# Patient Record
Sex: FEMALE | Race: BLACK OR AFRICAN AMERICAN | Employment: FULL TIME | URBAN - METROPOLITAN AREA
[De-identification: names, ages, dates, MRNs, and addresses within clinical notes are randomized per-mention and may not be internally consistent; named-entity substitution may affect disease eponyms.]

---

## 2017-01-18 ENCOUNTER — HOSPITAL ENCOUNTER (OUTPATIENT)
Dept: LAB | Age: 27
Discharge: HOME | End: 2017-01-18
Attending: OBSTETRICS & GYNECOLOGY
Payer: COMMERCIAL

## 2017-01-18 LAB — HCG SERPL-ACNC: 0 MIU/ML (ref 0–6)

## 2017-01-18 PROCEDURE — 84702 CHORIONIC GONADOTROPIN TEST: CPT | Performed by: OBSTETRICS & GYNECOLOGY

## 2017-03-14 ENCOUNTER — HOSPITAL ENCOUNTER (EMERGENCY)
Age: 27
Discharge: HOME | End: 2017-03-14
Attending: EMERGENCY MEDICINE
Payer: COMMERCIAL

## 2017-03-14 VITALS
DIASTOLIC BLOOD PRESSURE: 70 MMHG | WEIGHT: 216 LBS | RESPIRATION RATE: 16 BRPM | BODY MASS INDEX: 36.88 KG/M2 | HEART RATE: 88 BPM | HEIGHT: 64 IN | SYSTOLIC BLOOD PRESSURE: 125 MMHG | OXYGEN SATURATION: 98 % | TEMPERATURE: 98.2 F

## 2017-03-14 DIAGNOSIS — J02.9 SORE THROAT: Primary | ICD-10-CM

## 2017-03-14 LAB — DEPRECATED S PYO AG THROAT QL EIA: NEGATIVE

## 2017-03-14 PROCEDURE — 87880 STREP A ASSAY W/OPTIC: CPT | Performed by: EMERGENCY MEDICINE

## 2017-03-14 PROCEDURE — 99282 EMERGENCY DEPT VISIT SF MDM: CPT | Performed by: PHYSICIAN ASSISTANT

## 2017-03-14 PROCEDURE — 87081 CULTURE SCREEN ONLY: CPT | Performed by: EMERGENCY MEDICINE

## 2017-03-14 NOTE — LETTER
[facility] EMERGENCY DEPT 
[address] [place] [id number] 
[phone] Work/School Note Date: 3/14/2017 To Whom It May concern: 
 
Terrell Casarez was seen and treated today in the emergency room by the following provider(s): 
Attending Provider: Patti Adkins MD 
Physician Assistant: HOLLIS Castro. Terrell Casarez may return to work on 3-15-17. Sincerely, HOLLIS Castro

## 2017-03-14 NOTE — DISCHARGE INSTRUCTIONS
Sore Throat: Care Instructions  Your Care Instructions    Infection by bacteria or a virus causes most sore throats. Cigarette smoke, dry air, air pollution, allergies, and yelling can also cause a sore throat. Sore throats can be painful and annoying. Fortunately, most sore throats go away on their own. If you have a bacterial infection, your doctor may prescribe antibiotics. Follow-up care is a key part of your treatment and safety. Be sure to make and go to all appointments, and call your doctor if you are having problems. It's also a good idea to know your test results and keep a list of the medicines you take. How can you care for yourself at home? · If your doctor prescribed antibiotics, take them as directed. Do not stop taking them just because you feel better. You need to take the full course of antibiotics. · Gargle with warm salt water once an hour to help reduce swelling and relieve discomfort. Use 1 teaspoon of salt mixed in 1 cup of warm water. · Take an over-the-counter pain medicine, such as acetaminophen (Tylenol), ibuprofen (Advil, Motrin), or naproxen (Aleve). Read and follow all instructions on the label. · Be careful when taking over-the-counter cold or flu medicines and Tylenol at the same time. Many of these medicines have acetaminophen, which is Tylenol. Read the labels to make sure that you are not taking more than the recommended dose. Too much acetaminophen (Tylenol) can be harmful. · Drink plenty of fluids. Fluids may help soothe an irritated throat. Hot fluids, such as tea or soup, may help decrease throat pain. · Use over-the-counter throat lozenges to soothe pain. Regular cough drops or hard candy may also help. These should not be given to young children because of the risk of choking. · Do not smoke or allow others to smoke around you. If you need help quitting, talk to your doctor about stop-smoking programs and medicines.  These can increase your chances of quitting for good. · Use a vaporizer or humidifier to add moisture to your bedroom. Follow the directions for cleaning the machine. When should you call for help? Call your doctor now or seek immediate medical care if:  · You have new or worse trouble swallowing. · Your sore throat gets much worse on one side. Watch closely for changes in your health, and be sure to contact your doctor if you do not get better as expected. Where can you learn more? Go to http://wilfredo-gretchen.info/. Enter [phone] [id number] [phone] in the search box to learn more about \"Sore Throat: Care Instructions. \"  Current as of: July 29, 2016  Content Version: 11.1  © 2368-9886 [employer]. Care instructions adapted under license by [employer] (which disclaims liability or warranty for this information). If you have questions about a medical condition or this instruction, always ask your healthcare professional. [address] any warranty or liability for your use of this information.

## 2017-03-14 NOTE — ED PROVIDER NOTES
Patient is a 32 y.o. female presenting with sore throat and cough. The history is provided by the patient. Sore Throat    This is a new problem. The current episode started yesterday. The problem has not changed since onset. There has been no fever. Associated symptoms include cough. She has had no exposure to strep or mono. She has tried nothing for the symptoms. The treatment provided no relief. Cough   Associated symptoms include sore throat. Past Medical History:   Diagnosis Date    Abnormal Papanicolaou smear of cervix     12/2016 (postpartum) ASCUS, neg HPV     Headache     Hypertension     with last pregnancy    Preeclampsia        History reviewed. No pertinent surgical history. Family History:   Problem Relation Age of Onset    Hypertension Mother     Migraines Mother     Lung Disease Maternal Grandmother     Alcohol abuse Neg Hx     Arthritis-rheumatoid Neg Hx     Bleeding Prob Neg Hx     Cancer Neg Hx     Headache Neg Hx     Breast Cancer Neg Hx     Colon Cancer Neg Hx     Ovarian Cancer Neg Hx        Social History     Social History    Marital status: SINGLE     Spouse name: N/A    Number of children: N/A    Years of education: N/A     Occupational History    Not on file. Social History Main Topics    Smoking status: Never Smoker    Smokeless tobacco: Not on file    Alcohol use No    Drug use: No    Sexual activity: Not Currently     Partners: Male     Birth control/ protection: IUD     Other Topics Concern    Not on file     Social History Narrative         ALLERGIES: Review of patient's allergies indicates no known allergies. Review of Systems   HENT: Positive for sore throat. Respiratory: Positive for cough. All other systems reviewed and are negative.       Vitals:    03/14/17 0849   BP: 125/70   Pulse: 88   Resp: 16   Temp: 98.2 °F (36.8 °C)   SpO2: 98%   Weight: 98 kg (216 lb)   Height: 5' 4\" (1.626 m)            Physical Exam   Constitutional: She is oriented to person, place, and time. She appears well-developed and well-nourished. No distress. HENT:   Head: Normocephalic and atraumatic. Right Ear: External ear normal.   Left Ear: External ear normal.   Mouth/Throat: Oropharynx is clear and moist.   Eyes: Conjunctivae and EOM are normal. Pupils are equal, round, and reactive to light. Neck: Normal range of motion. Neck supple. Cardiovascular: Normal rate and regular rhythm. Pulmonary/Chest: Effort normal and breath sounds normal. No respiratory distress. She has no wheezes. Abdominal: Soft. Bowel sounds are normal. There is no tenderness. There is no rebound and no guarding. Musculoskeletal: She exhibits no edema. Neurological: She is alert and oriented to person, place, and time. Skin: Skin is warm. Nursing note and vitals reviewed.        MDM  Number of Diagnoses or Management Options  Diagnosis management comments: rss -,  Advised to use over the counter cough and cold meds for symptomatic  relief, work note given        Amount and/or Complexity of Data Reviewed  Clinical lab tests: ordered and reviewed  Review and summarize past medical records: yes    Risk of Complications, Morbidity, and/or Mortality  Presenting problems: low  Diagnostic procedures: low  Management options: low    Patient Progress  Patient progress: improved    ED Course       Procedures

## 2017-03-16 LAB
BACTERIA SPEC CULT: NORMAL
SERVICE CMNT-IMP: NORMAL

## 2018-01-09 ENCOUNTER — HOSPITAL ENCOUNTER (EMERGENCY)
Age: 28
Discharge: HOME | End: 2018-01-09
Payer: COMMERCIAL

## 2018-01-09 VITALS
OXYGEN SATURATION: 99 % | TEMPERATURE: 98 F | DIASTOLIC BLOOD PRESSURE: 88 MMHG | BODY MASS INDEX: 38.8 KG/M2 | RESPIRATION RATE: 16 BRPM | SYSTOLIC BLOOD PRESSURE: 129 MMHG | WEIGHT: 219 LBS | HEART RATE: 80 BPM | HEIGHT: 63 IN

## 2018-01-09 DIAGNOSIS — R11.2 NON-INTRACTABLE VOMITING WITH NAUSEA, UNSPECIFIED VOMITING TYPE: Primary | ICD-10-CM

## 2018-01-09 LAB
ALBUMIN SERPL-MCNC: 3.7 G/DL (ref 3.5–5)
ALBUMIN/GLOB SERPL: 0.9 {RATIO} (ref 1.2–3.5)
ALP SERPL-CCNC: 74 U/L (ref 50–136)
ALT SERPL-CCNC: 36 U/L (ref 12–65)
ANION GAP SERPL CALC-SCNC: 10 MMOL/L (ref 7–16)
AST SERPL-CCNC: 23 U/L (ref 15–37)
BACTERIA URNS QL MICRO: 0 /HPF
BASOPHILS # BLD: 0 K/UL (ref 0–0.2)
BASOPHILS NFR BLD: 0 % (ref 0–2)
BILIRUB SERPL-MCNC: 0.4 MG/DL (ref 0.2–1.1)
BUN SERPL-MCNC: 8 MG/DL (ref 6–23)
CALCIUM SERPL-MCNC: 9 MG/DL (ref 8.3–10.4)
CASTS URNS QL MICRO: NORMAL /LPF
CHLORIDE SERPL-SCNC: 103 MMOL/L (ref 98–107)
CO2 SERPL-SCNC: 26 MMOL/L (ref 21–32)
CREAT SERPL-MCNC: 0.61 MG/DL (ref 0.6–1)
DIFFERENTIAL METHOD BLD: ABNORMAL
EOSINOPHIL # BLD: 0.2 K/UL (ref 0–0.8)
EOSINOPHIL NFR BLD: 2 % (ref 0.5–7.8)
EPI CELLS #/AREA URNS HPF: NORMAL /HPF
ERYTHROCYTE [DISTWIDTH] IN BLOOD BY AUTOMATED COUNT: 13.1 % (ref 11.9–14.6)
GLOBULIN SER CALC-MCNC: 4 G/DL (ref 2.3–3.5)
GLUCOSE SERPL-MCNC: 91 MG/DL (ref 65–100)
HCT VFR BLD AUTO: 40 % (ref 35.8–46.3)
HGB BLD-MCNC: 13.5 G/DL (ref 11.7–15.4)
IMM GRANULOCYTES # BLD: 0 K/UL (ref 0–0.5)
IMM GRANULOCYTES NFR BLD AUTO: 0 % (ref 0–5)
LIPASE SERPL-CCNC: 95 U/L (ref 73–393)
LYMPHOCYTES # BLD: 1.6 K/UL (ref 0.5–4.6)
LYMPHOCYTES NFR BLD: 25 % (ref 13–44)
MCH RBC QN AUTO: 30.6 PG (ref 26.1–32.9)
MCHC RBC AUTO-ENTMCNC: 33.8 G/DL (ref 31.4–35)
MCV RBC AUTO: 90.7 FL (ref 79.6–97.8)
MONOCYTES # BLD: 0.5 K/UL (ref 0.1–1.3)
MONOCYTES NFR BLD: 9 % (ref 4–12)
NEUTS SEG # BLD: 4 K/UL (ref 1.7–8.2)
NEUTS SEG NFR BLD: 64 % (ref 43–78)
PLATELET # BLD AUTO: 330 K/UL (ref 150–450)
PMV BLD AUTO: 10.5 FL (ref 10.8–14.1)
POTASSIUM SERPL-SCNC: 4 MMOL/L (ref 3.5–5.1)
PROT SERPL-MCNC: 7.7 G/DL (ref 6.3–8.2)
RBC # BLD AUTO: 4.41 M/UL (ref 4.05–5.25)
RBC #/AREA URNS HPF: NORMAL /HPF
SODIUM SERPL-SCNC: 139 MMOL/L (ref 136–145)
WBC # BLD AUTO: 6.3 K/UL (ref 4.3–11.1)
WBC URNS QL MICRO: NORMAL /HPF

## 2018-01-09 PROCEDURE — 85025 COMPLETE CBC W/AUTO DIFF WBC: CPT

## 2018-01-09 PROCEDURE — 74011250636 HC RX REV CODE- 250/636

## 2018-01-09 PROCEDURE — 96361 HYDRATE IV INFUSION ADD-ON: CPT

## 2018-01-09 PROCEDURE — 83690 ASSAY OF LIPASE: CPT

## 2018-01-09 PROCEDURE — 80053 COMPREHEN METABOLIC PANEL: CPT

## 2018-01-09 PROCEDURE — 81003 URINALYSIS AUTO W/O SCOPE: CPT

## 2018-01-09 PROCEDURE — 81015 MICROSCOPIC EXAM OF URINE: CPT | Performed by: EMERGENCY MEDICINE

## 2018-01-09 PROCEDURE — 96374 THER/PROPH/DIAG INJ IV PUSH: CPT

## 2018-01-09 PROCEDURE — 99284 EMERGENCY DEPT VISIT MOD MDM: CPT

## 2018-01-09 PROCEDURE — 74011250637 HC RX REV CODE- 250/637

## 2018-01-09 RX ORDER — HYOSCYAMINE SULFATE 0.12 MG/1
0.12 TABLET SUBLINGUAL
Status: COMPLETED | OUTPATIENT
Start: 2018-01-09 | End: 2018-01-09

## 2018-01-09 RX ORDER — ONDANSETRON 2 MG/ML
4 INJECTION INTRAMUSCULAR; INTRAVENOUS
Status: COMPLETED | OUTPATIENT
Start: 2018-01-09 | End: 2018-01-09

## 2018-01-09 RX ORDER — ONDANSETRON HYDROCHLORIDE 8 MG/1
8 TABLET, FILM COATED ORAL
Qty: 10 TAB | Refills: 0 | Status: SHIPPED | OUTPATIENT
Start: 2018-01-09 | End: 2019-01-22

## 2018-01-09 RX ADMIN — ONDANSETRON 4 MG: 2 INJECTION INTRAMUSCULAR; INTRAVENOUS at 07:26

## 2018-01-09 RX ADMIN — SODIUM CHLORIDE 1000 ML: 900 INJECTION, SOLUTION INTRAVENOUS at 07:25

## 2018-01-09 RX ADMIN — HYOSCYAMINE SULFATE 0.12 MG: 0.12 TABLET ORAL; SUBLINGUAL at 07:26

## 2018-01-09 NOTE — ED NOTES
I have reviewed discharge instructions with the patient. The patient verbalized understanding. Patient left ED via Discharge Method: ambulatory to Home with self. Opportunity for questions and clarification provided. Patient given 1 scripts. Patient given work excuse. To continue your aftercare when you leave the hospital, you may receive an automated call from our care team to check in on how you are doing. This is a free service and part of our promise to provide the best care and service to meet your aftercare needs.  If you have questions, or wish to unsubscribe from this service please call [phone]. Thank you for Choosing our [address] [place] [address] Emergency Department.

## 2018-01-09 NOTE — LETTER
[facility] EMERGENCY DEPT 
[address] [place] [id number] 
[phone] Work/School Note Date: 1/9/2018 To Whom It May concern: 
 
Michael Simmons was seen and treated today in the emergency room by the following provider(s): 
Attending Provider: Marilee Cranker, MD. Michael Simmons may return to work on 1/10/18.  
 
Sincerely, 
 
 
 
 
Judith Galaviz RN

## 2018-01-09 NOTE — ED PROVIDER NOTES
Patient is a 32 y.o. female presenting with vomiting. The history is provided by the patient. Vomiting    This is a new problem. The current episode started 6 to 12 hours ago. The problem occurs 2 to 4 times per day. The problem has not changed since onset. The emesis has an appearance of stomach contents. There has been no fever. Pertinent negatives include no chills, no fever, no sweats, no abdominal pain, no diarrhea, no arthralgias and no myalgias. The patient is not pregnant. Risk factors include ill contacts. Her pertinent negatives include no irritable bowel syndrome, no bowel resection, no gastric bypass and no DM. Past Medical History:   Diagnosis Date    Abnormal Papanicolaou smear of cervix     12/2016 (postpartum) ASCUS, neg HPV     Headache     Hypertension     with last pregnancy    Preeclampsia        History reviewed. No pertinent surgical history. Family History:   Problem Relation Age of Onset    Hypertension Mother     Migraines Mother     Lung Disease Maternal Grandmother     Alcohol abuse Neg Hx     Arthritis-rheumatoid Neg Hx     Bleeding Prob Neg Hx     Cancer Neg Hx     Headache Neg Hx     Breast Cancer Neg Hx     Colon Cancer Neg Hx     Ovarian Cancer Neg Hx        Social History     Social History    Marital status: SINGLE     Spouse name: N/A    Number of children: N/A    Years of education: N/A     Occupational History    Not on file. Social History Main Topics    Smoking status: Never Smoker    Smokeless tobacco: Not on file    Alcohol use No    Drug use: No    Sexual activity: Not Currently     Partners: Male     Birth control/ protection: IUD     Other Topics Concern    Not on file     Social History Narrative         ALLERGIES: Review of patient's allergies indicates no known allergies. Review of Systems   Constitutional: Negative. Negative for activity change, chills and fever. HENT: Negative. Eyes: Negative. Respiratory: Negative. Cardiovascular: Negative. Gastrointestinal: Positive for vomiting. Negative for abdominal pain and diarrhea. Genitourinary: Negative. Musculoskeletal: Negative. Negative for arthralgias and myalgias. Skin: Negative. Neurological: Negative. Psychiatric/Behavioral: Negative. All other systems reviewed and are negative. Vitals:    01/09/18 0615   BP: 129/88   Pulse: 80   Resp: 16   Temp: 98 °F (36.7 °C)   SpO2: 99%   Weight: 99.3 kg (219 lb)   Height: 5' 3\" (1.6 m)            Physical Exam   Constitutional: She is oriented to person, place, and time. She appears well-developed and well-nourished. No distress. HENT:   Head: Normocephalic and atraumatic. Right Ear: External ear normal.   Left Ear: External ear normal.   Nose: Nose normal.   Mouth/Throat: Oropharynx is clear and moist. No oropharyngeal exudate. Eyes: Conjunctivae and EOM are normal. Pupils are equal, round, and reactive to light. Right eye exhibits no discharge. Left eye exhibits no discharge. No scleral icterus. Neck: Normal range of motion. Neck supple. No JVD present. No tracheal deviation present. Cardiovascular: Normal rate, regular rhythm and intact distal pulses. Pulmonary/Chest: Effort normal and breath sounds normal. No stridor. No respiratory distress. She has no wheezes. She exhibits no tenderness. Abdominal: Soft. Bowel sounds are normal. She exhibits no distension and no mass. There is no tenderness. Musculoskeletal: Normal range of motion. She exhibits no edema or tenderness. Neurological: She is alert and oriented to person, place, and time. No cranial nerve deficit. Skin: Skin is warm and dry. No rash noted. She is not diaphoretic. No erythema. No pallor. Psychiatric: She has a normal mood and affect. Her behavior is normal. Thought content normal.   Nursing note and vitals reviewed.        MDM  Number of Diagnoses or Management Options  Diagnosis management comments: No abdominal pain and no tenderness. Symptoms started last night. She is not developed diarrhea. She has been around several children who've been sick.        Amount and/or Complexity of Data Reviewed  Clinical lab tests: ordered and reviewed  Tests in the radiology section of CPT®: ordered and reviewed  Tests in the medicine section of CPT®: ordered and reviewed      ED Course       Procedures

## 2018-01-09 NOTE — DISCHARGE INSTRUCTIONS
Nausea and Vomiting: Care Instructions  Your Care Instructions    When you are nauseated, you may feel weak and sweaty and notice a lot of saliva in your mouth. Nausea often leads to vomiting. Most of the time you do not need to worry about nausea and vomiting, but they can be signs of other illnesses. Two common causes of nausea and vomiting are stomach flu and food poisoning. Nausea and vomiting from viral stomach flu will usually start to improve within 24 hours. Nausea and vomiting from food poisoning may last from 12 to 48 hours. The doctor has checked you carefully, but problems can develop later. If you notice any problems or new symptoms, get medical treatment right away. Follow-up care is a key part of your treatment and safety. Be sure to make and go to all appointments, and call your doctor if you are having problems. It's also a good idea to know your test results and keep a list of the medicines you take. How can you care for yourself at home? · To prevent dehydration, drink plenty of fluids, enough so that your urine is light yellow or clear like water. Choose water and other caffeine-free clear liquids until you feel better. If you have kidney, heart, or liver disease and have to limit fluids, talk with your doctor before you increase the amount of fluids you drink. · Rest in bed until you feel better. · When you are able to eat, try clear soups, mild foods, and liquids until all symptoms are gone for 12 to 48 hours. Other good choices include dry toast, crackers, cooked cereal, and gelatin dessert, such as Jell-O. When should you call for help? Call 911 anytime you think you may need emergency care. For example, call if:  ? · You passed out (lost consciousness). ?Call your doctor now or seek immediate medical care if:  ? · You have symptoms of dehydration, such as:  ¨ Dry eyes and a dry mouth. ¨ Passing only a little dark urine.   ¨ Feeling thirstier than usual.   ? · You have new or worsening belly pain. ? · You have a new or higher fever. ? · You vomit blood or what looks like coffee grounds. ? Watch closely for changes in your health, and be sure to contact your doctor if:  ? · You have ongoing nausea and vomiting. ? · Your vomiting is getting worse. ? · Your vomiting lasts longer than 2 days. ? · You are not getting better as expected. Where can you learn more? Go to http://wilfredo-gretchen.info/. Enter 25 [id number] in the search box to learn more about \"Nausea and Vomiting: Care Instructions. \"  Current as of: March 20, 2017  Content Version: 11.4  © 6206-4913 [employer]. Care instructions adapted under license by [employer] (which disclaims liability or warranty for this information). If you have questions about a medical condition or this instruction, always ask your healthcare professional. [address] any warranty or liability for your use of this information.

## 2019-01-22 ENCOUNTER — HOSPITAL ENCOUNTER (EMERGENCY)
Age: 29
Discharge: HOME | End: 2019-01-23
Attending: EMERGENCY MEDICINE
Payer: SELF-PAY

## 2019-01-22 DIAGNOSIS — R42 DIZZINESS: ICD-10-CM

## 2019-01-22 DIAGNOSIS — R55 NEAR SYNCOPE: Primary | ICD-10-CM

## 2019-01-22 LAB
ALBUMIN SERPL-MCNC: 4 G/DL (ref 3.5–5)
ALBUMIN/GLOB SERPL: 0.9 {RATIO}
ALP SERPL-CCNC: 78 U/L (ref 50–136)
ALT SERPL-CCNC: 35 U/L (ref 12–65)
ANION GAP SERPL CALC-SCNC: 10 MMOL/L
AST SERPL-CCNC: 23 U/L (ref 15–37)
ATRIAL RATE: 76 BPM
BASOPHILS # BLD: 0 K/UL (ref 0–0.2)
BASOPHILS NFR BLD: 0 % (ref 0–2)
BILIRUB SERPL-MCNC: 0.8 MG/DL (ref 0.2–1.1)
BUN SERPL-MCNC: 5 MG/DL (ref 6–23)
CALCIUM SERPL-MCNC: 9.2 MG/DL (ref 8.3–10.4)
CALCULATED P AXIS, ECG09: 70 DEGREES
CALCULATED R AXIS, ECG10: 52 DEGREES
CALCULATED T AXIS, ECG11: 40 DEGREES
CHLORIDE SERPL-SCNC: 102 MMOL/L (ref 98–107)
CO2 SERPL-SCNC: 25 MMOL/L (ref 21–32)
CREAT SERPL-MCNC: 0.88 MG/DL (ref 0.6–1)
DIAGNOSIS, 93000: NORMAL
DIFFERENTIAL METHOD BLD: NORMAL
EOSINOPHIL # BLD: 0.1 K/UL (ref 0–0.8)
EOSINOPHIL NFR BLD: 1 % (ref 0.5–7.8)
ERYTHROCYTE [DISTWIDTH] IN BLOOD BY AUTOMATED COUNT: 13.2 % (ref 11.9–14.6)
GLOBULIN SER CALC-MCNC: 4.4 G/DL
GLUCOSE SERPL-MCNC: 89 MG/DL (ref 65–100)
HCT VFR BLD AUTO: 40.8 % (ref 35.8–46.3)
HGB BLD-MCNC: 13.6 G/DL (ref 11.7–15.4)
IMM GRANULOCYTES # BLD AUTO: 0 K/UL (ref 0–0.5)
IMM GRANULOCYTES NFR BLD AUTO: 0 % (ref 0–5)
LYMPHOCYTES # BLD: 2.9 K/UL (ref 0.5–4.6)
LYMPHOCYTES NFR BLD: 31 % (ref 13–44)
MCH RBC QN AUTO: 31 PG (ref 26.1–32.9)
MCHC RBC AUTO-ENTMCNC: 33.3 G/DL (ref 31.4–35)
MCV RBC AUTO: 92.9 FL (ref 79.6–97.8)
MONOCYTES # BLD: 0.5 K/UL (ref 0.1–1.3)
MONOCYTES NFR BLD: 6 % (ref 4–12)
NEUTS SEG # BLD: 5.8 K/UL (ref 1.7–8.2)
NEUTS SEG NFR BLD: 62 % (ref 43–78)
NRBC # BLD: 0 K/UL (ref 0–0.2)
P-R INTERVAL, ECG05: 166 MS
PLATELET # BLD AUTO: 376 K/UL (ref 150–450)
PMV BLD AUTO: 9.9 FL (ref 9.4–12.3)
POTASSIUM SERPL-SCNC: 3.2 MMOL/L (ref 3.5–5.1)
PROT SERPL-MCNC: 8.4 G/DL
Q-T INTERVAL, ECG07: 390 MS
QRS DURATION, ECG06: 82 MS
QTC CALCULATION (BEZET), ECG08: 438 MS
RBC # BLD AUTO: 4.39 M/UL (ref 4.05–5.2)
SODIUM SERPL-SCNC: 137 MMOL/L (ref 136–145)
VENTRICULAR RATE, ECG03: 76 BPM
WBC # BLD AUTO: 9.4 K/UL (ref 4.3–11.1)

## 2019-01-22 PROCEDURE — 93005 ELECTROCARDIOGRAM TRACING: CPT | Performed by: STUDENT IN AN ORGANIZED HEALTH CARE EDUCATION/TRAINING PROGRAM

## 2019-01-22 PROCEDURE — 80053 COMPREHEN METABOLIC PANEL: CPT

## 2019-01-22 PROCEDURE — 85025 COMPLETE CBC W/AUTO DIFF WBC: CPT

## 2019-01-22 PROCEDURE — 99284 EMERGENCY DEPT VISIT MOD MDM: CPT | Performed by: STUDENT IN AN ORGANIZED HEALTH CARE EDUCATION/TRAINING PROGRAM

## 2019-01-23 VITALS
WEIGHT: 232 LBS | RESPIRATION RATE: 16 BRPM | SYSTOLIC BLOOD PRESSURE: 136 MMHG | BODY MASS INDEX: 39.61 KG/M2 | HEIGHT: 64 IN | OXYGEN SATURATION: 99 % | DIASTOLIC BLOOD PRESSURE: 77 MMHG | TEMPERATURE: 98.2 F | HEART RATE: 81 BPM

## 2019-01-23 NOTE — DISCHARGE INSTRUCTIONS
Patient Education        Dizziness: Care Instructions  Your Care Instructions  Dizziness is the feeling of unsteadiness or fuzziness in your head. It is different than having vertigo, which is a feeling that the room is spinning or that you are moving or falling. It is also different from lightheadedness, which is the feeling that you are about to faint. It can be hard to know what causes dizziness. Some people feel dizzy when they have migraine headaches. Sometimes bouts of flu can make you feel dizzy. Some medical conditions, such as heart problems or high blood pressure, can make you feel dizzy. Many medicines can cause dizziness, including medicines for high blood pressure, pain, or anxiety. If a medicine causes your symptoms, your doctor may recommend that you stop or change the medicine. If it is a problem with your heart, you may need medicine to help your heart work better. If there is no clear reason for your symptoms, your doctor may suggest watching and waiting for a while to see if the dizziness goes away on its own. Follow-up care is a key part of your treatment and safety. Be sure to make and go to all appointments, and call your doctor if you are having problems. It's also a good idea to know your test results and keep a list of the medicines you take. How can you care for yourself at home? · If your doctor recommends or prescribes medicine, take it exactly as directed. Call your doctor if you think you are having a problem with your medicine. · Do not drive while you feel dizzy. · Try to prevent falls. Steps you can take include:  ? Using nonskid mats, adding grab bars near the tub, and using night-lights. ? Clearing your home so that walkways are free of anything you might trip on.  ? Letting family and friends know that you have been feeling dizzy. This will help them know how to help you. When should you call for help? Call 911 anytime you think you may need emergency care.  For example, call if:    · You passed out (lost consciousness).     · You have dizziness along with symptoms of a heart attack. These may include:  ? Chest pain or pressure, or a strange feeling in the chest.  ? Sweating. ? Shortness of breath. ? Nausea or vomiting. ? Pain, pressure, or a strange feeling in the back, neck, jaw, or upper belly or in one or both shoulders or arms. ? Lightheadedness or sudden weakness. ? A fast or irregular heartbeat.     · You have symptoms of a stroke. These may include:  ? Sudden numbness, tingling, weakness, or loss of movement in your face, arm, or leg, especially on only one side of your body. ? Sudden vision changes. ? Sudden trouble speaking. ? Sudden confusion or trouble understanding simple statements. ? Sudden problems with walking or balance. ? A sudden, severe headache that is different from past headaches.    Call your doctor now or seek immediate medical care if:    · You feel dizzy and have a fever, headache, or ringing in your ears.     · You have new or increased nausea and vomiting.     · Your dizziness does not go away or comes back.    Watch closely for changes in your health, and be sure to contact your doctor if:    · You do not get better as expected. Where can you learn more? Go to http://wilfredo-gretchen.info/. Enter H985 in the search box to learn more about \"Dizziness: Care Instructions. \"  Current as of: September 23, 2018  Content Version: 11.9  © 6166-8765 [employer]. Care instructions adapted under license by [employer] (which disclaims liability or warranty for this information). If you have questions about a medical condition or this instruction, always ask your healthcare professional. [place] [address] any warranty or liability for your use of this information.          Patient Education        Lightheadedness or Faintness: Care Instructions  Your Care Instructions  Lightheadedness is a feeling that you are about to faint or \"pass out. \" You do not feel as if you or your surroundings are moving. It is different from vertigo, which is the feeling that you or things around you are spinning or tilting. Lightheadedness usually goes away or gets better when you lie down. If lightheadedness gets worse, it can lead to a fainting spell. It is common to feel lightheaded from time to time. Lightheadedness usually is not caused by a serious problem. It often is caused by a short-lasting drop in blood pressure and blood flow to your head that occurs when you get up too quickly from a seated or lying position. Follow-up care is a key part of your treatment and safety. Be sure to make and go to all appointments, and call your doctor if you are having problems. It's also a good idea to know your test results and keep a list of the medicines you take. How can you care for yourself at home? · Lie down for 1 or 2 minutes when you feel lightheaded. After lying down, sit up slowly and remain sitting for 1 to 2 minutes before slowly standing up. · Avoid movements, positions, or activities that have made you lightheaded in the past.  · Get plenty of rest, especially if you have a cold or flu, which can cause lightheadedness. · Make sure you drink plenty of fluids, especially if you have a fever or have been sweating. · Do not drive or put yourself and others in danger while you feel lightheaded. When should you call for help? Call 911 anytime you think you may need emergency care. For example, call if:    · You have symptoms of a stroke. These may include:  ? Sudden numbness, tingling, weakness, or loss of movement in your face, arm, or leg, especially on only one side of your body. ? Sudden vision changes. ? Sudden trouble speaking. ? Sudden confusion or trouble understanding simple statements. ? Sudden problems with walking or balance.   ? A sudden, severe headache that is different from past headaches.     · You have symptoms of a heart attack. These may include:  ? Chest pain or pressure, or a strange feeling in the chest.  ? Sweating. ? Shortness of breath. ? Nausea or vomiting. ? Pain, pressure, or a strange feeling in the back, neck, jaw, or upper belly or in one or both shoulders or arms. ? Lightheadedness or sudden weakness. ? A fast or irregular heartbeat. After you call 911, the  may tell you to chew 1 adult-strength or 2 to 4 low-dose aspirin. Wait for an ambulance. Do not try to drive yourself.    Watch closely for changes in your health, and be sure to contact your doctor if:    · Your lightheadedness gets worse or does not get better with home care. Where can you learn more? Go to http://wilfredo-gretchen.info/. Enter L265 in the search box to learn more about \"Lightheadedness or Faintness: Care Instructions. \"  Current as of: September 23, 2018  Content Version: 11.9  © 4360-3157 [employer]. Care instructions adapted under license by [employer] (which disclaims liability or warranty for this information). If you have questions about a medical condition or this instruction, always ask your healthcare professional. [address] any warranty or liability for your use of this information.

## 2019-01-23 NOTE — ED NOTES
I have reviewed discharge instructions with the patient. The patient verbalized understanding. Patient left ED via Discharge Method: ambulatory to Home and Home with Home Health with self. Opportunity for questions and clarification provided. Patient given 0 scripts. To continue your aftercare when you leave the hospital, you may receive an automated call from our care team to check in on how you are doing. This is a free service and part of our promise to provide the best care and service to meet your aftercare needs.  If you have questions, or wish to unsubscribe from this service please call [phone]. Thank you for Choosing our [facility] Emergency Department.

## 2019-01-23 NOTE — ED PROVIDER NOTES
Earlier this day. She states that she felt somewhat \"faint and lightheaded. \" now feels fine other soreness to her right neck and anterior chest that is worse to palpation and movement. Recalls no injury. 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 Does not recall any injury or lifting that might have provoked to an injury. The history is provided by the patient. Dizziness This is a new problem. The problem has been resolved. There was no focality noted. Pertinent negatives include no focal weakness, no loss of sensation, no loss of balance, no slurred speech, no speech difficulty, no agitation, no visual change, no auditory change and no unresponsiveness. There has been no fever. Pertinent negatives include no altered mental status, no confusion, no bowel incontinence and no bladder incontinence. Past Medical History:  
Diagnosis Date  Abnormal Papanicolaou smear of cervix 12/2016 (postpartum) ASCUS, neg HPV  Headache  Hypertension   
 with last pregnancy  Preeclampsia History reviewed. No pertinent surgical history. Family History:  
Problem Relation Age of Onset  Hypertension Mother  Migraines Mother  Lung Disease Maternal Grandmother  Alcohol abuse Neg Hx  Arthritis-rheumatoid Neg Hx  Bleeding Prob Neg Hx  Cancer Neg Hx   
 Headache Neg Hx  Breast Cancer Neg Hx  Colon Cancer Neg Hx   
 Ovarian Cancer Neg Hx Social History Socioeconomic History  Marital status: SINGLE Spouse name: Not on file  Number of children: Not on file  Years of education: Not on file  Highest education level: Not on file Social Needs  Financial resource strain: Not on file  Food insecurity - worry: Not on file  Food insecurity - inability: Not on file  Transportation needs - medical: Not on file  Transportation needs - non-medical: Not on file Occupational History  Not on file Tobacco Use  Smoking status: Never Smoker Substance and Sexual Activity  Alcohol use: No  
 Drug use: No  
 Sexual activity: Not Currently Partners: Male Birth control/protection: IUD Other Topics Concern  Not on file Social History Narrative  Not on file ALLERGIES: Patient has no known allergies. Review of Systems Constitutional: Negative for chills and fever. HENT: Negative for ear discharge, ear pain, facial swelling, hearing loss, tinnitus and trouble swallowing. Respiratory: Negative. Cardiovascular: Negative. Gastrointestinal: Negative. Negative for bowel incontinence. Genitourinary: Negative. Negative for bladder incontinence. Musculoskeletal: Negative. Sore to palpation to right trapezius and SCM region Skin: Negative. Negative for color change and pallor. Neurological: Positive for dizziness. Negative for focal weakness, speech difficulty and loss of balance. Psychiatric/Behavioral: Negative for agitation and confusion. All other systems reviewed and are negative. Vitals:  
 01/22/19 2012 01/22/19 2024 01/22/19 2257 01/22/19 2258 BP: (!) 148/91 149/78 125/70 Pulse: 74 Resp: 18 Temp: 98.3 °F (36.8 °C) SpO2: 100% 99%  99% Weight: 105.2 kg (232 lb) Height: 5' 4\" (1.626 m) Physical Exam  
Constitutional: She appears well-developed and well-nourished. No distress. HENT:  
Head: Atraumatic. Right Ear: Tympanic membrane and external ear normal.  
Left Ear: Tympanic membrane and external ear normal.  
Nose: Nose normal.  
Mouth/Throat: Oropharynx is clear and moist.  
Eyes: No scleral icterus. Neck: Neck supple. Cardiovascular: Normal rate. Pulmonary/Chest: Effort normal. No respiratory distress. Musculoskeletal: Normal range of motion. Neurological: She is alert. No cranial nerve deficit or sensory deficit. She exhibits normal muscle tone. Skin: Skin is warm and dry. She is not diaphoretic. Psychiatric: Thought content normal.  
Nursing note and vitals reviewed. MDM Number of Diagnoses or Management Options Dizziness:  
Near syncope:  
Diagnosis management comments: All normal exam and labs with no explaination. Stable for ongoing outpatient work up Amount and/or Complexity of Data Reviewed Clinical lab tests: ordered and reviewed Risk of Complications, Morbidity, and/or Mortality Presenting problems: moderate Diagnostic procedures: low Management options: moderate Patient Progress Patient progress: resolved Procedures Recent Results (from the past 12 hour(s)) EKG, 12 LEAD, INITIAL Collection Time: 01/22/19  8:21 PM  
Result Value Ref Range Ventricular Rate 76 BPM  
 Atrial Rate 76 BPM  
 P-R Interval 166 ms  
 QRS Duration 82 ms Q-T Interval 390 ms QTC Calculation (Bezet) 438 ms Calculated P Axis 70 degrees Calculated R Axis 52 degrees Calculated T Axis 40 degrees Diagnosis Normal sinus rhythm Normal ECG No previous ECGs available Confirmed by Virgilio Dumont MD (), MORALES SALAZAR ([id number]) on 1/22/2019 9:42:08 PM 
  
CBC WITH AUTOMATED DIFF Collection Time: 01/22/19  8:31 PM  
Result Value Ref Range WBC 9.4 4.3 - 11.1 K/uL  
 RBC 4.39 4.05 - 5.2 M/uL  
 HGB 13.6 11.7 - 15.4 g/dL HCT 40.8 35.8 - 46.3 % MCV 92.9 79.6 - 97.8 FL  
 MCH 31.0 26.1 - 32.9 PG  
 MCHC 33.3 31.4 - 35.0 g/dL  
 RDW 13.2 11.9 - 14.6 % PLATELET 883 416 - 127 K/uL MPV 9.9 9.4 - 12.3 FL ABSOLUTE NRBC 0.00 0.0 - 0.2 K/uL  
 DF AUTOMATED NEUTROPHILS 62 43 - 78 % LYMPHOCYTES 31 13 - 44 % MONOCYTES 6 4.0 - 12.0 % EOSINOPHILS 1 0.5 - 7.8 % BASOPHILS 0 0.0 - 2.0 % IMMATURE GRANULOCYTES 0 0.0 - 5.0 %  
 ABS. NEUTROPHILS 5.8 1.7 - 8.2 K/UL  
 ABS. LYMPHOCYTES 2.9 0.5 - 4.6 K/UL  
 ABS. MONOCYTES 0.5 0.1 - 1.3 K/UL  
 ABS. EOSINOPHILS 0.1 0.0 - 0.8 K/UL  
 ABS. BASOPHILS 0.0 0.0 - 0.2 K/UL ABS. IMM. GRANS. 0.0 0.0 - 0.5 K/UL METABOLIC PANEL, COMPREHENSIVE Collection Time: 01/22/19  8:31 PM  
Result Value Ref Range Sodium 137 136 - 145 mmol/L Potassium 3.2 (L) 3.5 - 5.1 mmol/L Chloride 102 98 - 107 mmol/L  
 CO2 25 21 - 32 mmol/L Anion gap 10 mmol/L Glucose 89 65 - 100 mg/dL BUN 5 (L) 6 - 23 MG/DL Creatinine 0.88 0.6 - 1.0 MG/DL  
 GFR est AA >98 >60 ml/min/1.73m2 GFR est non-AA >60 ml/min/1.73m2 Calcium 9.2 8.3 - 10.4 MG/DL Bilirubin, total 0.8 0.2 - 1.1 MG/DL  
 ALT (SGPT) 35 12 - 65 U/L  
 AST (SGOT) 23 15 - 37 U/L Alk. phosphatase 78 50 - 136 U/L Protein, total 8.4 g/dL Albumin 4.0 3.5 - 5.0 g/dL Globulin 4.4 g/dL A-G Ratio 0.9

## 2019-01-23 NOTE — ED TRIAGE NOTES
Pt c/o dizziness since yesterday, checked BP at work and it was elevated. No hx of hypertension except with pregnancy.

## 2019-03-20 ENCOUNTER — HOSPITAL ENCOUNTER (EMERGENCY)
Age: 29
Discharge: HOME | End: 2019-03-20
Attending: EMERGENCY MEDICINE
Payer: SELF-PAY

## 2019-03-20 VITALS
BODY MASS INDEX: 37.9 KG/M2 | DIASTOLIC BLOOD PRESSURE: 63 MMHG | WEIGHT: 222 LBS | TEMPERATURE: 98.8 F | SYSTOLIC BLOOD PRESSURE: 127 MMHG | HEART RATE: 81 BPM | RESPIRATION RATE: 16 BRPM | HEIGHT: 64 IN | OXYGEN SATURATION: 98 %

## 2019-03-20 DIAGNOSIS — H10.32 ACUTE CONJUNCTIVITIS OF LEFT EYE, UNSPECIFIED ACUTE CONJUNCTIVITIS TYPE: Primary | ICD-10-CM

## 2019-03-20 DIAGNOSIS — J06.9 UPPER RESPIRATORY TRACT INFECTION, UNSPECIFIED TYPE: ICD-10-CM

## 2019-03-20 PROCEDURE — 99282 EMERGENCY DEPT VISIT SF MDM: CPT | Performed by: PHYSICIAN ASSISTANT

## 2019-03-20 RX ORDER — PREDNISONE 20 MG/1
20 TABLET ORAL DAILY
Qty: 7 TAB | Refills: 0 | Status: SHIPPED | OUTPATIENT
Start: 2019-03-20 | End: 2019-03-27

## 2019-03-20 RX ORDER — CEPHALEXIN 500 MG/1
500 CAPSULE ORAL 4 TIMES DAILY
Qty: 28 CAP | Refills: 0 | Status: SHIPPED | OUTPATIENT
Start: 2019-03-20 | End: 2019-03-27

## 2019-03-20 RX ORDER — POLYMYXIN B SULFATE AND TRIMETHOPRIM 1; 10000 MG/ML; [USP'U]/ML
1 SOLUTION OPHTHALMIC EVERY 4 HOURS
Qty: 10 ML | Refills: 0 | Status: SHIPPED | OUTPATIENT
Start: 2019-03-20 | End: 2019-03-27

## 2019-03-20 NOTE — ED PROVIDER NOTES
The history is provided by the patient. Red Eye This is a new problem. The current episode started 2 days ago. The problem occurs constantly. The problem has not changed since onset. The left eye is affected. The injury mechanism was none. The pain is at a severity of 7/10. The pain is mild. There is no history of trauma to the eye. There is no known exposure to pink eye. She does not wear contacts. Associated symptoms include eye redness. Pertinent negatives include no blurred vision, no double vision, no itching and no blindness. She has tried nothing for the symptoms. The treatment provided no relief. Cough This is a new problem. The current episode started more than 1 week ago. The problem occurs constantly. The problem has been gradually worsening. The cough is productive of sputum. There has been no fever. Associated symptoms include eye redness. She has tried decongestants for the symptoms. The treatment provided mild relief. She is not a smoker. Her past medical history is significant for bronchitis. Past Medical History:  
Diagnosis Date  Abnormal Papanicolaou smear of cervix 12/2016 (postpartum) ASCUS, neg HPV  Headache  Hypertension   
 with last pregnancy  Preeclampsia History reviewed. No pertinent surgical history. Family History:  
Problem Relation Age of Onset  Hypertension Mother  Migraines Mother  Lung Disease Maternal Grandmother  Alcohol abuse Neg Hx  Arthritis-rheumatoid Neg Hx  Bleeding Prob Neg Hx  Cancer Neg Hx   
 Headache Neg Hx  Breast Cancer Neg Hx  Colon Cancer Neg Hx   
 Ovarian Cancer Neg Hx Social History Socioeconomic History  Marital status: SINGLE Spouse name: Not on file  Number of children: Not on file  Years of education: Not on file  Highest education level: Not on file Occupational History  Not on file Social Needs  Financial resource strain: Not on file  Food insecurity:  
  Worry: Not on file Inability: Not on file  Transportation needs:  
  Medical: Not on file Non-medical: Not on file Tobacco Use  Smoking status: Never Smoker  Smokeless tobacco: Never Used Substance and Sexual Activity  Alcohol use: Yes Comment: occasional  
 Drug use: No  
 Sexual activity: Not Currently Partners: Male Birth control/protection: IUD Lifestyle  Physical activity:  
  Days per week: Not on file Minutes per session: Not on file  Stress: Not on file Relationships  Social connections:  
  Talks on phone: Not on file Gets together: Not on file Attends Adventism service: Not on file Active member of club or organization: Not on file Attends meetings of clubs or organizations: Not on file Relationship status: Not on file  Intimate partner violence:  
  Fear of current or ex partner: Not on file Emotionally abused: Not on file Physically abused: Not on file Forced sexual activity: Not on file Other Topics Concern  Not on file Social History Narrative  Not on file ALLERGIES: Patient has no known allergies. Review of Systems Eyes: Positive for redness. Negative for blindness, blurred vision and double vision. Respiratory: Positive for cough. Skin: Negative for itching. All other systems reviewed and are negative. Vitals:  
 03/20/19 1102 BP: 127/63 Pulse: 81 Resp: 16 Temp: 98.8 °F (37.1 °C) SpO2: 98% Weight: 100.7 kg (222 lb) Height: 5' 4\" (1.626 m) Physical Exam  
Constitutional: She is oriented to person, place, and time. She appears well-developed and well-nourished. No distress. HENT:  
Head: Normocephalic and atraumatic. Right Ear: External ear normal.  
Left Ear: External ear normal.  
Mouth/Throat: Oropharynx is clear and moist.  
Nasal congestion Eyes: Pupils are equal, round, and reactive to light.  EOM are normal.  
 Left eye with redness, mild swelling upper lid, no drainage Neck: Normal range of motion. Neck supple. Cardiovascular: Normal rate and regular rhythm. Pulmonary/Chest: Effort normal and breath sounds normal. No stridor. She has no rales. Abdominal: Soft. Bowel sounds are normal. She exhibits no mass. There is no tenderness. There is no guarding. Musculoskeletal: Normal range of motion. Neurological: She is alert and oriented to person, place, and time. Skin: Skin is warm. She is not diaphoretic. Psychiatric: She has a normal mood and affect. Nursing note and vitals reviewed. MDM Number of Diagnoses or Management Options Diagnosis management comments: Conjunctivitis, uri Will treat polytrim, keflex and prednisone Amount and/or Complexity of Data Reviewed Review and summarize past medical records: yes Risk of Complications, Morbidity, and/or Mortality Presenting problems: low Diagnostic procedures: low Management options: low Patient Progress Patient progress: improved Procedures

## 2019-03-20 NOTE — ED NOTES
I have reviewed discharge instructions with the patient. The patient verbalized understanding. Patient left ED via Discharge Method: ambulatory to Home with  self. Opportunity for questions and clarification provided. Patient given 3 scripts. To continue your aftercare when you leave the hospital, you may receive an automated call from our care team to check in on how you are doing. This is a free service and part of our promise to provide the best care and service to meet your aftercare needs.  If you have questions, or wish to unsubscribe from this service please call [phone]. Thank you for Choosing our [facility] Emergency Department.

## 2019-03-20 NOTE — LETTER
[address] [facility] EMERGENCY DEPT 
[place] [facility] [address] [facility] [address] 
[phone] Work/School Note Date: 3/20/2019 To Whom It May concern: 
 
Regan Funk was seen and treated today in the emergency room by the following provider(s): 
Attending Provider: Samra Duque MD 
Physician Assistant: HOLLIS Prince. Regan Funk may return to work on 3-21-19. Sincerely, HOLLIS Bey

## 2019-03-20 NOTE — ED TRIAGE NOTES
Patient with multiple complaints during triage. Patient complaining of red itching eyes, runny nose and productive cough x 3 weeks.

## 2019-10-11 ENCOUNTER — HOSPITAL ENCOUNTER (EMERGENCY)
Age: 29
Discharge: STILL ADMITTED OR ADMINISTRATIVE | End: 2019-10-11
Attending: INTERNAL MEDICINE
Payer: SELF-PAY

## 2019-10-11 PROCEDURE — 75810000275 HC EMERGENCY DEPT VISIT NO LEVEL OF CARE: Performed by: EMERGENCY MEDICINE

## 2020-02-17 PROBLEM — Z97.5 IUD (INTRAUTERINE DEVICE) IN PLACE: Status: ACTIVE | Noted: 2017-02-07

## 2020-03-18 PROBLEM — E66.01 SEVERE OBESITY (HCC): Status: ACTIVE | Noted: 2020-03-18

## 2021-06-07 ENCOUNTER — HOSPITAL ENCOUNTER (EMERGENCY)
Age: 31
Discharge: HOME | End: 2021-06-07
Attending: EMERGENCY MEDICINE
Payer: COMMERCIAL

## 2021-06-07 VITALS
HEIGHT: 64 IN | OXYGEN SATURATION: 97 % | TEMPERATURE: 98.7 F | RESPIRATION RATE: 18 BRPM | HEART RATE: 95 BPM | WEIGHT: 210 LBS | BODY MASS INDEX: 35.85 KG/M2

## 2021-06-07 DIAGNOSIS — R10.9 CHRONIC ABDOMINAL PAIN: ICD-10-CM

## 2021-06-07 DIAGNOSIS — K52.9 CHRONIC DIARRHEA: ICD-10-CM

## 2021-06-07 DIAGNOSIS — K27.9 PUD (PEPTIC ULCER DISEASE): ICD-10-CM

## 2021-06-07 DIAGNOSIS — K92.1 HEMATOCHEZIA: Primary | ICD-10-CM

## 2021-06-07 DIAGNOSIS — G89.29 CHRONIC ABDOMINAL PAIN: ICD-10-CM

## 2021-06-07 DIAGNOSIS — Z86.19 HISTORY OF CLOSTRIDIOIDES DIFFICILE COLITIS: ICD-10-CM

## 2021-06-07 LAB
ALBUMIN SERPL-MCNC: 3.2 G/DL (ref 3.5–5)
ALBUMIN/GLOB SERPL: 0.8 {RATIO} (ref 1.2–3.5)
ALP SERPL-CCNC: 76 U/L (ref 50–130)
ALT SERPL-CCNC: 57 U/L (ref 12–65)
ANION GAP SERPL CALC-SCNC: 9 MMOL/L (ref 7–16)
AST SERPL-CCNC: 96 U/L (ref 15–37)
BACTERIA URNS QL MICRO: 0 /HPF
BASOPHILS # BLD: 0 K/UL (ref 0–0.2)
BASOPHILS NFR BLD: 1 % (ref 0–2)
BILIRUB SERPL-MCNC: 0.7 MG/DL (ref 0.2–1.1)
BUN SERPL-MCNC: 9 MG/DL (ref 6–23)
CALCIUM SERPL-MCNC: 8.4 MG/DL (ref 8.3–10.4)
CASTS URNS QL MICRO: 0 /LPF
CHLORIDE SERPL-SCNC: 108 MMOL/L (ref 98–107)
CO2 SERPL-SCNC: 21 MMOL/L (ref 21–32)
CREAT SERPL-MCNC: 0.57 MG/DL (ref 0.6–1)
DIFFERENTIAL METHOD BLD: ABNORMAL
EOSINOPHIL # BLD: 0.4 K/UL (ref 0–0.8)
EOSINOPHIL NFR BLD: 6 % (ref 0.5–7.8)
EPI CELLS #/AREA URNS HPF: NORMAL /HPF
ERYTHROCYTE [DISTWIDTH] IN BLOOD BY AUTOMATED COUNT: 14.7 % (ref 11.9–14.6)
GLOBULIN SER CALC-MCNC: 4 G/DL (ref 2.3–3.5)
GLUCOSE SERPL-MCNC: 75 MG/DL (ref 65–100)
HCG UR QL: NEGATIVE
HCT VFR BLD AUTO: 37.1 % (ref 35.8–46.3)
HEMOCCULT STL QL: NEGATIVE
HGB BLD-MCNC: 13 G/DL (ref 11.7–15.4)
IMM GRANULOCYTES # BLD AUTO: 0 K/UL (ref 0–0.5)
IMM GRANULOCYTES NFR BLD AUTO: 0 % (ref 0–5)
LIPASE SERPL-CCNC: 178 U/L (ref 73–393)
LYMPHOCYTES # BLD: 3 K/UL (ref 0.5–4.6)
LYMPHOCYTES NFR BLD: 53 % (ref 13–44)
MCH RBC QN AUTO: 35.4 PG (ref 26.1–32.9)
MCHC RBC AUTO-ENTMCNC: 35 G/DL (ref 31.4–35)
MCV RBC AUTO: 101.1 FL (ref 79.6–97.8)
MONOCYTES # BLD: 0.3 K/UL (ref 0.1–1.3)
MONOCYTES NFR BLD: 5 % (ref 4–12)
NEUTS SEG # BLD: 2 K/UL (ref 1.7–8.2)
NEUTS SEG NFR BLD: 35 % (ref 43–78)
NRBC # BLD: 0 K/UL (ref 0–0.2)
PLATELET # BLD AUTO: 377 K/UL (ref 150–450)
PMV BLD AUTO: 9.6 FL (ref 9.4–12.3)
POTASSIUM SERPL-SCNC: 5.2 MMOL/L (ref 3.5–5.1)
PROT SERPL-MCNC: 7.2 G/DL (ref 6.3–8.2)
RBC # BLD AUTO: 3.67 M/UL (ref 4.05–5.2)
RBC #/AREA URNS HPF: NORMAL /HPF
SODIUM SERPL-SCNC: 138 MMOL/L (ref 136–145)
WBC # BLD AUTO: 5.7 K/UL (ref 4.3–11.1)
WBC URNS QL MICRO: NORMAL /HPF

## 2021-06-07 PROCEDURE — 80053 COMPREHEN METABOLIC PANEL: CPT

## 2021-06-07 PROCEDURE — 83690 ASSAY OF LIPASE: CPT

## 2021-06-07 PROCEDURE — 74011250636 HC RX REV CODE- 250/636: Performed by: PHYSICIAN ASSISTANT

## 2021-06-07 PROCEDURE — 87045 FECES CULTURE AEROBIC BACT: CPT

## 2021-06-07 PROCEDURE — 81015 MICROSCOPIC EXAM OF URINE: CPT

## 2021-06-07 PROCEDURE — 96374 THER/PROPH/DIAG INJ IV PUSH: CPT

## 2021-06-07 PROCEDURE — 85025 COMPLETE CBC W/AUTO DIFF WBC: CPT

## 2021-06-07 PROCEDURE — 99284 EMERGENCY DEPT VISIT MOD MDM: CPT

## 2021-06-07 PROCEDURE — 81003 URINALYSIS AUTO W/O SCOPE: CPT

## 2021-06-07 PROCEDURE — 82272 OCCULT BLD FECES 1-3 TESTS: CPT

## 2021-06-07 PROCEDURE — 81025 URINE PREGNANCY TEST: CPT

## 2021-06-07 RX ORDER — VANCOMYCIN HYDROCHLORIDE 125 MG/1
CAPSULE ORAL
COMMUNITY
Start: 2021-05-17 | End: 2021-07-07

## 2021-06-07 RX ORDER — SODIUM CHLORIDE 0.9 % (FLUSH) 0.9 %
5-10 SYRINGE (ML) INJECTION EVERY 8 HOURS
Status: DISCONTINUED | OUTPATIENT
Start: 2021-06-07 | End: 2021-06-07 | Stop reason: HOSPADM

## 2021-06-07 RX ORDER — SUCRALFATE 1 G/1
1 TABLET ORAL 4 TIMES DAILY
Qty: 40 TABLET | Refills: 0 | Status: SHIPPED | OUTPATIENT
Start: 2021-06-07 | End: 2021-06-17

## 2021-06-07 RX ORDER — METOCLOPRAMIDE 10 MG/1
10 TABLET ORAL
COMMUNITY
Start: 2021-05-28 | End: 2021-06-12

## 2021-06-07 RX ORDER — OMEPRAZOLE 20 MG/1
20 CAPSULE, DELAYED RELEASE ORAL 2 TIMES DAILY
COMMUNITY
Start: 2021-03-02 | End: 2022-02-08

## 2021-06-07 RX ORDER — CHOLESTYRAMINE 4 G/9G
1 POWDER, FOR SUSPENSION ORAL DAILY
COMMUNITY
Start: 2021-06-02 | End: 2021-07-23

## 2021-06-07 RX ORDER — ONDANSETRON 4 MG/1
8 TABLET, ORALLY DISINTEGRATING ORAL
Qty: 12 TABLET | Refills: 0 | Status: SHIPPED | OUTPATIENT
Start: 2021-06-07 | End: 2021-06-11

## 2021-06-07 RX ORDER — HYOSCYAMINE SULFATE 0.12 MG/1
0.12 TABLET SUBLINGUAL
Qty: 28 TABLET | Refills: 0 | Status: SHIPPED | OUTPATIENT
Start: 2021-06-07 | End: 2021-06-14

## 2021-06-07 RX ORDER — SODIUM CHLORIDE 0.9 % (FLUSH) 0.9 %
5-10 SYRINGE (ML) INJECTION AS NEEDED
Status: DISCONTINUED | OUTPATIENT
Start: 2021-06-07 | End: 2021-06-07 | Stop reason: HOSPADM

## 2021-06-07 RX ORDER — ONDANSETRON 2 MG/ML
4 INJECTION INTRAMUSCULAR; INTRAVENOUS
Status: COMPLETED | OUTPATIENT
Start: 2021-06-07 | End: 2021-06-07

## 2021-06-07 RX ORDER — DICYCLOMINE HYDROCHLORIDE 10 MG/1
10 CAPSULE ORAL
COMMUNITY
Start: 2021-04-30 | End: 2021-07-23

## 2021-06-07 RX ORDER — ONDANSETRON 4 MG/1
TABLET, ORALLY DISINTEGRATING ORAL
COMMUNITY
Start: 2021-03-06 | End: 2021-06-07

## 2021-06-07 RX ADMIN — SODIUM CHLORIDE 1000 ML: 900 INJECTION, SOLUTION INTRAVENOUS at 18:47

## 2021-06-07 RX ADMIN — ONDANSETRON 4 MG: 2 INJECTION INTRAMUSCULAR; INTRAVENOUS at 18:47

## 2021-06-07 NOTE — ED PROVIDER NOTES
This patient is a 59-year-old female with history of IBS, PUD and C. difficile colitis who comes into the emergency department with concern for persistent abdominal discomfort in the left lower quadrant. She says she has had abdominal pain basically since she was diagnosed with C. difficile back in April. She is currently on a tapering dose of vancomycin and is also taking dicyclomine, omeprazole and cholestyramine. She has been seen by the gastroenterologist and had an endoscopy and colonoscopy on May 10. She says she basically has hematochezia almost every time she has a bowel movement and she is still having diarrhea, nausea and vomiting on a daily basis. She has tried Reglan but it just makes her feel tired. She has also tried Zofran but it has not helped significantly. She also had a CT scan performed on 5/28/2021 which showed no acute abnormality. She says she would like to establish care with a different GI specialist because she has not been improving with the treatment that has been given to her. She says her last visit with the GI doctor was a week ago and they gave her the cholestyramine to start. She also had a negative H Pylori test back in May. Past Medical History:   Diagnosis Date    Abnormal Papanicolaou smear of cervix     12/2016 (postpartum) ASCUS, neg HPV     Encounter for insertion of mirena IUD 02/07/2017    Gastrointestinal disorder     c-diff     Headache     Hypertension     with last pregnancy    Preeclampsia     Trichomonas vaginitis 02/2020    pap w/ + trich       History reviewed. No pertinent surgical history.       Family History:   Problem Relation Age of Onset    Hypertension Mother     Migraines Mother     Thyroid Disease Mother     Lung Disease Maternal Grandmother     Diabetes Maternal Grandmother     Hypertension Maternal Grandmother     Diabetes Other     Alcohol abuse Neg Hx     Arthritis-rheumatoid Neg Hx     Bleeding Prob Neg Hx     Headache Neg Hx     Breast Cancer Neg Hx     Colon Cancer Neg Hx     Ovarian Cancer Neg Hx     Prostate Cancer Neg Hx        Social History     Socioeconomic History    Marital status: SINGLE     Spouse name: Not on file    Number of children: Not on file    Years of education: Not on file    Highest education level: Not on file   Occupational History    Not on file   Tobacco Use    Smoking status: Never Smoker    Smokeless tobacco: Never Used   Substance and Sexual Activity    Alcohol use: Yes     Comment: occasional    Drug use: No    Sexual activity: Not Currently     Partners: Male     Birth control/protection: I.U.D. Other Topics Concern    Not on file   Social History Narrative    Not on file     Social Determinants of Health     Financial Resource Strain:     Difficulty of Paying Living Expenses:    Food Insecurity:     Worried About Running Out of Food in the Last Year:     [address] in the Last Year:    Transportation Needs:     Lack of Transportation (Medical):  Lack of Transportation (Non-Medical):    Physical Activity:     Days of Exercise per Week:     Minutes of Exercise per Session:    Stress:     Feeling of Stress :    Social Connections:     Frequency of Communication with Friends and Family:     Frequency of Social Gatherings with Friends and Family:     Attends Mormonism Services:     Active Member of Clubs or Organizations:     Attends Club or Organization Meetings:     Marital Status:    Intimate Partner Violence:     Fear of Current or Ex-Partner:     Emotionally Abused:     Physically Abused:     Sexually Abused: ALLERGIES: Patient has no known allergies. Review of Systems   Constitutional: Negative for chills and fever. Respiratory: Negative for cough and shortness of breath. Cardiovascular: Negative for chest pain. Gastrointestinal: Positive for abdominal pain, blood in stool, diarrhea, nausea and vomiting.  Negative for abdominal distention. Genitourinary: Negative for decreased urine volume and dysuria. Skin: Negative for color change. All other systems reviewed and are negative. Vitals:    06/07/21 1622   Pulse: 95   Resp: 18   Temp: 98.7 °F (37.1 °C)   SpO2: 97%   Weight: 95.3 kg (210 lb)   Height: 5' 4\" (1.626 m)            Physical Exam  Vitals and nursing note reviewed. Exam conducted with a chaperone present. Constitutional:       General: She is not in acute distress. Appearance: Normal appearance. She is not ill-appearing, toxic-appearing or diaphoretic. HENT:      Head: Normocephalic and atraumatic. Eyes:      Conjunctiva/sclera: Conjunctivae normal.   Cardiovascular:      Rate and Rhythm: Normal rate and regular rhythm. Pulses: Normal pulses. Pulmonary:      Effort: Pulmonary effort is normal. No respiratory distress. Breath sounds: Normal air entry. No stridor, decreased air movement or transmitted upper airway sounds. No decreased breath sounds. Abdominal:      Tenderness: There is abdominal tenderness in the left lower quadrant. There is no right CVA tenderness, left CVA tenderness or guarding. Genitourinary:     Rectum: Guaiac result positive. No mass, tenderness, anal fissure, external hemorrhoid or internal hemorrhoid. Normal anal tone. Comments: External skin tag, + hemoccult. Skin:     General: Skin is warm and dry. Neurological:      General: No focal deficit present. Mental Status: She is alert and oriented to person, place, and time. Mental status is at baseline.           MDM  Number of Diagnoses or Management Options  Chronic abdominal pain: new and requires workup  Chronic diarrhea: new and requires workup  Hematochezia: new and requires workup  History of Clostridioides difficile colitis: new and requires workup  PUD (peptic ulcer disease): new and requires workup  Diagnosis management comments: 27-year-old female with history of IBS, PUD, hematochezia and C. difficile colitis and chronic abdominal pain, chronic diarrhea and chronic nausea and vomiting comes in today for evaluation of her chronic abdominal pain. I would reviewed the patient's chart in detail. She recently had a colonoscopy as well as an endoscopy. She is currently on a tapered therapy of vancomycin for her continued treatment of C. difficile colitis. Labs were obtained showing no acute abnormality. I do not think she needs an abdominal scan at this time given her fairly benign abdominal exam apart from mild left lower quadrant discomfort which she says is chronic. Patient will be started on Carafate and Levsin and she was instructed to follow-up closely with the GI specialist.  She is instructed regarding return precautions including severe worsening pain, dizziness, fainting or near fainting or intractable vomiting. She says she has tolerated Zofran well but sometimes still has breakthrough nausea. Will prescribe the higher dose of Zofran tablets to use and she also has Reglan at home for breakthrough nausea and vomiting.            Amount and/or Complexity of Data Reviewed  Clinical lab tests: reviewed and ordered  Tests in the medicine section of CPT®: ordered and reviewed  Discussion of test results with the performing providers: yes  Review and summarize past medical records: yes    Risk of Complications, Morbidity, and/or Mortality  Presenting problems: moderate  Diagnostic procedures: low  Management options: low    Patient Progress  Patient progress: stable         Procedures

## 2021-06-08 NOTE — ED NOTES
I have reviewed discharge instructions with the patient. The patient verbalized understanding. Patient left ED via Discharge Method: ambulatory to Home with her other. Opportunity for questions and clarification provided. Patient given 3 scripts. To continue your aftercare when you leave the hospital, you may receive an automated call from our care team to check in on how you are doing. This is a free service and part of our promise to provide the best care and service to meet your aftercare needs.  If you have questions, or wish to unsubscribe from this service please call [phone]. Thank you for Choosing our [facility] Emergency Department.

## 2021-06-08 NOTE — DISCHARGE INSTRUCTIONS
Please continue the Vancocin as prescribed by the GI specialist.  Continue to take the omeprazole and cholestyramine as prescribed. You may take the Zofran tablets, up to 2 tablets every 8 hours as needed for nausea and vomiting. You may also take Levsin every 4 hours as needed for cramping and diarrhea. Take Carafate as prescribed and follow-up closely with the GI doctor. If you develop severe worsening abdominal pain, dizziness, intractable vomiting or inability to tolerate fluids, please return to the ER.

## 2021-06-10 ENCOUNTER — TRANSCRIBE ORDER (OUTPATIENT)
Dept: SCHEDULING | Age: 31
End: 2021-06-10

## 2021-06-10 DIAGNOSIS — K76.9 LIVER LESION: Primary | ICD-10-CM

## 2021-06-10 LAB
BACTERIA SPEC CULT: NORMAL
SERVICE CMNT-IMP: NORMAL

## 2021-06-12 NOTE — PROGRESS NOTES
Discussed stool culture result with patient. She has followed up with new GI doctor and is being followed by them.

## 2021-06-26 ENCOUNTER — HOSPITAL ENCOUNTER (OUTPATIENT)
Dept: MRI IMAGING | Age: 31
Discharge: HOME | End: 2021-06-26
Attending: INTERNAL MEDICINE | Admitting: RADIOLOGY
Payer: COMMERCIAL

## 2021-06-26 DIAGNOSIS — K76.9 LIVER LESION: ICD-10-CM

## 2021-06-26 PROCEDURE — 74181 MRI ABDOMEN W/O CONTRAST: CPT

## 2021-06-29 ENCOUNTER — TRANSCRIBE ORDER (OUTPATIENT)
Dept: SCHEDULING | Age: 31
End: 2021-06-29

## 2021-06-29 DIAGNOSIS — K80.20 GALL STONES: Primary | ICD-10-CM

## 2021-06-29 DIAGNOSIS — R11.2 NAUSEA AND VOMITING: ICD-10-CM

## 2021-06-29 DIAGNOSIS — R10.84 GENERALIZED ABDOMINAL PAIN: ICD-10-CM

## 2021-07-12 ENCOUNTER — HOSPITAL ENCOUNTER (OUTPATIENT)
Dept: NUCLEAR MEDICINE | Age: 31
Discharge: HOME | End: 2021-07-12
Attending: INTERNAL MEDICINE
Payer: COMMERCIAL

## 2021-07-12 DIAGNOSIS — K80.20 GALL STONES: ICD-10-CM

## 2021-07-12 DIAGNOSIS — R10.84 GENERALIZED ABDOMINAL PAIN: ICD-10-CM

## 2021-07-12 DIAGNOSIS — R11.2 NAUSEA AND VOMITING: ICD-10-CM

## 2021-07-12 PROCEDURE — 74011250636 HC RX REV CODE- 250/636: Performed by: INTERNAL MEDICINE

## 2021-07-12 PROCEDURE — 78227 HEPATOBIL SYST IMAGE W/DRUG: CPT

## 2021-07-12 RX ADMIN — SINCALIDE 1.9 MCG: 5 INJECTION, POWDER, LYOPHILIZED, FOR SOLUTION INTRAVENOUS at 12:20

## 2021-07-20 NOTE — H&P (VIEW-ONLY)
Lucy Lyles MD, [facility], [address] [place] [address]  Nam Vargas  Phone [phone]   Fax [phone]      Date of visit: 7/20/2021     Primary/Requesting provider: BRAXTON Comer   Referring: Dr Alfredo Epperson   Patient presents with   Lara Kingslizzeth New Patient     Biliary 1 Marlyn Len Bains is a 27 y.o. female. HPI  Patient is a 27 y.o. female who presents for consideration of cholecystectomy, after GI evaluation. She had previously been seeing [facility] but recently switched. Her symptoms started in 2020 with nausea and vomiting, which actually has been going on for several years. Diarrhea started last year. She was eventually diagnosed with C diff colitis in April, after amoxicillin for a sinus infection, and improved with a 2 week course of vancomycin. Symptoms returned, even more sever, when the vancomycin stopped. She has been put back on a tapering dose but still feels poorly. Additional symptoms include \"contraction\"-like pains across the mid-abdomen, occurring randomly, lasting just a few minutes, and wt loss of 24lbs. She has a chronic LLQ tender area. Her diarrhea is profuse, and watery; she reports most days having 5+ BMs between 1900-6842. Numerous GI meds and elimination/focused diets have not had an impact. She switched GI providers after recent presentation to [facility] ER looking for further evaluation. She has cut back on alcohol, as recommended by Dr Jonnathan Fitch.    Medications:   Current Outpatient Medications   Medication Sig    cholestyramine (QUESTRAN) 4 gram packet Take 1 Packet by mouth daily.  dicyclomine (BENTYL) 10 mg capsule Take 10 mg by mouth.  omeprazole (PRILOSEC) 20 mg capsule     ergocalciferol (ERGOCALCIFEROL) 1,250 mcg (50,000 unit) capsule Take 1 Cap by mouth every seven (7) days.  levonorgestreL (MIRENA) 20 mcg/24 hours (5 yrs) 52 mg IUD 1 Device by IntraUTERine route once.      No current facility-administered medications for this visit. Allergies: No Known Allergies     Past History:  Past Medical History:   Diagnosis Date    Abnormal Papanicolaou smear of cervix     12/2016 (postpartum) ASCUS, neg HPV     Encounter for insertion of mirena IUD 02/07/2017    Gastrointestinal disorder     c-diff     Headache     Hypertension     with last pregnancy    Preeclampsia     Trichomonas vaginitis 02/2020    pap w/ + trich      History reviewed. No pertinent surgical history. Family and Social History:  Family History   Problem Relation Age of Onset    Hypertension Mother     Migraines Mother     Thyroid Disease Mother     Lung Disease Maternal Grandmother     Diabetes Maternal Grandmother     Hypertension Maternal Grandmother     Diabetes Other     Alcohol abuse Neg Hx     Arthritis-rheumatoid Neg Hx     Bleeding Prob Neg Hx     Headache Neg Hx     Breast Cancer Neg Hx     Colon Cancer Neg Hx     Ovarian Cancer Neg Hx     Prostate Cancer Neg Hx      Social History     Socioeconomic History    Marital status: SINGLE     Spouse name: Not on file    Number of children: Not on file    Years of education: Not on file    Highest education level: Not on file   Occupational History    Not on file   Tobacco Use    Smoking status: Never Smoker    Smokeless tobacco: Never Used   Substance and Sexual Activity    Alcohol use: Yes     Comment: occasional    Drug use: No    Sexual activity: Not Currently     Partners: Male     Birth control/protection: I.U.D. Other Topics Concern    Not on file   Social History Narrative    Not on file     Social Determinants of Health     Financial Resource Strain:     Difficulty of Paying Living Expenses:    Food Insecurity:     Worried About Running Out of Food in the Last Year:     [address] in the Last Year:    Transportation Needs:     Lack of Transportation (Medical):      Lack of Transportation (Non-Medical): Physical Activity:     Days of Exercise per Week:     Minutes of Exercise per Session:    Stress:     Feeling of Stress :    Social Connections:     Frequency of Communication with Friends and Family:     Frequency of Social Gatherings with Friends and Family:     Attends Shinto Services:     Active Member of Clubs or Organizations:     Attends Club or Organization Meetings:     Marital Status:    Intimate Partner Violence:     Fear of Current or Ex-Partner:     Emotionally Abused:     Physically Abused:     Sexually Abused:        Review of Systems   Constitutional: Positive for weight loss. Negative for chills and fever. HENT: Negative for congestion, sinus pain and sore throat. Eyes: Positive for blurred vision and double vision. Negative for photophobia and pain. Glasses, stigmatism, Nearsightedness   Respiratory: Negative for cough, shortness of breath and wheezing. Cardiovascular: Negative for chest pain, palpitations and leg swelling. Gastrointestinal: Positive for abdominal pain, blood in stool, constipation, diarrhea, nausea and vomiting. Negative for heartburn and melena. Genitourinary: Negative for dysuria, frequency and urgency. Musculoskeletal: Positive for back pain. Negative for joint pain and neck pain. Skin: Negative for itching and rash. Biliary Dyskinesia ; daily nausea ; extreme diarrhea ; very painful   Neurological: Negative for dizziness, seizures, weakness and headaches. Endo/Heme/Allergies: Positive for environmental allergies. Negative for polydipsia. Does not bruise/bleed easily. Psychiatric/Behavioral: Negative for depression. The patient has insomnia. The patient is not nervous/anxious. Physical Exam  Vitals and nursing note reviewed. Constitutional:       Appearance: Normal appearance. She is well-developed. She is obese. She is not toxic-appearing or diaphoretic. HENT:      Head: Normocephalic and atraumatic.    Eyes: General: No scleral icterus. Conjunctiva/sclera: Conjunctivae normal.      Pupils: Pupils are equal, round, and reactive to light. Neck:      Thyroid: No thyromegaly. Vascular: No JVD. Trachea: No tracheal deviation. Cardiovascular:      Rate and Rhythm: Normal rate and regular rhythm. Heart sounds: No murmur heard. No friction rub. No gallop. Pulmonary:      Effort: Pulmonary effort is normal. No respiratory distress. Breath sounds: Normal breath sounds. No wheezing or rales. Abdominal:      General: Bowel sounds are normal.      Palpations: Abdomen is soft. There is no mass. Tenderness: There is abdominal tenderness (RUQ>epig>LUQ. minimal TTP lower abd). There is guarding. There is no rebound. Hernia: No hernia is present. Musculoskeletal:      Cervical back: Normal range of motion. Comments: No gross deformities   Skin:     General: Skin is warm. Neurological:      General: No focal deficit present. Mental Status: She is alert and oriented to person, place, and time. Cranial Nerves: No cranial nerve deficit. Psychiatric:         Mood and Affect: Mood normal.         Behavior: Behavior normal. Behavior is cooperative. Thought Content: Thought content normal.         Judgment: Judgment normal.      Comments: Mood surprisingly upbeat       DATA:  GI records reviewed. -5/10/21- (Rachel)EGD w/mild esophagitis, gastritis, duodenitis w/(-) H pylori, no celiac, no EoE. [place] unremarkable, negative random Bx  -5/13/21- (Rachel)CT with gallstone, hypodense small area left liver    MRI Results (most recent):  Results from Hospital Encounter encounter on 06/26/21    MRI ABD WO CONT    Narrative  MRI of the Abdomen    INDICATION: Left lower quadrant pain, liver lesion noted on outside CT scan    TECHNIQUE:  Routine axial and coronal MR sequences were obtained through the abdomen. MRCP  sequences were included.     COMPARISON: CT dated 05/28/2021    FINDINGS:  - LIVER: Liver measures 20.5 cm in length. There is diffuse fatty infiltration. There is also a discrete area of signal loss in the lateral segment of the left  lobe, best seen on opposed phase images. Appearance is consistent with focal  fatty change. This correlates with the lesion seen on outside CT. No  significant liver lesion is seen. Portal vein is patent. - GALLBLADDER/BILE DUCTS: Common duct measures 3  mm. There are multiple  gallstones. Gallbladder is not distended. - PANCREAS: Normal.  - SPLEEN: Normal.  - ADRENALS: Normal.  - KIDNEYS/URETERS: No hydronephrosis or significant mass.  - LYMPH NODES: No significant retroperitoneal or mesenteric adenopathy.  - BONES: No fracture or significant bone lesion.  - OTHER: No significant ascites. Impression  1. Mild hepatomegaly and steatosis. Focal fatty change in the lateral segment  of the left lobe. No significant liver lesion. 2.  Cholelithiasis. If there are any questions about this report, I can be reached on [employer]  or at [phone]     Nuclear Medicine Results (most recent):  Results from [place] encounter on 07/12/21    NM HEPATOBILIARY W INTERVENTION    Narrative  Nuclear Medicine Hepatobiliary Patency Study with GB Ejection Fraction    CLINICAL INDICATION: Gallstones. Abdominal pain. Diarrhea. COMPARISON: MR abdomen 6/26/2021 and CT abdomen and pelvis 5/28/2021    Radiopharmaceutical: 6.2 mCi Tc-99m Choletec IV. 1.9 mcg CCK. Findings:  Serial imaging of the abdomen shows prompt and homogeneous uptake of  tracer by the liver. Activity is rapidly excreted into the biliary system with  the gallbladder beginning to fill at approximately 35 minutes. By the end of  imaging the CBD and small bowel activity are seen. After the first portion of the test, patient was then given 1.9 mcg of CCK  analog Sincalide by slow IV injection over 30 minutes at a dose based on 0.02  UG/kg.  Gallbladder ejection fraction is 4%. For this technique, normal values  for adults are considered at least 35%. There was no pain response to the CCK. Impression  Cystic and common bile duct patent, with low Gallbladder EF of 4%. This can be seen with gallbladder dysfunction including chronic cholecystitis or  gallbladder dyskinesis syndrome. Lab Results   Component Value Date/Time    WBC 5.7 06/07/2021 06:14 PM    Hemoglobin (POC) 12.1 02/18/2020 02:30 PM    HGB 13.0 06/07/2021 06:14 PM    HCT 37.1 06/07/2021 06:14 PM    PLATELET 715 21/32/8051 06:14 PM    .1 (H) 06/07/2021 06:14 PM      Lab Results   Component Value Date/Time    ALT (SGPT) 57 06/07/2021 07:13 PM    AST (SGOT) 96 (H) 06/07/2021 07:13 PM    Alk. phosphatase 76 06/07/2021 07:13 PM    Bilirubin, total 0.7 06/07/2021 07:13 PM      ASSESSMENT and PLAN  Encounter Diagnoses   Name Primary?  Biliary dyskinesia Yes    Non-intractable vomiting with nausea     Diarrhea, unspecified type     Unintentional weight loss     Fatty liver disease, nonalcoholic      Symptoms are by no means typical for biliary dyskinesia or gallstone disease, but could represent an extreme  version of dyskinesia. In the absence of significant alternative findings, and with failure to respond to numerous other measures, cholecystectomy as a therapeutic test is reasonable. She is counseled that symptom improvement cannot be predicted in this situation and she fully understands; at this point, any degree improvement would be beneficial.    Discussed fatty liver disease, potential causes, and potential for progression to cirrhosis. Biopsy recommended to confirm FLD and r/o fibrosis. Will proceed with cholecystectomy and wedge liver biopsy. Technical details of the procedure are reviewed, including the non-standard single incision (SILS) technique if indicated.   Risks reviewed include anesthetic risks, bleeding, infection, visceral injury including bile leak, incomplete symptom resolution and persistent post-operative diarrhea as well as conversion to open technique. All questions are answered. rash

## 2021-07-23 ENCOUNTER — HOSPITAL ENCOUNTER (OUTPATIENT)
Dept: SURGERY | Age: 31
Discharge: HOME | End: 2021-07-23

## 2021-07-23 VITALS — HEIGHT: 64 IN | WEIGHT: 208 LBS | BODY MASS INDEX: 35.51 KG/M2

## 2021-07-23 RX ORDER — VANCOMYCIN HYDROCHLORIDE 125 MG/1
125 CAPSULE ORAL
COMMUNITY
End: 2022-02-08

## 2021-07-23 NOTE — PERIOP NOTES
Patient verified name and . Order for consent was found in EHR and matches case posting; patient verifies procedure. Permit for Laparoscopic Cholecystectomy, possible open, with wedge liver biopsy  ONE TIME Routine  Multi-Phase, Routine, ONE TIME, On 21 at 1345, For 1 occurrence, Permit for Laparoscopic Cholecystectomy, possible open, with wedge liver biopsy      Type 2 surgery, PAT phone assessment complete. Orders for PAT not received. Labs per surgeon: None  Labs per anesthesia protocol: Hgb    Patient instructed to come to [id number] [place] [address], [address] Monday-Friday 3219-1377 prior to DOS to have blood drawn and  surgical soap. No appointment necessary. Patient verbalized understanding      Patient has completed the [facility] vaccine series and vaccination record card found in EHR    Patient answered medical/surgical history questions at their best of ability. All prior to admission medications documented in [facility]. Patient instructed to take the following medications the day of surgery according to anesthesia guidelines with a small sip of water: Omprazole On the day before surgery please take Acetaminophen 1000mg in the morning and then again before bed. You may substitute for Tylenol 650 mg. Hold all vitamins 7 days prior to surgery and NSAIDS 5 days prior to surgery.  Prescription meds to hold:None        Patient instructed on the following:    > Arrive at A Entrance, time of arrival to be called the day before by 1700  > NPO after midnight including gum, mints, and ice chips  > Responsible adult must drive patient to the hospital, stay during surgery, and patient will need supervision 24 hours after anesthesia  > Use hibclens soap in shower the night before surgery and on the morning of surgery  > All piercings must be removed prior to arrival.    > Leave all valuables (money and jewelry) at home but bring insurance card and ID on DOS.   > You may be required to pay a deductible or co-pay on the day of your procedure. You can pre-pay by calling [phone] if your surgery is at the [facility]  or [phone] if your surgery is at the [facility]. > Do not wear make-up, nail polish, lotions, cologne, perfumes, powders, or oil on skin. Artificial nails are not permitted.

## 2021-07-27 ENCOUNTER — HOSPITAL ENCOUNTER (OUTPATIENT)
Dept: LAB | Age: 31
Discharge: HOME | End: 2021-07-27
Attending: SURGERY
Payer: COMMERCIAL

## 2021-07-27 LAB — HGB BLD-MCNC: 12.3 G/DL (ref 11.7–15.4)

## 2021-07-27 PROCEDURE — 85018 HEMOGLOBIN: CPT

## 2021-07-27 PROCEDURE — 36415 COLL VENOUS BLD VENIPUNCTURE: CPT

## 2021-07-27 NOTE — PERIOP NOTES
Lab within anesthesia guidelines  Hgb 12.3  Recent Results (from the past 12 hour(s))   HEMOGLOBIN    Collection Time: 07/27/21  8:43 AM   Result Value Ref Range    HGB 12.3 11.7 - 15.4 g/dL

## 2021-07-29 ENCOUNTER — ANESTHESIA EVENT (OUTPATIENT)
Dept: SURGERY | Age: 31
End: 2021-07-29
Payer: COMMERCIAL

## 2021-07-30 ENCOUNTER — ANESTHESIA (OUTPATIENT)
Dept: SURGERY | Age: 31
End: 2021-07-30
Payer: COMMERCIAL

## 2021-07-30 ENCOUNTER — HOSPITAL ENCOUNTER (OUTPATIENT)
Age: 31
Setting detail: OUTPATIENT SURGERY
Discharge: HOME | End: 2021-07-30
Attending: SURGERY | Admitting: SURGERY
Payer: COMMERCIAL

## 2021-07-30 VITALS
OXYGEN SATURATION: 93 % | WEIGHT: 206 LBS | SYSTOLIC BLOOD PRESSURE: 131 MMHG | HEIGHT: 64 IN | BODY MASS INDEX: 35.17 KG/M2 | TEMPERATURE: 97.7 F | HEART RATE: 86 BPM | RESPIRATION RATE: 16 BRPM | DIASTOLIC BLOOD PRESSURE: 86 MMHG

## 2021-07-30 DIAGNOSIS — K76.0 FATTY LIVER: ICD-10-CM

## 2021-07-30 DIAGNOSIS — K80.20 GALLSTONES: Primary | ICD-10-CM

## 2021-07-30 LAB — HCG UR QL: NEGATIVE

## 2021-07-30 PROCEDURE — 74011250637 HC RX REV CODE- 250/637: Performed by: ANESTHESIOLOGY

## 2021-07-30 PROCEDURE — 76210000006 HC OR PH I REC 0.5 TO 1 HR: Performed by: SURGERY

## 2021-07-30 PROCEDURE — 74011000250 HC RX REV CODE- 250: Performed by: NURSE ANESTHETIST, CERTIFIED REGISTERED

## 2021-07-30 PROCEDURE — 76060000033 HC ANESTHESIA 1 TO 1.5 HR: Performed by: SURGERY

## 2021-07-30 PROCEDURE — 88304 TISSUE EXAM BY PATHOLOGIST: CPT

## 2021-07-30 PROCEDURE — 77030034154 HC SHR COAG HARM ACE J&J -F: Performed by: SURGERY

## 2021-07-30 PROCEDURE — 77030000038 HC TIP SCIS LAPSCP SURI -B: Performed by: SURGERY

## 2021-07-30 PROCEDURE — 77030008518 HC TBNG INSUF ENDO STRY -B: Performed by: SURGERY

## 2021-07-30 PROCEDURE — 77030039425 HC BLD LARYNG TRULITE DISP TELE -A: Performed by: ANESTHESIOLOGY

## 2021-07-30 PROCEDURE — 74011250636 HC RX REV CODE- 250/636: Performed by: NURSE ANESTHETIST, CERTIFIED REGISTERED

## 2021-07-30 PROCEDURE — 77030008756 HC TU IRR SUC STRY -B: Performed by: SURGERY

## 2021-07-30 PROCEDURE — 76010000160 HC OR TIME 0.5 TO 1 HR INTENSV-TIER 1: Performed by: SURGERY

## 2021-07-30 PROCEDURE — 47379 UNLISTED LAPS PX LIVER: CPT | Performed by: SURGERY

## 2021-07-30 PROCEDURE — 77030031139 HC SUT VCRL2 J&J -A: Performed by: SURGERY

## 2021-07-30 PROCEDURE — 74011000250 HC RX REV CODE- 250: Performed by: SURGERY

## 2021-07-30 PROCEDURE — 76210000020 HC REC RM PH II FIRST 0.5 HR: Performed by: SURGERY

## 2021-07-30 PROCEDURE — 81025 URINE PREGNANCY TEST: CPT

## 2021-07-30 PROCEDURE — 77030012770 HC TRCR OPT FX AMR -B: Performed by: SURGERY

## 2021-07-30 PROCEDURE — 77030037088 HC TUBE ENDOTRACH ORAL NSL COVD-A: Performed by: ANESTHESIOLOGY

## 2021-07-30 PROCEDURE — 77030007955 HC PCH ENDOSC SPEC J&J -B: Performed by: SURGERY

## 2021-07-30 PROCEDURE — 88307 TISSUE EXAM BY PATHOLOGIST: CPT

## 2021-07-30 PROCEDURE — 2709999900 HC NON-CHARGEABLE SUPPLY: Performed by: SURGERY

## 2021-07-30 PROCEDURE — 47562 LAPAROSCOPIC CHOLECYSTECTOMY: CPT | Performed by: SURGERY

## 2021-07-30 PROCEDURE — 77030040922 HC BLNKT HYPOTHRM STRY -A: Performed by: ANESTHESIOLOGY

## 2021-07-30 PROCEDURE — 77030035048 HC TRCR ENDOSC OPTCL COVD -B: Performed by: SURGERY

## 2021-07-30 PROCEDURE — 77030040361 HC SLV COMPR DVT MDII -B: Performed by: SURGERY

## 2021-07-30 PROCEDURE — 74011250636 HC RX REV CODE- 250/636: Performed by: ANESTHESIOLOGY

## 2021-07-30 PROCEDURE — 77030010513 HC APPL CLP LIG J&J -C: Performed by: SURGERY

## 2021-07-30 PROCEDURE — 88313 SPECIAL STAINS GROUP 2: CPT

## 2021-07-30 PROCEDURE — 88312 SPECIAL STAINS GROUP 1: CPT

## 2021-07-30 RX ORDER — ONDANSETRON HYDROCHLORIDE 8 MG/1
8 TABLET, FILM COATED ORAL
Qty: 10 TABLET | Refills: 0 | Status: SHIPPED | OUTPATIENT
Start: 2021-07-30

## 2021-07-30 RX ORDER — HYDROCODONE BITARTRATE AND ACETAMINOPHEN 5; 325 MG/1; MG/1
2 TABLET ORAL AS NEEDED
Status: DISCONTINUED | OUTPATIENT
Start: 2021-07-30 | End: 2021-07-30 | Stop reason: HOSPADM

## 2021-07-30 RX ORDER — ONDANSETRON 2 MG/ML
INJECTION INTRAMUSCULAR; INTRAVENOUS AS NEEDED
Status: DISCONTINUED | OUTPATIENT
Start: 2021-07-30 | End: 2021-07-30 | Stop reason: HOSPADM

## 2021-07-30 RX ORDER — PROPOFOL 10 MG/ML
INJECTION, EMULSION INTRAVENOUS AS NEEDED
Status: DISCONTINUED | OUTPATIENT
Start: 2021-07-30 | End: 2021-07-30 | Stop reason: HOSPADM

## 2021-07-30 RX ORDER — KETOROLAC TROMETHAMINE 30 MG/ML
INJECTION, SOLUTION INTRAMUSCULAR; INTRAVENOUS AS NEEDED
Status: DISCONTINUED | OUTPATIENT
Start: 2021-07-30 | End: 2021-07-30 | Stop reason: HOSPADM

## 2021-07-30 RX ORDER — LIDOCAINE HYDROCHLORIDE 20 MG/ML
INJECTION, SOLUTION EPIDURAL; INFILTRATION; INTRACAUDAL; PERINEURAL AS NEEDED
Status: DISCONTINUED | OUTPATIENT
Start: 2021-07-30 | End: 2021-07-30 | Stop reason: HOSPADM

## 2021-07-30 RX ORDER — OXYCODONE HYDROCHLORIDE 5 MG/1
5 TABLET ORAL
Status: DISCONTINUED | OUTPATIENT
Start: 2021-07-30 | End: 2021-07-30 | Stop reason: HOSPADM

## 2021-07-30 RX ORDER — DEXAMETHASONE SODIUM PHOSPHATE 4 MG/ML
INJECTION, SOLUTION INTRA-ARTICULAR; INTRALESIONAL; INTRAMUSCULAR; INTRAVENOUS; SOFT TISSUE AS NEEDED
Status: DISCONTINUED | OUTPATIENT
Start: 2021-07-30 | End: 2021-07-30 | Stop reason: HOSPADM

## 2021-07-30 RX ORDER — HYDROMORPHONE HYDROCHLORIDE 2 MG/ML
0.5 INJECTION, SOLUTION INTRAMUSCULAR; INTRAVENOUS; SUBCUTANEOUS
Status: DISCONTINUED | OUTPATIENT
Start: 2021-07-30 | End: 2021-07-30 | Stop reason: HOSPADM

## 2021-07-30 RX ORDER — ROCURONIUM BROMIDE 10 MG/ML
INJECTION, SOLUTION INTRAVENOUS AS NEEDED
Status: DISCONTINUED | OUTPATIENT
Start: 2021-07-30 | End: 2021-07-30 | Stop reason: HOSPADM

## 2021-07-30 RX ORDER — OXYCODONE AND ACETAMINOPHEN 10; 325 MG/1; MG/1
1 TABLET ORAL ONCE
Status: COMPLETED | OUTPATIENT
Start: 2021-07-30 | End: 2021-07-30

## 2021-07-30 RX ORDER — FENTANYL CITRATE 50 UG/ML
INJECTION, SOLUTION INTRAMUSCULAR; INTRAVENOUS AS NEEDED
Status: DISCONTINUED | OUTPATIENT
Start: 2021-07-30 | End: 2021-07-30 | Stop reason: HOSPADM

## 2021-07-30 RX ORDER — SODIUM CHLORIDE, SODIUM LACTATE, POTASSIUM CHLORIDE, CALCIUM CHLORIDE 600; 310; 30; 20 MG/100ML; MG/100ML; MG/100ML; MG/100ML
INJECTION, SOLUTION INTRAVENOUS
Status: DISCONTINUED | OUTPATIENT
Start: 2021-07-30 | End: 2021-07-30 | Stop reason: HOSPADM

## 2021-07-30 RX ORDER — SODIUM CHLORIDE, SODIUM LACTATE, POTASSIUM CHLORIDE, CALCIUM CHLORIDE 600; 310; 30; 20 MG/100ML; MG/100ML; MG/100ML; MG/100ML
75 INJECTION, SOLUTION INTRAVENOUS CONTINUOUS
Status: DISCONTINUED | OUTPATIENT
Start: 2021-07-30 | End: 2021-07-30 | Stop reason: HOSPADM

## 2021-07-30 RX ORDER — HYDROCODONE BITARTRATE AND ACETAMINOPHEN 5; 325 MG/1; MG/1
TABLET ORAL
Qty: 20 TABLET | Refills: 0 | Status: SHIPPED | OUTPATIENT
Start: 2021-07-30 | End: 2021-08-06

## 2021-07-30 RX ORDER — LIDOCAINE HYDROCHLORIDE 10 MG/ML
0.1 INJECTION INFILTRATION; PERINEURAL AS NEEDED
Status: DISCONTINUED | OUTPATIENT
Start: 2021-07-30 | End: 2021-07-30 | Stop reason: HOSPADM

## 2021-07-30 RX ORDER — BUPIVACAINE HYDROCHLORIDE AND EPINEPHRINE 5; 5 MG/ML; UG/ML
INJECTION, SOLUTION EPIDURAL; INTRACAUDAL; PERINEURAL AS NEEDED
Status: DISCONTINUED | OUTPATIENT
Start: 2021-07-30 | End: 2021-07-30 | Stop reason: HOSPADM

## 2021-07-30 RX ORDER — FENTANYL CITRATE 50 UG/ML
100 INJECTION, SOLUTION INTRAMUSCULAR; INTRAVENOUS ONCE
Status: DISCONTINUED | OUTPATIENT
Start: 2021-07-30 | End: 2021-07-30 | Stop reason: HOSPADM

## 2021-07-30 RX ORDER — MIDAZOLAM HYDROCHLORIDE 1 MG/ML
2 INJECTION, SOLUTION INTRAMUSCULAR; INTRAVENOUS
Status: COMPLETED | OUTPATIENT
Start: 2021-07-30 | End: 2021-07-30

## 2021-07-30 RX ADMIN — HYDROMORPHONE HYDROCHLORIDE 0.5 MG: 2 INJECTION, SOLUTION INTRAMUSCULAR; INTRAVENOUS; SUBCUTANEOUS at 12:24

## 2021-07-30 RX ADMIN — DEXAMETHASONE SODIUM PHOSPHATE 10 MG: 4 INJECTION, SOLUTION INTRAMUSCULAR; INTRAVENOUS at 11:14

## 2021-07-30 RX ADMIN — SODIUM CHLORIDE, SODIUM LACTATE, POTASSIUM CHLORIDE, AND CALCIUM CHLORIDE 75 ML/HR: 600; 310; 30; 20 INJECTION, SOLUTION INTRAVENOUS at 10:19

## 2021-07-30 RX ADMIN — HYDROMORPHONE HYDROCHLORIDE 0.5 MG: 2 INJECTION, SOLUTION INTRAMUSCULAR; INTRAVENOUS; SUBCUTANEOUS at 12:08

## 2021-07-30 RX ADMIN — FENTANYL CITRATE 100 MCG: 50 INJECTION INTRAMUSCULAR; INTRAVENOUS at 11:14

## 2021-07-30 RX ADMIN — SODIUM CHLORIDE, SODIUM LACTATE, POTASSIUM CHLORIDE, AND CALCIUM CHLORIDE: 600; 310; 30; 20 INJECTION, SOLUTION INTRAVENOUS at 11:05

## 2021-07-30 RX ADMIN — ROCURONIUM BROMIDE 40 MG: 10 INJECTION, SOLUTION INTRAVENOUS at 11:14

## 2021-07-30 RX ADMIN — SODIUM CHLORIDE, SODIUM LACTATE, POTASSIUM CHLORIDE, AND CALCIUM CHLORIDE: 600; 310; 30; 20 INJECTION, SOLUTION INTRAVENOUS at 11:38

## 2021-07-30 RX ADMIN — SUGAMMADEX 400 MG: 100 INJECTION, SOLUTION INTRAVENOUS at 11:48

## 2021-07-30 RX ADMIN — OXYCODONE HYDROCHLORIDE AND ACETAMINOPHEN 1 TABLET: 10; 325 TABLET ORAL at 12:45

## 2021-07-30 RX ADMIN — MIDAZOLAM 2 MG: 1 INJECTION INTRAMUSCULAR; INTRAVENOUS at 10:58

## 2021-07-30 RX ADMIN — LIDOCAINE HYDROCHLORIDE 100 MG: 20 INJECTION, SOLUTION EPIDURAL; INFILTRATION; INTRACAUDAL; PERINEURAL at 11:14

## 2021-07-30 RX ADMIN — KETOROLAC TROMETHAMINE 30 MG: 30 INJECTION, SOLUTION INTRAMUSCULAR; INTRAVENOUS at 11:48

## 2021-07-30 RX ADMIN — ONDANSETRON 4 MG: 2 INJECTION INTRAMUSCULAR; INTRAVENOUS at 11:14

## 2021-07-30 RX ADMIN — PROPOFOL 200 MG: 10 INJECTION, EMULSION INTRAVENOUS at 11:14

## 2021-07-30 NOTE — OP NOTES
Operative Report    Patient: Ascencion Recinos MRN: [id number]      YOB: 1990  Age: 27 y.o. Sex: female       Date of Surgery: 7/30/2021     Preoperative Diagnosis: Biliary dyskinesia [K82.8]  Fatty liver [K76.0]     Postoperative Diagnosis: Biliary dyskinesia [K82.8]  Fatty liver [K76.0]     Procedure:    Laparoscopic Cholecystectomy -Single Incision/SILS  Laparoscopic wedge biopsy of the liver-SILS    Anesthesia: General   Complications: none  Estimated Blood Loss: per anesthesia    Indications:  As outlined in the History and Physical.  Single incision technique is planned to provide the patient with the benefit of less incisional pain and improved cosmesis due to fewer incisions as well as the potential for lower risk of wound infection. This technique is significantly more intricate than standard laparoscopic techniques and typically increases the complexity of the procedure by 10-20%. Procedure in Detail:   Informed consent was obtained and the patient was brought to the operating room and placed on the table in supine position with adequate padding of all pressure points and compression devices on both lower extremities. After the successful induction of general anesthesia, the patients abdomen was prepped and draped sterilely. Under direct laparoscopic visualization and additional local anesthetic, a 5mm optiview-type Trocar was inserted through a curved infraumbilical  incision and pneumoperitoneum was created. Visual exploration revealed no immediately apparent pathology. The liver was, however, noted to be mildly hypertrophic with a significant diffuse fatty appearance. An additional 5mm Trocar was placed through the incision  and a [place] grasper was inserted alongside that trocar sleeve. The visibly normal gallbladder was grasped by its neck and retracted anterolaterally.  The tissues investing this area were incised and blunt dissection was used to skeletonize the normal caliber cystic duct for approximately 10mm. A generous window was created behind the duct allowing identification of the cystic artery, which was traced to the gallbladder wall and divided with the harmonic scalpel. This permitted  generation of a large window behind the neck of the gallbladder allowing a near 360 degree view of the region to confirm normal anatomy of the origin of the cystic duct. A clip was then placed across the neck of the gallbladder and two on the duct 0.5 cm medially and it was transected. The gallbladder was excised with the harmonic. The liver was noted to demonstrate significant fatty infiltration as noted above. The harmonic scalpel was used to excise a generous 1 cubiccm wedge from the right anterior margin. The site was confirmed to be hemostatic with the harmonic scalpel. After confirming hemostasis of the liver bed, the specimens were placed in a pouch which was extracted through the umbilical site. Pneumoperitoneum was released and the trocars were removed. The fascia was closed with a figure-of-eight 0-Vicryl,  then the incision was  made hemostatic with cautery and closed with subcuticular 4-0 Vicryl and Steri-Strips were applied. During this procedure I performed a laparoscopic wedge liver biopsy. There is currently no CPT code available for laparoscopic wedge liver biopsy therefore unlisted code CPT 89065 is being reported in comparison to CPT 79888. The laparoscopic approach is generally more beneficial, as it has a quicker recovery time and less pain for the patient. The work and time involved in laparoscopic approach is about the same as with the open approach. The patient tolerated the procedure well. There were no immediate apparent complications. She was awakened from anesthesia and extubated in the operating room, taken to recovery in satisfactory condition.            Specimens:   ID Type Source Tests Collected by Time Destination   1 : GALLBLADDER Preservative   India Leos MD 7/30/2021 1132 Pathology   2 : LIVER BIOPSY Preservative   India Leos MD 7/30/2021 1132 Pathology           Counts: Sponge and needle counts were correct.     Signed By:  Noemí Bailey MD     July 30, 2021

## 2021-07-30 NOTE — INTERVAL H&P NOTE
Update History & Physical    The Patient's History and Physical was reviewed with the patient and I examined the patient. There was no change. The surgical site was confirmed by the patient and me. Plan:  The risk, benefits, expected outcome, and alternative to the recommended procedure have been discussed with the patient. Patient understands and wants to proceed with the procedure.     Electronically signed by Patric Masters MD on 7/30/2021 at 10:52 AM

## 2021-07-30 NOTE — PERIOP NOTES
Discussed pain mgmt with Dr. Norbert Ellis. Pt drowsy/calm/quiet but reports pain 9/10 after 1 mg dilaudid. OK to give 10 mg Percocet as she has not had any acetaminophen today. She has had Toradol.

## 2021-07-30 NOTE — ANESTHESIA PREPROCEDURE EVALUATION
Relevant Problems   ENDOCRINE   (+) Severe obesity (HCC)       Anesthetic History   No history of anesthetic complications            Review of Systems / Medical History  Patient summary reviewed and pertinent labs reviewed    Pulmonary  Within defined limits                 Neuro/Psych   Within defined limits           Cardiovascular  Within defined limits                Exercise tolerance: >4 METS     GI/Hepatic/Renal     GERD: well controlled           Endo/Other        Obesity     Other Findings              Physical Exam    Airway  Mallampati: II  TM Distance: 4 - 6 cm  Neck ROM: normal range of motion   Mouth opening: Normal     Cardiovascular  Regular rate and rhythm,  S1 and S2 normal,  no murmur, click, rub, or gallop             Dental         Pulmonary  Breath sounds clear to auscultation               Abdominal         Other Findings            Anesthetic Plan    ASA: 2  Anesthesia type: general          Induction: Intravenous  Anesthetic plan and risks discussed with: Patient and Family

## 2021-07-30 NOTE — DISCHARGE INSTRUCTIONS
Luly Leal M.D.  [phone]    Instructions following Laparoscopic Cholecystectomy:    ACTIVITY:   Try to take a few short walks with help around the house later today. It is very important to take short walks to avoid blood clots and pneumonia.  You may be light-headed or sleepy from anesthesia, so be careful going up and down stairs.  Avoid any activity that may be dangerous or involves heavy objects for 24-48 hours. DIET:   Drink only clear, non-carbonated liquids when you get home (sugar-free if you are diabetic), such as Gatorade, chicken broth, etc.   Tomorrow start soft foods such as grits and eggs, mashed potatoes, yogurt, cottage cheese, etc. Remain on soft foods for at least 24-36 hours then you may eat whatever foods you wish.  Many people experience nausea for a day or so after surgery, and many people have loose stools or diarrhea immediately after gallbladder surgery. It is also not uncommon to not have a bowel movement for 2-3 days. Using Miralax or other over the counter laxative is fine. PAIN:   You will be given a prescription for pain medication and nausea.  Try to take the pain medication with food, even a few crackers.  You may also use Tylenol, Motrin, Advil, or Aleve instead of the prescription pain medication. Do no take Tylenol and the prescription pain medication within 4 hours of each other.  URINARY RETENTION: If you are unable to empty your bladder within 6 hours after returning home, please go to your nearest Emergency Department or Urgent Care for urinary catheterization. WOUND CARE:   You may shower the day after surgery   It is not uncommon for the incisions to ooze or drain blood-tinged fluid. Cover the area with gauze if the drainage continues.  Incisions will sometimes develop redness around them, up to the size of a quarter, as well as a hard lumpy feel. If this redness continues to get larger, please call the office.     PLEASE NOTE: it is not uncommon to have pain and a \"knot\" to the left of the belly button. CALL THE DOCTOR IF:   You have a temperature higher than 101.5° Fahrenheit for more than 6 hours.  You have severe nausea or vomiting not relieved by medication; or diarrhea.  You develop increasing redness or infection at the incision. Continue home medications as previously prescribed.

## 2021-07-30 NOTE — ANESTHESIA POSTPROCEDURE EVALUATION
Procedure(s):  CHOLECYSTECTOMY LAPAROSCOPIC  LIVER BIOPSY. general    Anesthesia Post Evaluation      Multimodal analgesia: multimodal analgesia used between 6 hours prior to anesthesia start to PACU discharge  Patient location during evaluation: PACU  Patient participation: complete - patient participated  Level of consciousness: awake and alert  Pain score: 0  Pain management: adequate  Airway patency: patent  Anesthetic complications: no  Cardiovascular status: acceptable and hemodynamically stable  Respiratory status: acceptable and spontaneous ventilation  Hydration status: acceptable  Post anesthesia nausea and vomiting:  none  Final Post Anesthesia Temperature Assessment:  Normothermia (36.0-37.5 degrees C)      INITIAL Post-op Vital signs:   Vitals Value Taken Time   /86 07/30/21 1245   Temp 36.5 °C (97.7 °F) 07/30/21 1207   Pulse 92 07/30/21 1251   Resp 16 07/30/21 1245   SpO2 95 % 07/30/21 1251   Vitals shown include unvalidated device data.

## 2021-08-11 PROBLEM — Z90.49 S/P LAPAROSCOPIC CHOLECYSTECTOMY: Status: ACTIVE | Noted: 2021-08-11

## 2021-09-13 ENCOUNTER — TRANSCRIBE ORDER (OUTPATIENT)
Dept: SCHEDULING | Age: 31
End: 2021-09-13

## 2021-09-13 DIAGNOSIS — R11.2 NAUSEA AND VOMITING, INTRACTABILITY OF VOMITING NOT SPECIFIED, UNSPECIFIED VOMITING TYPE: Primary | ICD-10-CM

## 2021-10-07 ENCOUNTER — HOSPITAL ENCOUNTER (OUTPATIENT)
Dept: NUCLEAR MEDICINE | Age: 31
Discharge: HOME | End: 2021-10-07
Attending: INTERNAL MEDICINE
Payer: COMMERCIAL

## 2021-10-07 DIAGNOSIS — R11.2 NAUSEA AND VOMITING, INTRACTABILITY OF VOMITING NOT SPECIFIED, UNSPECIFIED VOMITING TYPE: ICD-10-CM

## 2021-10-07 PROCEDURE — 78264 GASTRIC EMPTYING IMG STUDY: CPT

## 2022-01-20 ENCOUNTER — TRANSCRIBE ORDER (OUTPATIENT)
Dept: REGISTRATION | Age: 32
End: 2022-01-20

## 2022-01-20 DIAGNOSIS — R11.2 NAUSEA AND VOMITING, INTRACTABILITY OF VOMITING NOT SPECIFIED, UNSPECIFIED VOMITING TYPE: Primary | ICD-10-CM

## 2022-01-20 DIAGNOSIS — F10.11 HISTORY OF ALCOHOL ABUSE: ICD-10-CM

## 2022-01-20 DIAGNOSIS — K29.80 DUODENITIS: ICD-10-CM

## 2022-01-20 DIAGNOSIS — K29.70 GASTRITIS WITHOUT BLEEDING, UNSPECIFIED CHRONICITY, UNSPECIFIED GASTRITIS TYPE: ICD-10-CM

## 2022-01-20 DIAGNOSIS — K20.90 ESOPHAGITIS: ICD-10-CM

## 2022-01-28 ENCOUNTER — HOSPITAL ENCOUNTER (OUTPATIENT)
Dept: CT IMAGING | Age: 32
Discharge: HOME | End: 2022-01-28
Attending: INTERNAL MEDICINE
Payer: COMMERCIAL

## 2022-01-28 DIAGNOSIS — K20.90 ESOPHAGITIS: ICD-10-CM

## 2022-01-28 DIAGNOSIS — K29.70 GASTRITIS WITHOUT BLEEDING, UNSPECIFIED CHRONICITY, UNSPECIFIED GASTRITIS TYPE: ICD-10-CM

## 2022-01-28 DIAGNOSIS — K29.80 DUODENITIS: ICD-10-CM

## 2022-01-28 DIAGNOSIS — R11.2 NAUSEA AND VOMITING, INTRACTABILITY OF VOMITING NOT SPECIFIED, UNSPECIFIED VOMITING TYPE: ICD-10-CM

## 2022-01-28 DIAGNOSIS — F10.11 HISTORY OF ALCOHOL ABUSE: ICD-10-CM

## 2022-01-28 PROCEDURE — 74177 CT ABD & PELVIS W/CONTRAST: CPT

## 2022-01-28 PROCEDURE — 74011000258 HC RX REV CODE- 258: Performed by: INTERNAL MEDICINE

## 2022-01-28 PROCEDURE — 74011000636 HC RX REV CODE- 636: Performed by: INTERNAL MEDICINE

## 2022-01-28 RX ORDER — SODIUM CHLORIDE 0.9 % (FLUSH) 0.9 %
10 SYRINGE (ML) INJECTION
Status: COMPLETED | OUTPATIENT
Start: 2022-01-28 | End: 2022-01-28

## 2022-01-28 RX ADMIN — SODIUM CHLORIDE 100 ML: 9 INJECTION, SOLUTION INTRAVENOUS at 14:58

## 2022-01-28 RX ADMIN — DIATRIZOATE MEGLUMINE AND DIATRIZOATE SODIUM 15 ML: 660; 100 LIQUID ORAL; RECTAL at 14:58

## 2022-01-28 RX ADMIN — Medication 10 ML: at 14:58

## 2022-01-28 RX ADMIN — IOPAMIDOL 100 ML: 755 INJECTION, SOLUTION INTRAVENOUS at 14:58

## 2022-03-19 PROBLEM — Z90.49 S/P LAPAROSCOPIC CHOLECYSTECTOMY: Status: ACTIVE | Noted: 2021-08-11

## 2022-03-19 PROBLEM — E66.01 SEVERE OBESITY (HCC): Status: ACTIVE | Noted: 2020-03-18

## 2022-03-19 PROBLEM — Z97.5 IUD (INTRAUTERINE DEVICE) IN PLACE: Status: ACTIVE | Noted: 2017-02-07

## 2022-07-07 ENCOUNTER — APPOINTMENT (OUTPATIENT)
Dept: CT IMAGING | Age: 32
End: 2022-07-07
Payer: COMMERCIAL

## 2022-07-07 ENCOUNTER — APPOINTMENT (OUTPATIENT)
Dept: ULTRASOUND IMAGING | Age: 32
End: 2022-07-07
Payer: COMMERCIAL

## 2022-07-07 ENCOUNTER — HOSPITAL ENCOUNTER (EMERGENCY)
Age: 32
Discharge: HOME | End: 2022-07-07
Attending: EMERGENCY MEDICINE
Payer: COMMERCIAL

## 2022-07-07 VITALS
TEMPERATURE: 98.6 F | SYSTOLIC BLOOD PRESSURE: 126 MMHG | HEART RATE: 104 BPM | WEIGHT: 187 LBS | RESPIRATION RATE: 18 BRPM | HEIGHT: 64 IN | OXYGEN SATURATION: 99 % | DIASTOLIC BLOOD PRESSURE: 78 MMHG | BODY MASS INDEX: 31.92 KG/M2

## 2022-07-07 DIAGNOSIS — N70.91 SALPINGITIS: ICD-10-CM

## 2022-07-07 DIAGNOSIS — N39.0 URINARY TRACT INFECTION WITHOUT HEMATURIA, SITE UNSPECIFIED: Primary | ICD-10-CM

## 2022-07-07 LAB
ALBUMIN SERPL-MCNC: 4.2 G/DL (ref 3.5–5)
ALBUMIN/GLOB SERPL: 1.2 {RATIO}
ALP SERPL-CCNC: 120 U/L (ref 45–117)
ALT SERPL-CCNC: 42 U/L (ref 13–61)
ANION GAP SERPL CALC-SCNC: 12 MMOL/L (ref 7–16)
APPEARANCE UR: ABNORMAL
AST SERPL-CCNC: 42 U/L (ref 15–37)
BACTERIA URNS QL MICRO: ABNORMAL /HPF
BASOPHILS # BLD: 0 K/UL (ref 0–0.2)
BASOPHILS NFR BLD: 0 % (ref 0–2)
BILIRUB SERPL-MCNC: 1.2 MG/DL (ref 0.2–1.1)
BILIRUB UR QL: ABNORMAL
BUN SERPL-MCNC: 4 MG/DL (ref 7–18)
CALCIUM SERPL-MCNC: 9.6 MG/DL (ref 8.3–10.4)
CASTS URNS QL MICRO: 0 /LPF
CHLORIDE SERPL-SCNC: 101 MMOL/L (ref 98–107)
CO2 SERPL-SCNC: 25 MMOL/L (ref 21–32)
COLOR UR: ABNORMAL
CREAT SERPL-MCNC: 0.46 MG/DL (ref 0.6–1)
CRYSTALS URNS QL MICRO: 0 /LPF
DIFFERENTIAL METHOD BLD: ABNORMAL
EOSINOPHIL # BLD: 0.1 K/UL (ref 0–0.8)
EOSINOPHIL NFR BLD: 1 % (ref 0.5–7.8)
EPI CELLS #/AREA URNS HPF: ABNORMAL /HPF
ERYTHROCYTE [DISTWIDTH] IN BLOOD BY AUTOMATED COUNT: 14.6 % (ref 11.9–14.6)
GLOBULIN SER CALC-MCNC: 3.5 G/DL (ref 2.3–3.5)
GLUCOSE SERPL-MCNC: 108 MG/DL (ref 65–100)
GLUCOSE UR STRIP.AUTO-MCNC: NEGATIVE MG/DL
HCG UR QL: NEGATIVE
HCT VFR BLD AUTO: 34.4 % (ref 35.8–46.3)
HGB BLD-MCNC: 11.7 G/DL (ref 11.7–15.4)
HGB UR QL STRIP: ABNORMAL
IMM GRANULOCYTES # BLD AUTO: 0.1 K/UL (ref 0–0.5)
IMM GRANULOCYTES NFR BLD AUTO: 1 % (ref 0–5)
KETONES UR QL STRIP.AUTO: 15 MG/DL
LEUKOCYTE ESTERASE UR QL STRIP.AUTO: NEGATIVE
LIPASE SERPL-CCNC: 453 U/L (ref 13–60)
LYMPHOCYTES # BLD: 1.2 K/UL (ref 0.5–4.6)
LYMPHOCYTES NFR BLD: 10 % (ref 13–44)
MCH RBC QN AUTO: 34.8 PG (ref 26.1–32.9)
MCHC RBC AUTO-ENTMCNC: 34 G/DL (ref 31.4–35)
MCV RBC AUTO: 102.4 FL (ref 79.6–97.8)
MONOCYTES # BLD: 0.7 K/UL (ref 0.1–1.3)
MONOCYTES NFR BLD: 6 % (ref 4–12)
MUCOUS THREADS URNS QL MICRO: ABNORMAL /LPF
NEUTS SEG # BLD: 10 K/UL (ref 1.7–8.2)
NEUTS SEG NFR BLD: 83 % (ref 43–78)
NITRITE UR QL STRIP.AUTO: POSITIVE
NRBC # BLD: 0 K/UL (ref 0–0.2)
OTHER OBSERVATIONS: ABNORMAL
PH UR STRIP: 5.5 [PH] (ref 5–9)
PLATELET # BLD AUTO: 359 K/UL (ref 150–450)
PMV BLD AUTO: 10 FL (ref 9.4–12.3)
POTASSIUM SERPL-SCNC: 3.7 MMOL/L (ref 3.5–5.1)
PROT SERPL-MCNC: 7.7 G/DL (ref 6.4–8.2)
PROT UR STRIP-MCNC: 30 MG/DL
RBC # BLD AUTO: 3.36 M/UL (ref 4.05–5.2)
RBC #/AREA URNS HPF: ABNORMAL /HPF
SERVICE CMNT-IMP: NORMAL
SODIUM SERPL-SCNC: 138 MMOL/L (ref 136–145)
SP GR UR REFRACTOMETRY: >=1.03 (ref 1–1.02)
UROBILINOGEN UR QL STRIP.AUTO: 0.2 EU/DL (ref 0.2–1)
WBC # BLD AUTO: 12.1 K/UL (ref 4.3–11.1)
WBC URNS QL MICRO: ABNORMAL /HPF
WET PREP GENITAL: NORMAL

## 2022-07-07 PROCEDURE — 2580000003 HC RX 258: Performed by: EMERGENCY MEDICINE

## 2022-07-07 PROCEDURE — 6360000004 HC RX CONTRAST MEDICATION: Performed by: EMERGENCY MEDICINE

## 2022-07-07 PROCEDURE — 74177 CT ABD & PELVIS W/CONTRAST: CPT

## 2022-07-07 PROCEDURE — 81015 MICROSCOPIC EXAM OF URINE: CPT

## 2022-07-07 PROCEDURE — 96365 THER/PROPH/DIAG IV INF INIT: CPT

## 2022-07-07 PROCEDURE — 85025 COMPLETE CBC W/AUTO DIFF WBC: CPT

## 2022-07-07 PROCEDURE — 80053 COMPREHEN METABOLIC PANEL: CPT

## 2022-07-07 PROCEDURE — 83690 ASSAY OF LIPASE: CPT

## 2022-07-07 PROCEDURE — 81025 URINE PREGNANCY TEST: CPT

## 2022-07-07 PROCEDURE — 76857 US EXAM PELVIC LIMITED: CPT

## 2022-07-07 PROCEDURE — 87210 SMEAR WET MOUNT SALINE/INK: CPT

## 2022-07-07 PROCEDURE — 99285 EMERGENCY DEPT VISIT HI MDM: CPT

## 2022-07-07 PROCEDURE — 6360000002 HC RX W HCPCS: Performed by: EMERGENCY MEDICINE

## 2022-07-07 PROCEDURE — 87491 CHLMYD TRACH DNA AMP PROBE: CPT

## 2022-07-07 PROCEDURE — 96361 HYDRATE IV INFUSION ADD-ON: CPT

## 2022-07-07 PROCEDURE — 81003 URINALYSIS AUTO W/O SCOPE: CPT

## 2022-07-07 PROCEDURE — 96375 TX/PRO/DX INJ NEW DRUG ADDON: CPT

## 2022-07-07 RX ORDER — MORPHINE SULFATE 4 MG/ML
4 INJECTION INTRAVENOUS
Status: COMPLETED | OUTPATIENT
Start: 2022-07-07 | End: 2022-07-07

## 2022-07-07 RX ORDER — ONDANSETRON 2 MG/ML
4 INJECTION INTRAMUSCULAR; INTRAVENOUS ONCE
Status: COMPLETED | OUTPATIENT
Start: 2022-07-07 | End: 2022-07-07

## 2022-07-07 RX ORDER — 0.9 % SODIUM CHLORIDE 0.9 %
100 INTRAVENOUS SOLUTION INTRAVENOUS ONCE
Status: COMPLETED | OUTPATIENT
Start: 2022-07-07 | End: 2022-07-07

## 2022-07-07 RX ORDER — CEPHALEXIN 500 MG/1
500 CAPSULE ORAL 3 TIMES DAILY
Qty: 21 CAPSULE | Refills: 0 | Status: SHIPPED | OUTPATIENT
Start: 2022-07-07 | End: 2022-07-14

## 2022-07-07 RX ORDER — OXYCODONE HYDROCHLORIDE 5 MG/1
5 TABLET ORAL EVERY 6 HOURS PRN
Qty: 10 TABLET | Refills: 0 | Status: SHIPPED | OUTPATIENT
Start: 2022-07-07 | End: 2022-07-12

## 2022-07-07 RX ORDER — DOXYCYCLINE 100 MG/1
100 CAPSULE ORAL 2 TIMES DAILY
Qty: 20 CAPSULE | Refills: 0 | Status: SHIPPED | OUTPATIENT
Start: 2022-07-07 | End: 2022-07-17

## 2022-07-07 RX ORDER — METRONIDAZOLE 500 MG/1
500 TABLET ORAL 2 TIMES DAILY
Qty: 14 TABLET | Refills: 0 | Status: SHIPPED | OUTPATIENT
Start: 2022-07-07 | End: 2022-07-14

## 2022-07-07 RX ADMIN — CEFTRIAXONE 1000 MG: 1 INJECTION, POWDER, FOR SOLUTION INTRAMUSCULAR; INTRAVENOUS at 15:49

## 2022-07-07 RX ADMIN — SODIUM CHLORIDE 100 ML: 9 INJECTION, SOLUTION INTRAVENOUS at 13:23

## 2022-07-07 RX ADMIN — ONDANSETRON 4 MG: 2 INJECTION INTRAMUSCULAR; INTRAVENOUS at 15:16

## 2022-07-07 RX ADMIN — IOPAMIDOL 100 ML: 755 INJECTION, SOLUTION INTRAVENOUS at 13:23

## 2022-07-07 RX ADMIN — DIATRIZOATE MEGLUMINE AND DIATRIZOATE SODIUM 30 ML: 660; 100 LIQUID ORAL; RECTAL at 12:23

## 2022-07-07 RX ADMIN — MORPHINE SULFATE 4 MG: 4 INJECTION INTRAVENOUS at 15:16

## 2022-07-07 ASSESSMENT — PAIN DESCRIPTION - PAIN TYPE: TYPE: ACUTE PAIN

## 2022-07-07 ASSESSMENT — PAIN DESCRIPTION - LOCATION
LOCATION: ABDOMEN
LOCATION: PELVIS
LOCATION: ABDOMEN

## 2022-07-07 ASSESSMENT — PAIN SCALES - GENERAL
PAINLEVEL_OUTOF10: 9
PAINLEVEL_OUTOF10: 9
PAINLEVEL_OUTOF10: 7
PAINLEVEL_OUTOF10: 8
PAINLEVEL_OUTOF10: 9

## 2022-07-07 ASSESSMENT — ENCOUNTER SYMPTOMS
SORE THROAT: 0
SHORTNESS OF BREATH: 0
ABDOMINAL PAIN: 1
NAUSEA: 0
BELCHING: 0

## 2022-07-07 ASSESSMENT — PAIN DESCRIPTION - ORIENTATION
ORIENTATION: LEFT
ORIENTATION: LEFT;LOWER
ORIENTATION: LEFT
ORIENTATION: LEFT;LOWER

## 2022-07-07 ASSESSMENT — PAIN - FUNCTIONAL ASSESSMENT
PAIN_FUNCTIONAL_ASSESSMENT: 0-10
PAIN_FUNCTIONAL_ASSESSMENT: 0-10

## 2022-07-07 ASSESSMENT — PAIN DESCRIPTION - DESCRIPTORS
DESCRIPTORS: SPASM;TIGHTNESS
DESCRIPTORS: SQUEEZING

## 2022-07-07 ASSESSMENT — PAIN DESCRIPTION - FREQUENCY: FREQUENCY: CONTINUOUS

## 2022-07-07 NOTE — ED NOTES
5:28 PM discussed results with patient, ultrasound showing salpingotomy's on the left side as well as a UTI. She states she has had UTIs in the past without any symptoms. Discussed need for antibiotics and close outpatient follow-up with her primary doctor. She will be self-swabbed here and treated accordingly.      Nawaf Dinh MD  07/07/22 8858

## 2022-07-07 NOTE — ED NOTES
I have reviewed discharge instructions with the patient. The patient verbalized understanding. Patient left ED via Discharge Method: ambulatory to Home with family. Opportunity for questions and clarification provided. Patient given 4 scripts. To continue your aftercare when you leave the hospital, you may receive an automated call from our care team to check in on how you are doing. This is a free service and part of our promise to provide the best care and service to meet your aftercare needs.  If you have questions, or wish to unsubscribe from this service please call [phone]. Thank you for Choosing our [facility] Emergency Department.         Massiel Mathias RN  07/07/22 [id number]

## 2022-07-07 NOTE — ED PROVIDER NOTES
Nothing  Worsened by:  Nothing  Ineffective treatments:  None tried  Associated symptoms: no anorexia, no belching, no dysuria, no fatigue, no hematuria, no melena, no nausea, no shortness of breath and no sore throat        All other systems reviewed and are negative. Review of Systems   Constitutional: Negative for fatigue. HENT: Negative for sore throat. Respiratory: Negative for shortness of breath. Gastrointestinal: Positive for abdominal pain. Negative for anorexia, melena and nausea. Genitourinary: Negative for dysuria and hematuria. All other systems reviewed and are negative.       Past Medical History:   Diagnosis Date    Abnormal Papanicolaou smear of cervix     12/2016 (postpartum) ASCUS, neg HPV     C. difficile diarrhea 03/2021    COVID-19 vaccine series completed 05/13/2021    Bijal Han Encounter for insertion of mirena IUD 02/07/2017    GERD (gastroesophageal reflux disease)     Headache     Hypertension     Pancreatitis     PUD (peptic ulcer disease)     Trichomonas vaginitis 02/2020    pap w/ + trich        Past Surgical History:   Procedure Laterality Date    CHOLECYSTECTOMY  07/30/2021    Laparoscopic Cholecystectomy        Family History   Problem Relation Age of Onset    Ovarian Cancer Neg Hx     Colon Cancer Neg Hx     Breast Cancer Neg Hx     Headache Neg Hx     Bleeding Prob Neg Hx     Rheum Arthritis Neg Hx     Alcohol Abuse Neg Hx     Diabetes Maternal Aunt     Diabetes Maternal Uncle     Hypertension Maternal Grandfather     Hypertension Maternal Grandmother     Diabetes Maternal Grandmother     Lung Disease Maternal Grandmother     Thyroid Disease Mother     Migraines Mother     Hypertension Mother     Prostate Cancer Neg Hx            Social Connections:     Frequency of Communication with Friends and Family: Not on file    Frequency of Social Gatherings with Friends and Family: Not on file    Attends Shinto Services: Not on file   Robert Duke Active Member of Clubs or Organizations: Not on file    Attends Club or Organization Meetings: Not on file    Marital Status: Not on file        No Known Allergies     Vitals signs and nursing note reviewed. Patient Vitals for the past 4 hrs:   Temp Pulse Resp BP SpO2   07/07/22 1159 99 °F (37.2 °C) (!) 102 18 (!) 145/77 98 %          Physical Exam  Vitals and nursing note reviewed. Constitutional:       Appearance: Normal appearance. HENT:      Head: Normocephalic and atraumatic. Nose: Nose normal.      Mouth/Throat:      Mouth: Mucous membranes are dry. Pharynx: Oropharynx is clear. Eyes:      Extraocular Movements: Extraocular movements intact. Conjunctiva/sclera: Conjunctivae normal.      Pupils: Pupils are equal, round, and reactive to light. Cardiovascular:      Rate and Rhythm: Normal rate. Pulses: Normal pulses. Pulmonary:      Effort: Pulmonary effort is normal.   Abdominal:      General: Abdomen is flat. Bowel sounds are normal.      Palpations: Abdomen is soft. Tenderness: There is abdominal tenderness (LLQ ttp). There is no guarding or rebound. Musculoskeletal:         General: Normal range of motion. Cervical back: Normal range of motion and neck supple. Skin:     General: Skin is warm and dry. Capillary Refill: Capillary refill takes less than 2 seconds. Neurological:      General: No focal deficit present. Mental Status: She is alert and oriented to person, place, and time. Mental status is at baseline. Psychiatric:         Mood and Affect: Mood normal.         Behavior: Behavior normal.         Thought Content:  Thought content normal.         Judgment: Judgment normal.          Procedures        Labs Reviewed   URINALYSIS   PREGNANCY, URINE   CBC WITH AUTO DIFFERENTIAL   COMPREHENSIVE METABOLIC PANEL   LIPASE        CT ABDOMEN PELVIS W IV CONTRAST Additional Contrast? Oral    (Results Pending)                            Voice dictation software was used during the making of this note. This software is not perfect and grammatical and other typographical errors may be present. This note has not been completely proofread for errors.      Moreno Aiken MD  07/07/22 [id number]

## 2022-07-07 NOTE — ED TRIAGE NOTES
Pt presents to the ED with c/o pelvic pain on the left side. Pt states this has been going on for the last 4 days. Pt denies any other symptoms. No fevers at home. Pt states she has not taken anything at home for the pain as she can no longer take tylenol or ibuprofen d/t her liver. Masked.

## 2022-07-11 LAB
C TRACH RRNA SPEC QL NAA+PROBE: NEGATIVE
N GONORRHOEA RRNA SPEC QL NAA+PROBE: NEGATIVE
SPECIMEN SOURCE: NORMAL

## 2022-07-26 ENCOUNTER — OFFICE VISIT (OUTPATIENT)
Dept: OBGYN CLINIC | Age: 32
End: 2022-07-26
Payer: COMMERCIAL

## 2022-07-26 VITALS
SYSTOLIC BLOOD PRESSURE: 118 MMHG | WEIGHT: 183 LBS | HEIGHT: 64 IN | BODY MASS INDEX: 31.24 KG/M2 | DIASTOLIC BLOOD PRESSURE: 78 MMHG

## 2022-07-26 DIAGNOSIS — B37.31 YEAST VAGINITIS: ICD-10-CM

## 2022-07-26 DIAGNOSIS — N70.91 SALPINGITIS: Primary | ICD-10-CM

## 2022-07-26 DIAGNOSIS — Z97.5 IUD (INTRAUTERINE DEVICE) IN PLACE: ICD-10-CM

## 2022-07-26 PROCEDURE — G8427 DOCREV CUR MEDS BY ELIG CLIN: HCPCS | Performed by: OBSTETRICS & GYNECOLOGY

## 2022-07-26 PROCEDURE — G8417 CALC BMI ABV UP PARAM F/U: HCPCS | Performed by: OBSTETRICS & GYNECOLOGY

## 2022-07-26 PROCEDURE — 99214 OFFICE O/P EST MOD 30 MIN: CPT | Performed by: OBSTETRICS & GYNECOLOGY

## 2022-07-26 PROCEDURE — 1036F TOBACCO NON-USER: CPT | Performed by: OBSTETRICS & GYNECOLOGY

## 2022-07-26 RX ORDER — AMITRIPTYLINE HYDROCHLORIDE 25 MG/1
25 TABLET, FILM COATED ORAL NIGHTLY
COMMUNITY
Start: 2022-06-27

## 2022-07-26 RX ORDER — BUPROPION HYDROCHLORIDE 150 MG/1
TABLET ORAL
COMMUNITY
Start: 2022-07-25

## 2022-07-26 RX ORDER — GABAPENTIN 300 MG/1
300 CAPSULE ORAL 3 TIMES DAILY
COMMUNITY

## 2022-07-27 NOTE — PROGRESS NOTES
Agueda Batres  31 y.o.  1990  [id number]    Today:  7/26/2022    Chief Complaint   Patient presents with    Follow-Up from Hospital     UTI, infection and  fluid around left fallopian tube       Subjective:  Agueda Batres is a 32 y.o. [id number], severe LLQ pelvic pain/couldn't walk. Pain is  always left-sided--> [facility], rxd:  Rocephin, Keflex, Doxycycline and Flagyl. FU 7/7/2022 [facility] visit--> h/o IBS, chronic pancreatitis, GERD, PUD, s/p cholecystectomy c/o 4 d constant LLQ pain  Denies:  bloody stools, h/o kidney stones, afeb    Has not been sexually active for ~ 2+ y, ER wondered if removing her IUD would help her symptoms. Pancreatic symptoms started 2/2022  Mirena IUD expires 2/7/2024. Patient wants Mirena IUD removed. Prior to Mirena IUD-->medium-heavy menstrual flow with clots, no significant pain     HPI:   7/7/2022-->[facility], dxd w/ UTI and left salpingitis. Per pt, US--> free fluid in her pelvis     5/13/22 [facility] CT ABDOMEN PELVIS WITH IV CONTRAST ONLY    COMPARISON: CT abdomen pelvis March 14, 2022     FINDINGS:   Lung Bases: Unremarkable. Liver: Diffuse decreased attenuation of the liver parenchyma compatible with steatosis. No surface contour nodularity or suspicious lesion. The portal vein is patent. Gallbladder/Biliary: The gallbladder is surgically absent. No biliary ductal dilatation. Pancreas: Edematous changes of the distal body and tail with mild loss of the normal fat planes and associated peripancreatic inflammatory stranding. Additional similar findings at the pancreatic head with mild stranding about the duodenal C-sweep. No   main ductal dilatation. No loculated drainable fluid collection. Spleen: Unremarkable. Adrenal glands: Unremarkable. Kidneys/Ureters: Symmetric bilateral enhancement. No hydronephrosis or suspicious lesion. Bladder: Partially distended bladder is unremarkable. Reproduction:IUD in place.   No adnexal lesions or fluid. Gastrointestinal: No obstruction. Mild reactive stranding about the greater curvature stomach related to patient's pancreatitis. Vasculature: Abdominal aorta and its major branch vessels are normal caliber. Musculoskeletal: No acute or aggressive osseous abnormalities. The soft tissues are unremarkable. IMPRESSION   IMPRESSION:   1. Acute noncomplicated interstitial pancreatitis centered predominantly about the pancreatic tail. 2.  Cardiomegaly with hepatic steatosis. 5/13/2022 [facility] MRI Abdomen Without Contrast:     Clinical History: Nausea and diarrhea for 2 years. Multiple episodes of pancreatitis in the past 3 months. Comparison: CT abdomen and pelvis with contrast 5/13/2022   Findings:   Evaluation of the lung bases is limited by MRI imaging. No obvious pulmonary abnormalities are seen. No pleural effusion is seen. The gallbladder is not seen suggesting prior resection. MRCP imaging is limited by patient breathing motion artifact. No intrahepatic biliary ductal dilatation is seen. The extrahepatic bile ducts measure up to 4 mm in diameter which is nondilated. No clear filling defect is seen to suggest choledocholithiasis or obstructing abnormality otherwise. No abnormal abrupt caliber changes are seen of the biliary tree. The pancreatic duct is suboptimally visualized due to patient breathing motion an acute inflammatory changes. This is not well seen on MRCP imaging and felt to be best visualized on fat-saturated T2-weighted images. On image 19 of this series (series 4) there is a component of the main pancreatic duct extending towards the distal common   bile duct and therefore pancreas divisum is not suspected. Evaluation of the solid organs is limited without intravenous contrast. The liver is homogeneous in it's appearance. Severe hepatic signal loss is seen on out of phase T1-weighted images suggesting severe fatty infiltration.   The BOWEL: The visualized bowel is unremarkable. PERITONEUM/RETROPERITONEUM: No ascites. No lymphadenopathy. VESSELS: No abdominal aortic aneurysm. Normal enhancement within the portal venous system. BONES: Normal bone marrow signal.   IMPRESSION:   1. Resolution of the previously visualized acute pancreatitis. No peripancreatic collection. No pancreatic mass or ductal dilation is evident. 2.  Diffuse hepatic steatosis. 6/2022, [facility]: Normal EGD, Dr. Gael Roberts  6/2022, [facility]:  Colonoscopy Dr. Gael Roberts:    Findings: The entire examined colon appeared normal. Biopsies for histology were taken with a cold forceps   for evaluation of microscopic colitis. 7/2022:  CT ABD/PELVIS w/ iv contrast  Findings:     Lung Bases: Lung bases are clear. No pleural effusion. Heart size is normal without pericardial effusion. Liver and biliary system: The liver is hypodense without focal lesion. No intra or extrahepatic biliary ductal dilatation is noted. The gallbladder is absent. Pancreas: Peripancreatic edema noted. Spleen: The spleen is normal.     Adrenals: The adrenal glands are within normal limits. Kidneys: Subcapsular small volume fluid collection seen adjacent to the left kidney. There is mild mass effect on the upper pole cortex. No stones or hydronephrosis. Gastrointestinal: The stomach and scattered loops of small and large bowel have a nonobstructive pattern. No focal mucosal lesion or inflammatory changes are seen. The appendix is normal in the right lower quadrant. Mesentery and retroperitoneum: No mesenteric or retroperitoneal adenopathy. No abnormal fluid collection, mass or free air. Pelvis: The urinary bladder is moderately distended with a smooth contour. Rectum is normal. No free fluid. No deep pelvic or inguinal adenopathy. Reproductive: No acute abnormality. IUD. Body wall: Normal.     Bones: No acute osseous abnormality.      IMPRESSION   Impression: 1. Small volume subcapsular perinephric fluid collection with mild mass effect on the upper pole renal cortex. Follow-up renal ultrasound may be considered for monitoring if there is concern for development of Page kidney. 2. Peripancreatic edema suspicious for acute pancreatitis. Correlate with lipase level. 3. Fatty liver. 2022 SF, Pelvic US    PELVIC ULTRASOUND. INDICATION:  Pelvic pain. Abnormal CAT scan. Color flow and Doppler of the ovaries performed. FINDINGS: Uterus: measures 9.6 cm x 4.4 cm x 4. cm. The endometrial echo stripe is normal thickness, 6-7 mm. Echogenic IUD is present. Right ovary: 3.4 x 2.4 x 2.2 cm. There is blood flow documented in the right ovary. Left ovary: 3.3 x 2.5 x 2.6 cm. There is blood flow documented in the left ovary. Complex septated low-attenuation focus in the left adnexa appears hyperemic, possibly inflamed fallopian tube. Negative for ovarian torsion. Findings as above suggesting possible left salpingitis. 2022 RENAL  US:  RIGHT KIDNEY:   Size: 11.4 cm   Cortex: Normal renal cortical echogenicity. Collecting system: No evidence of hydronephrosis. LEFT KIDNEY:   Size: 11.2 cm   Cortex: Normal renal cortical echogenicity. 1.1 cm hyperechoic lesion. Collecting system: No evidence of hydronephrosis. URINARY BLADDER:   Unremarkable. Partially imaged liver is diffusely echogenic. Free fluid again seen about the left kidney. IMPRESSION:     1. Free fluid again seen about the left kidney. 2.  Echogenic liver compatible with steatosis and/or fibrosis. 3.  1.1 cm hyperechoic lesion about the left kidney. No definite correlate on comparison CT or MRI. This could be a tiny angiomyolipoma. No LMP recorded (lmp unknown). (Menstrual status: IUD).       OB History    Para Term  AB Living   4 0 2 1 1 3   SAB IAB Ectopic Molar Multiple Live Births   0 0 0 0 0 0       No Known Allergies    Current Outpatient Medications   Medication Sig    amitriptyline (ELAVIL) 25 MG tablet Take 25 mg by mouth nightly    buPROPion (WELLBUTRIN XL) 150 MG extended release tablet     gabapentin (NEURONTIN) 300 MG capsule Take 300 mg by mouth in the morning and 300 mg at noon and 300 mg before bedtime. vitamin D 25 MCG (1000 UT) CAPS Take by mouth daily    levonorgestrel (MIRENA) IUD 52 mg 1 Device by IntraUTERine route once    metoprolol tartrate (LOPRESSOR) 25 MG tablet Take 25 mg by mouth 2 times daily    ondansetron (ZOFRAN) 8 MG tablet Take 8 mg by mouth every 8 hours as needed    pantoprazole (PROTONIX) 40 MG tablet Take 40 mg by mouth daily     No current facility-administered medications for this visit.        Past Medical History:   Diagnosis Date    Abnormal Papanicolaou smear of cervix     12/2016 (postpartum) ASCUS, neg HPV     C. difficile diarrhea 03/2021    COVID-19 vaccine series completed 05/13/2021    Moderna    Encounter for insertion of mirena IUD 02/07/2017    GERD (gastroesophageal reflux disease)     Headache     Hypertension     Pancreatitis     PUD (peptic ulcer disease)     Trichomonas vaginitis 02/2020    pap w/ + trich       Past Surgical History:   Procedure Laterality Date    CHOLECYSTECTOMY  07/30/2021    Laparoscopic Cholecystectomy       Social History     Socioeconomic History    Marital status: Single     Spouse name: None    Number of children: None    Years of education: None    Highest education level: None   Tobacco Use    Smoking status: Never    Smokeless tobacco: Never   Substance and Sexual Activity    Alcohol use: Not Currently     Alcohol/week: 0.0 standard drinks    Drug use: Never       Family History   Problem Relation Age of Onset    Ovarian Cancer Neg Hx     Colon Cancer Neg Hx     Breast Cancer Neg Hx     Headache Neg Hx     Bleeding Prob Neg Hx     Rheum Arthritis Neg Hx     Alcohol Abuse Neg Hx     Diabetes Maternal Aunt     Diabetes Maternal Uncle     Hypertension Maternal Grandfather Hypertension Maternal Grandmother     Diabetes Maternal Grandmother     Lung Disease Maternal Grandmother     Thyroid Disease Mother     Migraines Mother     Hypertension Mother     Prostate Cancer Neg Hx          Objective: /78   Ht 5' 4\" (1.626 m)   Wt 183 lb (83 kg)   LMP  (LMP Unknown)   BMI 31.41 kg/m²   Reina Messina is a 32 y.o. female who appears pleasant, well developed, well nourished, with good attention to hygiene and body habitus. In no acute distress. Psych:  Oriented to person, place and time. Mood and affect are normal and appropriate to the situation. Short-term memory and understanding intact    Eyes: Sclera are clear. Conjunctiva and lids within normal limits. No icterus. Ears and Nose: no gross deformities to visual inspection, gross hearing intact   Lymph Nodes:  No groin lymphadenopathy noted. Skin: No skin rash, subcutaneous nodules, lesions or ulcers observed  Respiratory:   Breathing is non-labored. Lungs clear to auscultation without wheezing or rhonchi   Cardiovascular: RRR, Heart auscultation reveals no murmurs, rubs or gallops appreciated. Abdomen: Soft. No masses, patient reports variable mild discomfort, most pronounced in the LLQ, no guarding or rebound   External genitalia: normal appearing, minimal erythema, no lesions  Vagina: pink with normal rugations, no lesions, white discharge/possible yeast-->  NuSwab & upper vaginal/cervical cx for aerobes and anaerobes  Cervix: normal appearing, no lesions,  no exudate, IUD string seen, equivocal CMT  Uterus: midline, normal size, shape and contour  Adnexa: bilaterally  no masses, non tender  Rectovaginal exam:  Normal sphincter tone, no masses     Procedure: The consent was signed and dated. Time out was done to confirm procedure. A bivalve speculum was placed into the vagina. Cervix swabbed with Betadine, IUD string grasped with a [place] forcep and IUD was easily removed intact.   Pt was shown the IUD

## 2022-07-30 PROBLEM — Z97.5 IUD (INTRAUTERINE DEVICE) IN PLACE: Status: ACTIVE | Noted: 2017-02-07

## 2022-07-30 LAB
BACTERIA SPEC CULT: ABNORMAL
BACTERIA SPEC CULT: ABNORMAL
M GENITALIUM DNA SPEC QL NAA+PROBE: NEGATIVE
M HOMINIS DNA SPEC QL NAA+PROBE: NEGATIVE
SERVICE CMNT-IMP: ABNORMAL
UREAPLASMA DNA SPEC QL NAA+PROBE: NEGATIVE

## 2022-08-01 ENCOUNTER — TELEPHONE (OUTPATIENT)
Dept: OBGYN CLINIC | Age: 32
End: 2022-08-01

## 2022-08-01 NOTE — RESULT ENCOUNTER NOTE
Roslyn,    The culture taken at your last visit shows yeast infection. Dr Dayana Schulz sent in a prescription for terazol cream.    If you have any questions, please let us know.     Thank you    Cynthia Ahmadi CMA

## 2022-08-01 NOTE — TELEPHONE ENCOUNTER
----- Message from Juanito Clark MD sent at 7/30/2022 12:57 PM EDT -----  Patient needs follow-up pelvic ultrasound ~2-3 months not sure why it was scheduled in 2 weeks. Please cancel the 8/15/2022 pelvic US, reschedule for ~10/2022, thanks.

## 2022-08-04 LAB
A VAGINAE DNA VAG QL NAA+PROBE: ABNORMAL SCORE
BVAB2 DNA VAG QL NAA+PROBE: ABNORMAL SCORE
C ALBICANS DNA VAG QL NAA+PROBE: POSITIVE
C GLABRATA DNA VAG QL NAA+PROBE: NEGATIVE
C TRACH RRNA SPEC QL NAA+PROBE: NEGATIVE
CANDIDA KRUSEI: NEGATIVE
CANDIDA LUSITANIAE, NAA, 180015: NEGATIVE
CANDIDA PARAPSILOSIS/TROPICALIS: NEGATIVE
MEGA1 DNA VAG QL NAA+PROBE: ABNORMAL SCORE
N GONORRHOEA RRNA SPEC QL NAA+PROBE: NEGATIVE
T VAGINALIS RRNA SPEC QL NAA+PROBE: NEGATIVE

## 2022-10-17 ENCOUNTER — OFFICE VISIT (OUTPATIENT)
Dept: OBGYN CLINIC | Age: 32
End: 2022-10-17
Payer: COMMERCIAL

## 2022-10-17 VITALS — DIASTOLIC BLOOD PRESSURE: 62 MMHG | WEIGHT: 192.2 LBS | BODY MASS INDEX: 32.99 KG/M2 | SYSTOLIC BLOOD PRESSURE: 124 MMHG

## 2022-10-17 DIAGNOSIS — N70.91 SALPINGITIS: Primary | ICD-10-CM

## 2022-10-17 DIAGNOSIS — R10.32 LLQ PAIN: ICD-10-CM

## 2022-10-17 PROCEDURE — G8427 DOCREV CUR MEDS BY ELIG CLIN: HCPCS | Performed by: OBSTETRICS & GYNECOLOGY

## 2022-10-17 PROCEDURE — G8484 FLU IMMUNIZE NO ADMIN: HCPCS | Performed by: OBSTETRICS & GYNECOLOGY

## 2022-10-17 PROCEDURE — 76830 TRANSVAGINAL US NON-OB: CPT | Performed by: OBSTETRICS & GYNECOLOGY

## 2022-10-17 PROCEDURE — G8417 CALC BMI ABV UP PARAM F/U: HCPCS | Performed by: OBSTETRICS & GYNECOLOGY

## 2022-10-17 PROCEDURE — 99214 OFFICE O/P EST MOD 30 MIN: CPT | Performed by: OBSTETRICS & GYNECOLOGY

## 2022-10-17 PROCEDURE — 1036F TOBACCO NON-USER: CPT | Performed by: OBSTETRICS & GYNECOLOGY

## 2022-10-17 RX ORDER — PANCRELIPASE 36000; 180000; 114000 [USP'U]/1; [USP'U]/1; [USP'U]/1
CAPSULE, DELAYED RELEASE PELLETS ORAL
COMMUNITY
Start: 2022-10-04

## 2022-10-17 NOTE — PROGRESS NOTES
Follow up visit    Chandu Angeles   28 y.o.  1990  [id number]    Today:  10/17/2022    Chief Complaint   Patient presents with    Follow-up     U/S follow up  and results       HPI:    States she is doing well, no pelvic pain, childbearing is complete. Doing pelvic floor PT 2x/wk x 6 wk. LMP 9/10/22, monthly menses last 5 d. No significant cramping and normal flow, uses 4-5 pads/d max. Last sex ~ 9/18/2022, no dyspareunia    Seeing pain management 2x/wk    BC:  condoms     7/26/2022 ov: Follow-Up from Hospital        UTI, infection and  fluid around left fallopian tube   Subjective:  Chandu Angeles is a 32 y.o. [id number], severe LLQ pelvic pain/couldn't walk. Pain is  always left-sided--> [facility], rxd:  Rocephin, Keflex, Doxycycline and Flagyl. FU 7/7/2022 [facility] visit--> h/o IBS, chronic pancreatitis, GERD, PUD, s/p cholecystectomy c/o 4 d constant LLQ pain  Denies:  bloody stools, h/o kidney stones, afeb     Has not been sexually active for ~ 2+ y, ER wondered if removing her IUD would help her symptoms. Pancreatic symptoms started 2/2022  Mirena IUD expires 2/7/2024. Patient wants Mirena IUD removed. Prior to Mirena IUD-->medium-heavy menstrual flow with clots, no significant pain     HPI:   7/7/2022-->[facility], dxd w/ UTI and left salpingitis. Per pt, US--> free fluid in her pelvis     A/P:  1.  32 y.o. [id number], FU 7/2022 [facility] visit-->4 d severe, constant, always LLQ pelvic pain/couldn't walk, --> Txd w/:  Rocephin, Keflex, Doxycycline and Flagyl. H/o IBS, chronic pancreatitis, GERD, PUD, s/p cholecystectomy c/o 4 d constant LLQ pain  Denies:  bloody stools, h/o kidney stones, afeb     Abstinent~ 2+ y, ER wondered if removing her IUD would help her symptoms. Pancreatic symptoms started 2/2022. Mirena IUD inserted 2017  Mirena IUD expires 2/7/2024. Patient wants Mirena IUD removed (abstinent ~2 years)-->IUD removed today.      Prior to Rezat IUD-->medium-heavy menstrual flow with clots, no significant pain  Mirena IUD removed today. Pt wonders if the complex LLQ adnexa changes could be contributing to her sx. D/w pt--> complex left adnexal findings often resolve spontaneously. 7/2022, [facility]--> + UDS  and opiates, possibly given in the ER, can't find prescriptions for these medications on her chart. [facility] [place] [facility], [facility], [facility] [place] [facility] ov 9/2022  F/u 3m-assess with repeat pelvic US/as needed      Synopsis:  5/13/22 [facility] CT ABDOMEN PELVIS WITH IV CONTRAST ONLY    COMPARISON: CT abdomen pelvis March 14, 2022     FINDINGS:   Lung Bases: Unremarkable. Liver: Diffuse decreased attenuation of the liver parenchyma compatible with steatosis. No surface contour nodularity or suspicious lesion. The portal vein is patent. Gallbladder/Biliary: The gallbladder is surgically absent. No biliary ductal dilatation. Pancreas: Edematous changes of the distal body and tail with mild loss of the normal fat planes and associated peripancreatic inflammatory stranding. Additional similar findings at the pancreatic head with mild stranding about the duodenal C-sweep. No main ductal dilatation. No loculated drainable fluid collection. Spleen: Unremarkable. Adrenal glands: Unremarkable. Kidneys/Ureters: Symmetric bilateral enhancement. No hydronephrosis or suspicious lesion. Bladder: Partially distended bladder is unremarkable. Reproduction:  IUD in place. No adnexal lesions or fluid. Gastrointestinal: No obstruction. Mild reactive stranding about the greater curvature stomach related to patient's pancreatitis. Vasculature: Abdominal aorta and its major branch vessels are normal caliber. Musculoskeletal: No acute or aggressive osseous abnormalities. The soft tissues are unremarkable. IMPRESSION:   1.    Acute noncomplicated interstitial pancreatitis centered predominantly about the pancreatic tail. 2.  Cardiomegaly with hepatic steatosis. 5/13/2022 [facility] MRI Abdomen Without Contrast:   Clinical History: Nausea and diarrhea for 2 years. Multiple episodes of pancreatitis in the past 3 months. Comparison: CT abdomen and pelvis with contrast 5/13/2022   Findings:   Evaluation of the lung bases is limited by MRI imaging. No obvious pulmonary abnormalities are seen. No pleural effusion is seen. The gallbladder is not seen suggesting prior resection. MRCP imaging is limited by patient breathing motion artifact. No intrahepatic biliary ductal dilatation is seen. The extrahepatic bile ducts measure up to 4 mm in diameter which is nondilated. No clear filling defect is seen to suggest choledocholithiasis or obstructing abnormality otherwise. No abnormal abrupt caliber changes are seen of the biliary tree. The pancreatic duct is suboptimally visualized due to patient breathing motion an acute inflammatory changes. This is not well seen on MRCP imaging and felt to be best visualized on fat-saturated T2-weighted images. On image 19 of this series (series 4) there is a component of the main pancreatic duct extending towards the distal common   bile duct and therefore pancreas divisum is not suspected. Evaluation of the solid organs is limited without intravenous contrast. The liver is homogeneous in it's appearance. Severe hepatic signal loss is seen on out of phase T1-weighted images suggesting severe fatty infiltration. The spleen is homogeneous   in it's appearance. No contour deforming lesion is seen of the adrenal glands. Pancreatic parenchymal assessment is limited on this noncontrast study given the acute inflammatory changes. No clear contour deforming or defined fat replacing lesion is   seen. No evidence of hydronephrosis is seen. No contour deforming abnormality is seen of the kidneys. The loops of bowel are normal in caliber.  Persistent mesenteric edema is seen about the pancreatic tail. Persistent small fluid collections are seen most evident along the greater curvature of the liver and along the left anterior pararenal fascial   plane consistent with acute pancreatitis which are not clearly changed. IMPRESSION:     1. Acute inflammatory changes from acute pancreatitis which are not clearly changed from the prior CT study. 2.  Limited assessment in this setting of acute pancreatitis and due to patient breathing motion artifact. No abnormal pancreatic duct dilation is seen. No clear evidence for pancreas divisum is seen. 3.  Severe fatty infiltration of the liver. 5/25/22 [address] [place] [address] MRI ABDOMEN WITH AND WITHOUT CONTRAST MRCP      COMPARISON: Abdomen MRI dated 5/13/2022, CT abdomen pelvis dated 5/13/2022   INDICATION: Chronic pancreatitis, follow-up     FINDINGS:   LUNG BASES: The lung bases are clear. LIVER:  The liver is enlarged, measuring up to 22 cm in craniocaudal dimension. Diffuse loss of hepatic signal on out of phase imaging, compatible with hepatic steatosis. There is relatively increased signal loss within the right hepatic lobe. BILIARY TREE: The gallbladder is surgically absent. No biliary dilation. SPLEEN: Normal.   PANCREAS: No pancreatic mass or ductal dilation. Previously visualized peripancreatic fluid and fat stranding has resolved. No peripancreatic collection. ADRENALS: Normal.   KIDNEYS: The kidneys are symmetric in size. No renal calculus or hydronephrosis. No renal mass. BOWEL: The visualized bowel is unremarkable. PERITONEUM/RETROPERITONEUM: No ascites. No lymphadenopathy. VESSELS: No abdominal aortic aneurysm. Normal enhancement within the portal venous system. BONES: Normal bone marrow signal.   IMPRESSION:   1. Resolution of the previously visualized acute pancreatitis. No peripancreatic collection. No pancreatic mass or ductal dilation is evident. 2.  Diffuse hepatic steatosis.       6/2022, [facility]: Normal EGD, Dr. Milton Mcclure  6/2022, [facility]:  Colonoscopy Dr. Milton Mcclure:    Findings: The entire examined colon appeared normal. Biopsies for histology were taken with a cold forceps   for evaluation of microscopic colitis. 7/2022:  CT ABD/PELVIS w/ iv contrast  Findings:   Lung Bases: Lung bases are clear. No pleural effusion. Heart size is normal without pericardial effusion. Liver and biliary system: The liver is hypodense without focal lesion. No intra or extrahepatic biliary ductal dilatation is noted. The gallbladder is absent. Pancreas: Peripancreatic edema noted. Spleen: The spleen is normal.     Adrenals: The adrenal glands are within normal limits. Kidneys: Subcapsular small volume fluid collection seen adjacent to the left kidney. There is mild mass effect on the upper pole cortex. No stones or hydronephrosis. Gastrointestinal: The stomach and scattered loops of small and large bowel have a nonobstructive pattern. No focal mucosal lesion or inflammatory changes are seen. The appendix is normal in the right lower quadrant. Mesentery and retroperitoneum: No mesenteric or retroperitoneal adenopathy. No abnormal fluid collection, mass or free air. Pelvis: The urinary bladder is moderately distended with a smooth contour. Rectum is normal. No free fluid. No deep pelvic or inguinal adenopathy. Reproductive: No acute abnormality. IUD. Body wall: Normal.     Bones: No acute osseous abnormality. IMPRESSION   Impression:   1. Small volume subcapsular perinephric fluid collection with mild mass effect on the upper pole renal cortex. Follow-up renal ultrasound may be considered for monitoring if there is concern for development of Page kidney. 2. Peripancreatic edema suspicious for acute pancreatitis. Correlate with lipase level. 3. Fatty liver. 7/7/2022-->[facility], dxd w/ UTI and left salpingitis.   Per pt, US--> free fluid in her pelvis   7/07/2022 [facility], Pelvic US  PELVIC ULTRASOUND. INDICATION:  Pelvic pain. Abnormal CAT scan. Color flow and Doppler of the ovaries performed. FINDINGS: Uterus: measures 9.6 cm x 4.4 cm x 4. cm. The endometrial echo stripe is normal thickness, 6-7 mm. Echogenic IUD is present. Right ovary: 3.4 x 2.4 x 2.2 cm. There is blood flow documented in the right ovary. Left ovary: 3.3 x 2.5 x 2.6 cm. There is blood flow documented in the left ovary. Complex septated low-attenuation focus in the left adnexa appears hyperemic, possibly inflamed fallopian tube. Negative for ovarian torsion. Findings as above suggesting possible left salpingitis. 2022 RENAL  US:  RIGHT KIDNEY:   Size: 11.4 cm   Cortex: Normal renal cortical echogenicity. Collecting system: No evidence of hydronephrosis. LEFT KIDNEY:   Size: 11.2 cm   Cortex: Normal renal cortical echogenicity. 1.1 cm hyperechoic lesion. Collecting system: No evidence of hydronephrosis. URINARY BLADDER:   Unremarkable. Partially imaged liver is diffusely echogenic. Free fluid again seen about the left kidney. IMPRESSION:     1. Free fluid again seen about the left kidney. 2.  Echogenic liver compatible with steatosis and/(or fibrosis. 3.  1.1 cm hyperechoic lesion about the left kidney. No definite correlate on comparison CT or MRI. This could be a tiny angiomyolipoma. 2022 cbc--> normal WBCs 7, hgb 11, platelets 130I, no left shift. 2022--> IUD removed    10/17/2022 US today:  ANTEVERTED HETEROGENOUS UTERUS,  11/5.5/5, vol 150. SMALL 1 CM FIBOID NOTED ANTERIOR RT UTERUS (<1 cm). NABOTHIAN CYSTS. ENDOMETRIUM -WNL. RT OV- WNL DOMINANT FOLLICLE 1.49 CM. LT OV- APPEARS POLYCYSTIC.   LT ADNEXA- COMPLEX, SEPATED AREA AGAIN NOTED SURROUNDING LT OVARY- ?SALPINGITIS  Images 13:  transverse cervix, R ov: 15, 17,16, left adnexa:  21 & 27    BC:   condoms    OB History          4    Para        Term   2       1    AB   1 Living   3         SAB        IAB        Ectopic        Molar        Multiple        Live Births                  Patient's last menstrual period was 09/10/2022 (exact date). Past Surgical History:   Procedure Laterality Date    CHOLECYSTECTOMY  07/30/2021    Laparoscopic Cholecystectomy     Past Medical History:   Diagnosis Date    Abnormal Papanicolaou smear of cervix     12/2016 (postpartum) ASCUS, neg HPV     C. difficile diarrhea 03/2021    COVID-19 vaccine series completed 05/13/2021    Moderna    Encounter for insertion of mirena IUD 02/07/2017    GERD (gastroesophageal reflux disease)     Headache     Hypertension     Pancreatitis     PUD (peptic ulcer disease)     Trichomonas vaginitis 02/2020    pap w/ + trich     Family History   Problem Relation Age of Onset    Ovarian Cancer Neg Hx     Colon Cancer Neg Hx     Breast Cancer Neg Hx     Headache Neg Hx     Bleeding Prob Neg Hx     Rheum Arthritis Neg Hx     Alcohol Abuse Neg Hx     Diabetes Maternal Aunt     Diabetes Maternal Uncle     Hypertension Maternal Grandfather     Hypertension Maternal Grandmother     Diabetes Maternal Grandmother     Lung Disease Maternal Grandmother     Thyroid Disease Mother     Migraines Mother     Hypertension Mother     Prostate Cancer Neg Hx        Review of Systems    Updated from patient's \"Review of Systems\" form that patient completed. Physical Exam:   /62   Wt 192 lb 3.2 oz (87.2 kg)   LMP 09/10/2022 (Exact Date)   BMI 32.99 kg/m²     Patient is a 28 y.o. female who appears pleasant, in no apparent distress, her given age, well developed, well nourished and with good attention to hygiene and body habitus. Oriented to person, place and time. Mood and affect normal and appropriate to situation. Head is normocephalic, atraumatic, without any gross head or neck masses. PE not repeated    A/P:  1. Follow up:  Per pt dxd w/ exocrine pancreatic insufficiency, taking Creon, able to eat more.    Pelvic US reviewed w/ US tech, left FT appears to be surrounding the left ovary, possible adhesions. Long d/w pt, she is doing much better, denies pelvic pain, leaning toward expectant management if follow up US(s) remain stable/pt asx. Recent severe pancreatic issues resolving, improved w/Creon     2. Follow Up:  4 m w/ follow up US and AE    Orders Placed This Encounter   Procedures    AMB POC US, TRANSVAGINAL        Diagnosis Orders   1. Salpingitis  AMB POC US, TRANSVAGINAL        More than 50% of this 30-35+  minute visit was spent addressing the above patient concerns including:  assessment, chart review and counseling the patient about the above concerns including evaluation plan, treatment strategies & documentation. Long conversation with patient, all her questions answered and addressed all her concerns.     Angel Luis Gutierrez MD, [facility]

## 2022-10-29 PROBLEM — R10.32 LLQ PAIN: Status: ACTIVE | Noted: 2022-07-26

## 2023-02-21 ENCOUNTER — OFFICE VISIT (OUTPATIENT)
Dept: OBGYN CLINIC | Age: 33
End: 2023-02-21
Payer: COMMERCIAL

## 2023-02-21 VITALS
SYSTOLIC BLOOD PRESSURE: 122 MMHG | DIASTOLIC BLOOD PRESSURE: 68 MMHG | HEIGHT: 64 IN | WEIGHT: 183.6 LBS | BODY MASS INDEX: 31.34 KG/M2

## 2023-02-21 DIAGNOSIS — N70.91 SALPINGITIS: Primary | ICD-10-CM

## 2023-02-21 DIAGNOSIS — R10.32 LLQ PAIN: ICD-10-CM

## 2023-02-21 DIAGNOSIS — Z92.89: ICD-10-CM

## 2023-02-21 DIAGNOSIS — Z97.5 IUD (INTRAUTERINE DEVICE) IN PLACE: ICD-10-CM

## 2023-02-21 PROCEDURE — 76830 TRANSVAGINAL US NON-OB: CPT | Performed by: OBSTETRICS & GYNECOLOGY

## 2023-02-21 PROCEDURE — G8484 FLU IMMUNIZE NO ADMIN: HCPCS | Performed by: OBSTETRICS & GYNECOLOGY

## 2023-02-21 PROCEDURE — G8417 CALC BMI ABV UP PARAM F/U: HCPCS | Performed by: OBSTETRICS & GYNECOLOGY

## 2023-02-21 PROCEDURE — 1036F TOBACCO NON-USER: CPT | Performed by: OBSTETRICS & GYNECOLOGY

## 2023-02-21 PROCEDURE — 99213 OFFICE O/P EST LOW 20 MIN: CPT | Performed by: OBSTETRICS & GYNECOLOGY

## 2023-02-21 PROCEDURE — G8427 DOCREV CUR MEDS BY ELIG CLIN: HCPCS | Performed by: OBSTETRICS & GYNECOLOGY

## 2023-02-21 RX ORDER — DULOXETIN HYDROCHLORIDE 60 MG/1
60 CAPSULE, DELAYED RELEASE ORAL EVERY EVENING
COMMUNITY
Start: 2023-02-15

## 2023-02-21 SDOH — ECONOMIC STABILITY: FOOD INSECURITY: WITHIN THE PAST 12 MONTHS, YOU WORRIED THAT YOUR FOOD WOULD RUN OUT BEFORE YOU GOT MONEY TO BUY MORE.: NEVER TRUE

## 2023-02-21 SDOH — ECONOMIC STABILITY: HOUSING INSECURITY
IN THE LAST 12 MONTHS, WAS THERE A TIME WHEN YOU DID NOT HAVE A STEADY PLACE TO SLEEP OR SLEPT IN A SHELTER (INCLUDING NOW)?: NO

## 2023-02-21 SDOH — ECONOMIC STABILITY: INCOME INSECURITY: HOW HARD IS IT FOR YOU TO PAY FOR THE VERY BASICS LIKE FOOD, HOUSING, MEDICAL CARE, AND HEATING?: NOT VERY HARD

## 2023-02-21 SDOH — ECONOMIC STABILITY: FOOD INSECURITY: WITHIN THE PAST 12 MONTHS, THE FOOD YOU BOUGHT JUST DIDN'T LAST AND YOU DIDN'T HAVE MONEY TO GET MORE.: NEVER TRUE

## 2023-02-21 ASSESSMENT — PATIENT HEALTH QUESTIONNAIRE - PHQ9
SUM OF ALL RESPONSES TO PHQ9 QUESTIONS 1 & 2: 0
SUM OF ALL RESPONSES TO PHQ QUESTIONS 1-9: 0
2. FEELING DOWN, DEPRESSED OR HOPELESS: 0
1. LITTLE INTEREST OR PLEASURE IN DOING THINGS: 0

## 2023-02-22 ENCOUNTER — OFFICE VISIT (OUTPATIENT)
Dept: UROLOGY | Age: 33
End: 2023-02-22

## 2023-02-22 DIAGNOSIS — R10.32 LLQ PAIN: ICD-10-CM

## 2023-02-22 DIAGNOSIS — N12 RECURRENT PYELONEPHRITIS: Primary | ICD-10-CM

## 2023-02-22 DIAGNOSIS — N32.81 OAB (OVERACTIVE BLADDER): ICD-10-CM

## 2023-02-22 PROBLEM — Z92.89 H/O PELVIC ULTRASOUND: Status: ACTIVE | Noted: 2023-02-22

## 2023-02-22 LAB
BILIRUBIN, URINE, POC: NEGATIVE
BLOOD URINE, POC: NORMAL
GLUCOSE URINE, POC: NEGATIVE
KETONES, URINE, POC: NEGATIVE
LEUKOCYTE ESTERASE, URINE, POC: NEGATIVE
NITRITE, URINE, POC: NEGATIVE
PH, URINE, POC: 7 (ref 4.6–8)
PROTEIN,URINE, POC: NEGATIVE
SPECIFIC GRAVITY, URINE, POC: 1.01 (ref 1–1.03)
URINALYSIS CLARITY, POC: NORMAL
URINALYSIS COLOR, POC: NORMAL
UROBILINOGEN, POC: NORMAL

## 2023-02-22 RX ORDER — TIZANIDINE 4 MG/1
TABLET ORAL
COMMUNITY
Start: 2023-02-19

## 2023-02-22 ASSESSMENT — ENCOUNTER SYMPTOMS
COUGH: 0
DIARRHEA: 1
BLOOD IN STOOL: 0
EYE DISCHARGE: 0
EYE PAIN: 0
VOMITING: 1
NAUSEA: 1
ABDOMINAL PAIN: 0
CONSTIPATION: 0
HEARTBURN: 0
SKIN LESIONS: 0
INDIGESTION: 0
BACK PAIN: 0
SHORTNESS OF BREATH: 0
WHEEZING: 0

## 2023-02-22 NOTE — PROGRESS NOTES
Follow up visit    Cassy Khan   28 y.o.  1990  [id number]    Today:  23     Chief Complaint   Patient presents with    Ultrasound     Salpingitis      Reports she is doing well, regular menses. States childbearing is complete. 10/17/2022 US today:  ANTEVERTED HETEROGENOUS UTERUS,  ., vol 150. SMALL 1 CM FIBOID NOTED ANTERIOR RT UTERUS (<1 cm). NABOTHIAN CYSTS. ENDOMETRIUM -WNL. RT OV- WNL DOMINANT FOLLICLE 3.39 CM. LT OV- APPEARS POLYCYSTIC. LT ADNEXA- COMPLEX, SEPATED AREA AGAIN NOTED SURROUNDING LT OVARY- ?SALPINGITIS  Images 13:  transverse cervix, R ov: 15, 17,16, left adnexa:  21 & 27    23  US today:    ENLARGED, HETEROGENEOUS, ANTEVERTED UT, , vol 119.  1 fibroid ~ 1 cm  ENDOMETRIUM - 8. 37MM  CERVIX - SMALL BLOOD FILLED NABOTHIAN CYST 1 X 0.7 X 1CM. RT OV - APPEARS WNL  RT ADNEXA - APPEARS WNL  LT OV - COMPLEX, HEMORRHAGIC CYST MEASURING 1.4 X 0.9 X 1.8CM. LT ADNEXA - APPEARS WNL TODAY,  THE PREVIOUSLY VISUALIZED COMPLEX SEPTATED  AREA IS NOT VISUALIZED TODAY. NO FF    BC:   condoms    OB History          4    Para   3    Term   2       1    AB   1    Living   3         SAB        IAB   1    Ectopic        Molar        Multiple        Live Births   3              Patient's last menstrual period was 2023 (exact date).   Past Surgical History:   Procedure Laterality Date    CHOLECYSTECTOMY  2021    Laparoscopic Cholecystectomy     Past Medical History:   Diagnosis Date    Abnormal Papanicolaou smear of cervix     2016 (postpartum) ASCUS, neg HPV     C. difficile diarrhea 2021    COVID-19 vaccine series completed 2021    Juan Farias    Encounter for insertion of mirena IUD 2017    GERD (gastroesophageal reflux disease)     Headache     Hypertension     Pancreatitis     PUD (peptic ulcer disease)     Trichomonas vaginitis 2020    pap w/ + trich     Family History   Problem Relation Age of Onset    Ovarian Cancer Neg Hx Colon Cancer Neg Hx     Breast Cancer Neg Hx     Headache Neg Hx     Bleeding Prob Neg Hx     Rheum Arthritis Neg Hx     Alcohol Abuse Neg Hx     Diabetes Maternal Aunt     Diabetes Maternal Uncle     Hypertension Maternal Grandfather     Hypertension Maternal Grandmother     Diabetes Maternal Grandmother     Lung Disease Maternal Grandmother     Thyroid Disease Mother     Migraines Mother     Hypertension Mother     Prostate Cancer Neg Hx        Review of Systems    Updated from patient's \"Review of Systems\" form that patient completed. Physical Exam:   /68   Ht 5' 4\" (1.626 m)   Wt 183 lb 9.6 oz (83.3 kg)   LMP 02/20/2023 (Exact Date)   BMI 31.51 kg/m²     Patient is a 28 y.o. female who appears pleasant, in no apparent distress, her given age, well developed, well nourished and with good attention to hygiene and body habitus. Oriented to person, place and time. Mood and affect normal and appropriate to situation. Head is normocephalic, atraumatic, without any gross head or neck masses. PE not repeated    A/P:  1. Follow up:  Doing well    10/17/2022 ov  HPI:    doing well, no pelvic pain, childbearing is complete. Doing pelvic floor PT 2x/wk x 6 wk. LMP 9/10/22, monthly menses last 5 d. No significant cramping and normal flow, uses 4-5 pads/d max. Last sex ~ 9/18/2022, no dyspareunia     Seeing pain management 2x/wk    A/P:  1. Follow up:  Per pt dxd w/ exocrine pancreatic insufficiency, taking Creon, able to eat more. Pelvic US reviewed w/ US tech, left FT appears to be surrounding the left ovary, possible adhesions. Long d/w pt, she is doing much better, denies pelvic pain, leaning toward expectant management if follow up US(s) remain stable/pt asx.    Recent severe pancreatic issues resolving, improved w/Creon    Re ROS--> non gynecologic concerns referred back to her PCP or appropriate specialist.     Orders Placed This Encounter   Procedures    BATSHEVA Leis Diagnosis Orders   1. Salpingitis  AMB POC US, TRANSVAGINAL      2. LLQ pain  AMB POC US, TRANSVAGINAL          Gloria El MD, FACOG    Serial USs  1/19/2017 US:  ENLARGED HETEROGENEOUS UTERUS, 11/7/5.5 cm, vol 207- NO FIBROIDS SEEN  CERVIX POSITIONALLY APPEARS TO HAVE A STEEP CURVE  RT OV WNL  LT OV WNL    5/18/2020 US:    HETEROGENOUS UT (9/5/4) WITH IUD SEEN WNL IN THE FUNDAL CAVITY. THE IUD STRING IS IN THE CERVICAL CANAL APPROX. 3.8MM AWAY  FROM THE EXTERNAL OS. RT OV-APPEARS POLYCYSTIC. LT OV- APPEARS POLYCYSTIC.      7/7/2022-->[facility], dxd w/ UTI and left salpingitis. Per pt, US--> free fluid in her pelvis   7/07/2022 [facility], Pelvic US  PELVIC ULTRASOUND. INDICATION:  Pelvic pain. Abnormal CAT scan. Color flow and Doppler of the ovaries performed. FINDINGS: Uterus: measures 9.6 cm x 4.4 cm x 4. cm. The endometrial echo stripe is normal thickness, 6-7 mm. Echogenic IUD is present. Right ovary: 3.4 x 2.4 x 2.2 cm. There is blood flow documented in the right ovary. Left ovary: 3.3 x 2.5 x 2.6 cm. There is blood flow documented in the left ovary. Complex septated low-attenuation focus in the left adnexa appears hyperemic, possibly inflamed fallopian tube. Negative for ovarian torsion. Findings as above suggesting possible left salpingitis. 10/17/2022 US today:  ANTEVERTED HETEROGENOUS UTERUS,  11/5.5/5, vol 150. SMALL 1 CM FIBOID NOTED ANTERIOR RT UTERUS (<1 cm). NABOTHIAN CYSTS. ENDOMETRIUM -WNL. RT OV- WNL DOMINANT FOLLICLE 7.18 CM. LT OV- APPEARS POLYCYSTIC. LT ADNEXA- COMPLEX, SEPATED AREA AGAIN NOTED SURROUNDING LT OVARY- ?SALPINGITIS  Images 13:  transverse cervix, R ov: 15, 17,16, left adnexa:  21 & 27    2/21/23  US today:    ENLARGED, HETEROGENEOUS, ANTEVERTED UT, 9/5/5, vol 119.  1 fibroid ~ 1 cm  ENDOMETRIUM - 8. 37MM  CERVIX - SMALL BLOOD FILLED NABOTHIAN CYST 1 X 0.7 X 1CM.   RT OV - APPEARS WNL  RT ADNEXA - APPEARS WNL  LT OV - COMPLEX, HEMORRHAGIC CYST MEASURING 1.4 X 0.9 X 1.8CM. LT ADNEXA - APPEARS WNL TODAY,  THE PREVIOUSLY VISUALIZED COMPLEX SEPTATED  AREA IS NOT VISUALIZED TODAY.   NO FF

## 2023-02-22 NOTE — PROGRESS NOTES
[address]  [address] [place] [address] [address]  [phone]          Sujata Fischer  : 1990    Chief Complaint   Patient presents with    New Patient          HPI     Sujata Fischer is a 28 y.o. female    Referred to us for recurrent pyelonephritis. Pt says she has been hospitalized twice with urinary infection. Symptoms at time of infection exhibit as LLQ pain. She has also had fever . No dysuria, or hematuria. She was seen in the emergency department at [facility] 2023. At that time she was having flank pain, abdominal pain. She has seen her primary care 2 weeks prior to this ER visit was given an antibiotic for questionable UTI. No urine culture was done at that time. Patient's pain at the time of her ER visit was 7 out of 10. She was also having some pain in the midsternal area. She was treated with antibiotic. Eventually she was discharged home. She was in the ER  and also  for the same symptoms. Her initial ER visit  however she had had some sort of seizure type disorder. Imaging at the time of that ER visit revealed some fluid within the left kidney suggestive of possible pyelonephritis. Urine cultured and imaging done over the last couple months are as follows. Urine cultures as below:  2016 urine culture neg  2016 urine culture neg  2022 urine culture neg.   2022 urine culture showed yeast only. IMAGING:  CT 2022  1. Hepatic steatosis status post cholecystectomy. 2. Contracted colon. CT 2022-  Free fluid and stranding densities in the pelvis, more on the left   with indistinct left adnexa and prominent left gonadal vein. Left tubo-ovarian   pathology including torsion or infection should be considered. Pelvic ultrasound   may be helpful. CT 2022 -IMPRESSION:   1. Asymmetrically enlarged and heterogenous left kidney, concerning for pyelonephritis in the appropriate clinical setting. 2. Fatty liver. 3. Cholecystectomy. She is asymptotic today. Her urine is clear today. She is not having any pain today. Past Medical History:   Diagnosis Date    Abnormal Papanicolaou smear of cervix     12/2016 (postpartum) ASCUS, neg HPV     C. difficile diarrhea 03/2021    COVID-19 vaccine series completed 05/13/2021    Moderna    Encounter for insertion of mirena IUD 02/07/2017    GERD (gastroesophageal reflux disease)     Headache     Hypertension     Pancreatitis     PUD (peptic ulcer disease)     Trichomonas vaginitis 02/2020    pap w/ + trich     Past Surgical History:   Procedure Laterality Date    CHOLECYSTECTOMY  07/30/2021    Laparoscopic Cholecystectomy     Current Outpatient Medications   Medication Sig Dispense Refill    tiZANidine (ZANAFLEX) 4 MG tablet TAKE 1 TABLET BY MOUTH EVERY 8 HOURS AS NEEDED FOR UP TO 10 DAYS. DULoxetine (CYMBALTA) 60 MG extended release capsule Take 60 mg by mouth every evening      CREON [id number] units CPEP delayed release capsule TAKE 2 CAPSULES BY MOUTH 3 TIMES DAILY WITH MEALS. TAKE 1 CAPSULE BY MOUTH WITH SNACKS.      gabapentin (NEURONTIN) 300 MG capsule Take 300 mg by mouth in the morning and 300 mg at noon and 300 mg before bedtime. vitamin D 25 MCG (1000 UT) CAPS Take by mouth daily      metoprolol tartrate (LOPRESSOR) 25 MG tablet Take 25 mg by mouth 2 times daily      ondansetron (ZOFRAN) 8 MG tablet Take 8 mg by mouth every 8 hours as needed      pantoprazole (PROTONIX) 40 MG tablet Take 40 mg by mouth daily       No current facility-administered medications for this visit.      No Known Allergies  Social History     Socioeconomic History    Marital status: Single     Spouse name: Not on file    Number of children: Not on file    Years of education: Not on file    Highest education level: Not on file   Occupational History    Not on file   Tobacco Use    Smoking status: Never    Smokeless tobacco: Never   Substance and Sexual Activity Alcohol use: Not Currently     Alcohol/week: 0.0 standard drinks    Drug use: Never    Sexual activity: Yes     Partners: Male     Birth control/protection: I.U.D. Other Topics Concern    Not on file   Social History Narrative    Not on file     Social Determinants of Health     Financial Resource Strain: Low Risk     Difficulty of Paying Living Expenses: Not very hard   Food Insecurity: No Food Insecurity    Worried About Running Out of Food in the Last Year: Never true    Ran Out of Food in the Last Year: Never true   Transportation Needs: Unknown    Lack of Transportation (Medical): Not on file    Lack of Transportation (Non-Medical): No   Physical Activity: Not on file   Stress: Not on file   Social Connections: Not on file   Intimate Partner Violence: Not on file   Housing Stability: Unknown    Unable to Pay for Housing in the Last Year: Not on file    Number of Places Lived in the Last Year: Not on file    Unstable Housing in the Last Year: No     Family History   Problem Relation Age of Onset    Ovarian Cancer Neg Hx     Colon Cancer Neg Hx     Breast Cancer Neg Hx     Headache Neg Hx     Bleeding Prob Neg Hx     Rheum Arthritis Neg Hx     Alcohol Abuse Neg Hx     Diabetes Maternal Aunt     Diabetes Maternal Uncle     Hypertension Maternal Grandfather     Hypertension Maternal Grandmother     Diabetes Maternal Grandmother     Lung Disease Maternal Grandmother     Thyroid Disease Mother     Migraines Mother     Hypertension Mother     Prostate Cancer Neg Hx        Review of Systems  Constitutional:   Negative for fever, chills, appetite change, malaise/fatigue, headaches and weight loss. Skin:  Negative for skin lesions, rash and itching. Eyes:  Negative for visual disturbance, eye pain and eye discharge. ENT:  Negative for difficulty articulating words, pain swallowing, high frequency hearing loss and dry mouth.   Respiratory:  Negative for cough, blood in sputum, shortness of breath and wheezing. Cardiovascular: Positive for hypertension. Negative for chest pain, irregular heartbeat, leg pain, leg swelling, regular rate and rhythm and varicose veins. GI: Positive for nausea, vomiting and diarrhea. Negative for abdominal pain, blood in stool, constipation, indigestion and heartburn. Genitourinary: Positive for recurrent UTIs. Negative for urinary burning, hematuria, flank pain, history of urolithiasis, nocturia, slower stream, straining, urgency, leakage w/ urge, frequent urination, incomplete emptying, sexually transmitted disease, menstrual problem, endometriosis, vaginal pain and hysterectomy. Number of pregnancies: 3. Number of births: 3. Musculoskeletal:  Negative for back pain, bone pain, arthralgias, tenderness, muscle weakness and neck pain. Neurological: Positive for seizures. Negative for dizziness, focal weakness, numbness and tremors. Psychological:  Negative for depression and psychiatric problem. Endocrine:  Negative for cold intolerance, thirst, excessive urination, fatigue and heat intolerance. Hem/Lymphatic:  Negative for easy bleeding, easy bruising and frequent infections. Urinalysis  UA - Dipstick  Results for orders placed or performed in visit on 02/22/23   AMB POC URINALYSIS DIP STICK AUTO W/O MICRO   Result Value Ref Range    Color (UA POC)      Clarity (UA POC)      Glucose, Urine, POC Negative Negative    Bilirubin, Urine, POC Negative Negative    KETONES, Urine, POC Negative Negative    Specific Gravity, Urine, POC 1.010 1.001 - 1.035    Blood (UA POC) Large Negative    pH, Urine, POC 7.0 4.6 - 8.0    Protein, Urine, POC Negative Negative    Urobilinogen, POC 0.2 mg/dL     Nitrite, Urine, POC Negative Negative    Leukocyte Esterase, Urine, POC Negative Negative           Assessment and Plan    ICD-10-CM    1. Recurrent pyelonephritis  N12 AMB POC URINALYSIS DIP STICK AUTO W/O MICRO     Culture, Urine     Culture, Urine      2. LLQ pain  R10.32       3.  OAB (overactive bladder)  N32.81         LLQ abd pain   No pain today. No reason for this pain has been found other than the possibility of questionable pyelonephritis as seen in 11/22 imaging. All other imaging has shown kidney to be normal.    UTI  We discussed UTI prevention in great detail. Reasons for urinary infection were discussed in detail. Pt was instructed that females are more prone to infections than men. We reviewed genital hygiene as she should wipe from front to back. I recommend showers over bath. Pt should void after intercourse and she should try to drink plenty of water. However, I see no evidence that she has had infection. All urine cultures and imaging have been normal concerning the kidneys. OAB-   I do feel she is having issues with urinary frequency/OAB. I will given samples of myrbetriq 50mg po daily for her feeling of urgency and frequency. I will see her back in 6 weeks for urine check. If she has any symptoms prior to this appt she will call to bring a urine sample. Debe Dresser, APRN - NP Dr. Adolm Mortimer is supervising physician today and he approves plan of care. Return in about 6 weeks (around 4/5/2023). Elements of this note have been dictated using speech recognition software. Although reviewed, errors of speech recognition may have occurred.

## 2023-02-25 LAB
BACTERIA SPEC CULT: NORMAL
SERVICE CMNT-IMP: NORMAL

## 2023-03-13 ENCOUNTER — OFFICE VISIT (OUTPATIENT)
Dept: OBGYN CLINIC | Age: 33
End: 2023-03-13
Payer: COMMERCIAL

## 2023-03-13 VITALS
SYSTOLIC BLOOD PRESSURE: 138 MMHG | BODY MASS INDEX: 30.9 KG/M2 | WEIGHT: 181 LBS | DIASTOLIC BLOOD PRESSURE: 86 MMHG | HEIGHT: 64 IN

## 2023-03-13 DIAGNOSIS — Z11.3 SCREENING FOR STD (SEXUALLY TRANSMITTED DISEASE): ICD-10-CM

## 2023-03-13 DIAGNOSIS — Z12.4 SCREENING FOR CERVICAL CANCER: ICD-10-CM

## 2023-03-13 DIAGNOSIS — Z12.39 SCREENING BREAST EXAMINATION: ICD-10-CM

## 2023-03-13 DIAGNOSIS — Z01.419 WELL WOMAN EXAM WITH ROUTINE GYNECOLOGICAL EXAM: Primary | ICD-10-CM

## 2023-03-13 PROCEDURE — G8484 FLU IMMUNIZE NO ADMIN: HCPCS | Performed by: OBSTETRICS & GYNECOLOGY

## 2023-03-13 PROCEDURE — 99395 PREV VISIT EST AGE 18-39: CPT | Performed by: OBSTETRICS & GYNECOLOGY

## 2023-03-13 ASSESSMENT — PATIENT HEALTH QUESTIONNAIRE - PHQ9
SUM OF ALL RESPONSES TO PHQ QUESTIONS 1-9: 0
SUM OF ALL RESPONSES TO PHQ9 QUESTIONS 1 & 2: 0
SUM OF ALL RESPONSES TO PHQ QUESTIONS 1-9: 0
SUM OF ALL RESPONSES TO PHQ QUESTIONS 1-9: 0
2. FEELING DOWN, DEPRESSED OR HOPELESS: 0
1. LITTLE INTEREST OR PLEASURE IN DOING THINGS: 0
SUM OF ALL RESPONSES TO PHQ QUESTIONS 1-9: 0

## 2023-03-13 NOTE — PROGRESS NOTES
Annual Exam  Established deborah Carbajal    28 y.o.  1990  [id number]    Today:  3/13/23                 *      Patient requests:   well woman annual exam.  Reports:       Went to the ER 2 weeks ago due to  problems with her balance and loss of consciousness. Was diagnosed with severe dehydration. Per patient had a seizure, is scheduled to follow-up with neurology. Concerns re sore at the left side of her lip/oral commissure. Periods are: regular, monthly last 4-5 d, no pain, aren't heavy    BC:   condoms .       Past Medical History:   Diagnosis Date    Abnormal Papanicolaou smear of cervix     2016 (postpartum) ASCUS, neg HPV     C. difficile diarrhea 2021    COVID-19 vaccine series completed 2021    Moderna    Encounter for insertion of mirena IUD 2017    GERD (gastroesophageal reflux disease)     Headache     Hypertension     Pancreatitis     PUD (peptic ulcer disease)     Trichomonas vaginitis 2020    pap w/ + trich       Past Surgical History:   Procedure Laterality Date    CHOLECYSTECTOMY  2021    Laparoscopic Cholecystectomy       Family History   Problem Relation Age of Onset    Thyroid Disease Mother     Migraines Mother     Hypertension Mother     Hypertension Maternal Grandmother     Diabetes Maternal Grandmother     Lung Disease Maternal Grandmother     Hypertension Maternal Grandfather     Diabetes Maternal Aunt     Diabetes Maternal Uncle     Ovarian Cancer Neg Hx     Colon Cancer Neg Hx     Breast Cancer Neg Hx     Headache Neg Hx     Bleeding Prob Neg Hx     Rheum Arthritis Neg Hx     Alcohol Abuse Neg Hx     Prostate Cancer Neg Hx     Deep Vein Thrombosis Neg Hx     Pulmonary Embolism Neg Hx        OB History    Para Term  AB Living   4 3 2 1 1 3   SAB IAB Ectopic Molar Multiple Live Births   0 1 0 0 0 3      # Outcome Date GA Lbr Walker/2nd Weight Sex Delivery Anes PTL Lv   4  16 36w2d 00:54 / 00:01 5 lb 5.7 oz (2.43 kg) F

## 2023-03-19 LAB
C TRACH RRNA CVX QL NAA+PROBE: NEGATIVE
CYTOLOGIST CVX/VAG CYTO: NORMAL
CYTOLOGY CVX/VAG DOC THIN PREP: NORMAL
HPV APTIMA: NEGATIVE
HPV GENOTYPE REFLEX: NORMAL
Lab: NORMAL
N GONORRHOEA RRNA CVX QL NAA+PROBE: NEGATIVE
PATH REPORT.FINAL DX SPEC: NORMAL
STAT OF ADQ CVX/VAG CYTO-IMP: NORMAL
T VAGINALIS RRNA SPEC QL NAA+PROBE: NEGATIVE

## 2023-05-03 ENCOUNTER — OFFICE VISIT (OUTPATIENT)
Dept: UROLOGY | Age: 33
End: 2023-05-03
Payer: COMMERCIAL

## 2023-05-03 DIAGNOSIS — N12 RECURRENT PYELONEPHRITIS: ICD-10-CM

## 2023-05-03 DIAGNOSIS — N32.81 OAB (OVERACTIVE BLADDER): Primary | ICD-10-CM

## 2023-05-03 LAB
BILIRUBIN, URINE, POC: NORMAL
BLOOD URINE, POC: NEGATIVE
GLUCOSE URINE, POC: NEGATIVE
KETONES, URINE, POC: 15
LEUKOCYTE ESTERASE, URINE, POC: NEGATIVE
NITRITE, URINE, POC: NEGATIVE
PH, URINE, POC: 6 (ref 4.6–8)
PROTEIN,URINE, POC: NORMAL
SPECIFIC GRAVITY, URINE, POC: 1.03 (ref 1–1.03)
URINALYSIS CLARITY, POC: NORMAL
URINALYSIS COLOR, POC: NORMAL
UROBILINOGEN, POC: NORMAL

## 2023-05-03 PROCEDURE — G8417 CALC BMI ABV UP PARAM F/U: HCPCS | Performed by: NURSE PRACTITIONER

## 2023-05-03 PROCEDURE — 99213 OFFICE O/P EST LOW 20 MIN: CPT | Performed by: NURSE PRACTITIONER

## 2023-05-03 PROCEDURE — 1036F TOBACCO NON-USER: CPT | Performed by: NURSE PRACTITIONER

## 2023-05-03 PROCEDURE — G8428 CUR MEDS NOT DOCUMENT: HCPCS | Performed by: NURSE PRACTITIONER

## 2023-05-03 PROCEDURE — 81003 URINALYSIS AUTO W/O SCOPE: CPT | Performed by: NURSE PRACTITIONER

## 2023-05-03 RX ORDER — CHLORDIAZEPOXIDE HYDROCHLORIDE 10 MG/1
CAPSULE, GELATIN COATED ORAL
COMMUNITY
Start: 2023-05-02

## 2023-05-03 ASSESSMENT — ENCOUNTER SYMPTOMS
NAUSEA: 0
BACK PAIN: 0

## 2023-05-03 NOTE — PROGRESS NOTES
[address]  [address] [place], [place] [address]  [phone]          Rani Narvaez  : 1990    Chief Complaint   Patient presents with    Urinary Tract Infection          HPI     Rani Narvaez is a 28 y.o. female  Returns today for follow-up on urinary check. At her last visit the urine was clear. She had had an episode last year of possible pyelonephritis. Follow-up imaging however showed kidneys to be normal.  She is also been having on and off chronic flank pain. She is following up with PCP on this. No pain today. She has had all negative cultures in the past.  Was complaining of some urinary frequency and urgency. Pressure in the lower abdomen only. She was given Myrbetriq 50 mg p.o. to take. She does not remember using the medication is unclear as to whether or not it really helped the symptoms. Her urine is perfectly clear today. She is poor historian. Past Medical History:   Diagnosis Date    Abnormal Papanicolaou smear of cervix     2016 (postpartum) ASCUS, neg HPV     C. difficile diarrhea 2021    COVID-19 vaccine series completed 2021    Moderna    Encounter for insertion of mirena IUD 2017    GERD (gastroesophageal reflux disease)     Headache     Hypertension     Pancreatitis     PUD (peptic ulcer disease)     Trichomonas vaginitis 2020    pap w/ + trich     Past Surgical History:   Procedure Laterality Date    CHOLECYSTECTOMY  2021    Laparoscopic Cholecystectomy     Current Outpatient Medications   Medication Sig Dispense Refill    mirabegron (MYRBETRIQ) 50 MG TB24 Take 50 mg by mouth daily 90 tablet 3    tiZANidine (ZANAFLEX) 4 MG tablet TAKE 1 TABLET BY MOUTH EVERY 8 HOURS AS NEEDED FOR UP TO 10 DAYS. CREON [id number] units CPEP delayed release capsule TAKE 2 CAPSULES BY MOUTH 3 TIMES DAILY WITH MEALS.  TAKE 1 CAPSULE BY MOUTH WITH SNACKS.      gabapentin (NEURONTIN) 300 MG capsule Take 1 capsule by mouth

## 2024-03-25 SDOH — ECONOMIC STABILITY: FOOD INSECURITY: WITHIN THE PAST 12 MONTHS, YOU WORRIED THAT YOUR FOOD WOULD RUN OUT BEFORE YOU GOT MONEY TO BUY MORE.: PATIENT DECLINED

## 2024-03-25 SDOH — ECONOMIC STABILITY: INCOME INSECURITY: HOW HARD IS IT FOR YOU TO PAY FOR THE VERY BASICS LIKE FOOD, HOUSING, MEDICAL CARE, AND HEATING?: NOT VERY HARD

## 2024-03-25 SDOH — ECONOMIC STABILITY: FOOD INSECURITY: WITHIN THE PAST 12 MONTHS, THE FOOD YOU BOUGHT JUST DIDN'T LAST AND YOU DIDN'T HAVE MONEY TO GET MORE.: PATIENT DECLINED

## 2024-03-25 SDOH — ECONOMIC STABILITY: TRANSPORTATION INSECURITY
IN THE PAST 12 MONTHS, HAS LACK OF TRANSPORTATION KEPT YOU FROM MEETINGS, WORK, OR FROM GETTING THINGS NEEDED FOR DAILY LIVING?: PATIENT DECLINED

## 2024-03-25 SDOH — ECONOMIC STABILITY: HOUSING INSECURITY
IN THE LAST 12 MONTHS, WAS THERE A TIME WHEN YOU DID NOT HAVE A STEADY PLACE TO SLEEP OR SLEPT IN A SHELTER (INCLUDING NOW)?: PATIENT DECLINED

## 2024-03-26 ENCOUNTER — OFFICE VISIT (OUTPATIENT)
Dept: OBGYN CLINIC | Age: 34
End: 2024-03-26
Payer: COMMERCIAL

## 2024-03-26 VITALS
DIASTOLIC BLOOD PRESSURE: 68 MMHG | HEIGHT: 64 IN | SYSTOLIC BLOOD PRESSURE: 112 MMHG | WEIGHT: 182.4 LBS | BODY MASS INDEX: 31.14 KG/M2

## 2024-03-26 DIAGNOSIS — Z11.51 SCREENING FOR HPV (HUMAN PAPILLOMAVIRUS): ICD-10-CM

## 2024-03-26 DIAGNOSIS — Z12.4 SCREENING FOR CERVICAL CANCER: ICD-10-CM

## 2024-03-26 DIAGNOSIS — Z01.419 WELL WOMAN EXAM WITH ROUTINE GYNECOLOGICAL EXAM: Primary | ICD-10-CM

## 2024-03-26 DIAGNOSIS — Z11.3 SCREENING FOR STD (SEXUALLY TRANSMITTED DISEASE): ICD-10-CM

## 2024-03-26 LAB
HBV SURFACE AG SER QL: NONREACTIVE
HCV AB SER QL: NONREACTIVE
HIV 1+2 AB+HIV1 P24 AG SERPL QL IA: NONREACTIVE
HIV 1/2 RESULT COMMENT: NORMAL

## 2024-03-26 PROCEDURE — 99395 PREV VISIT EST AGE 18-39: CPT | Performed by: OBSTETRICS & GYNECOLOGY

## 2024-03-26 PROCEDURE — G8484 FLU IMMUNIZE NO ADMIN: HCPCS | Performed by: OBSTETRICS & GYNECOLOGY

## 2024-03-26 PROCEDURE — 99459 PELVIC EXAMINATION: CPT | Performed by: OBSTETRICS & GYNECOLOGY

## 2024-03-26 NOTE — PROGRESS NOTES
Collecting system: No evidence of hydronephrosis.   URINARY BLADDER:   Unremarkable.   Partially imaged liver is diffusely echogenic.   Free fluid again seen about the left kidney.    IMPRESSION:   1.  Free fluid again seen about the left kidney.   2.  Echogenic liver compatible with steatosis and/(or fibrosis.   3.  1.1 cm hyperechoic lesion about the left kidney. No definite correlate on comparison CT or MRI. This could be a tiny angiomyolipoma.      2022 gyn ov:   Follow-Up from Hospital        UTI, infection and  fluid around left fallopian tube   Subjective:  Roslyn Malik is a 31 y.o. , severe LLQ pelvic pain/couldn't walk.  Pain is  always left-sided--> [facility], rxd:  Rocephin, Keflex, Doxycycline and Flagyl.      FU 2022 [facility] ER visit--> h/o IBS, chronic pancreatitis, GERD, PUD, s/p cholecystectomy c/o 4 d constant LLQ pain  Denies:  bloody stools, h/o kidney stones, afeb     Has not been sexually active for ~ 2+ y, ER wondered if removing her IUD would help her symptoms.  Pancreatic symptoms started 2022  Mirena IUD expires 2024.  Patient wants Mirena IUD removed.     Prior to Mirena IUD-->medium-heavy menstrual flow with clots, no significant pain     HPI:   2022-->[facility], dxd w/ UTI and left salpingitis.  Per pt, US--> free fluid in her pelvis     A/P:  1.  31 y.o. , FU 2022 [facility] visit-->4 d severe, constant, always LLQ pelvic pain/couldn't walk, --> Txd w/:  Rocephin, Keflex, Doxycycline and Flagyl.      H/o IBS, chronic pancreatitis, GERD, PUD, s/p cholecystectomy c/o 4 d constant LLQ pain  Denies:  bloody stools, h/o kidney stones, afeb     Abstinent~ 2+ y, ER wondered if removing her IUD would help her symptoms.     Pancreatic symptoms started 2022.  Mirena IUD inserted 2017  Mirena IUD expires 2024.    Patient wants Mirena IUD removed (abstinent ~2 years)-->IUD removed today.     Prior to Mirena IUD-->medium-heavy menstrual flow with

## 2024-03-27 LAB — RPR SER QL: NONREACTIVE

## 2024-04-03 LAB
C TRACH RRNA CVX QL NAA+PROBE: NEGATIVE
COLLECTION METHOD: NORMAL
CYTOLOGIST CVX/VAG CYTO: NORMAL
CYTOLOGY CVX/VAG DOC THIN PREP: NORMAL
DATE OF LMP: NORMAL
HPV APTIMA: NEGATIVE
HPV GENOTYPE REFLEX: NORMAL
Lab: NORMAL
N GONORRHOEA RRNA CVX QL NAA+PROBE: NEGATIVE
PAP SOURCE: NORMAL
PATH REPORT.FINAL DX SPEC: NORMAL
PREV TREATMENT: NORMAL
STAT OF ADQ CVX/VAG CYTO-IMP: NORMAL
T VAGINALIS RRNA SPEC QL NAA+PROBE: NEGATIVE

## 2024-11-27 ENCOUNTER — HOSPITAL ENCOUNTER (EMERGENCY)
Age: 34
Discharge: HOME | End: 2024-11-27
Attending: EMERGENCY MEDICINE
Payer: COMMERCIAL

## 2024-11-27 VITALS
RESPIRATION RATE: 18 BRPM | SYSTOLIC BLOOD PRESSURE: 144 MMHG | BODY MASS INDEX: 29.52 KG/M2 | TEMPERATURE: 98.4 F | OXYGEN SATURATION: 97 % | WEIGHT: 172 LBS | DIASTOLIC BLOOD PRESSURE: 63 MMHG | HEART RATE: 92 BPM

## 2024-11-27 DIAGNOSIS — K29.21 ALCOHOLIC GASTRITIS WITH HEMORRHAGE, UNSPECIFIED CHRONICITY: ICD-10-CM

## 2024-11-27 DIAGNOSIS — K22.6 MALLORY-WEISS TEAR: ICD-10-CM

## 2024-11-27 DIAGNOSIS — K92.0 HEMATEMESIS WITH NAUSEA: Primary | ICD-10-CM

## 2024-11-27 LAB
ALBUMIN SERPL-MCNC: 4.6 G/DL (ref 3.5–5)
ALBUMIN/GLOB SERPL: 0.9 (ref 1–1.9)
ALP SERPL-CCNC: 192 U/L (ref 35–104)
ALT SERPL-CCNC: 76 U/L (ref 8–45)
ANION GAP SERPL CALC-SCNC: 20 MMOL/L (ref 7–16)
AST SERPL-CCNC: 341 U/L (ref 15–37)
BASOPHILS # BLD: 0 K/UL (ref 0–0.2)
BASOPHILS NFR BLD: 1 % (ref 0–2)
BILIRUB SERPL-MCNC: 1.8 MG/DL (ref 0–1.2)
BUN SERPL-MCNC: 6 MG/DL (ref 6–23)
CALCIUM SERPL-MCNC: 9.6 MG/DL (ref 8.8–10.2)
CHLORIDE SERPL-SCNC: 98 MMOL/L (ref 98–107)
CO2 SERPL-SCNC: 22 MMOL/L (ref 20–29)
CREAT SERPL-MCNC: 0.68 MG/DL (ref 0.6–1.1)
DIFFERENTIAL METHOD BLD: ABNORMAL
EOSINOPHIL # BLD: 0.1 K/UL (ref 0–0.8)
EOSINOPHIL NFR BLD: 2 % (ref 0.5–7.8)
ERYTHROCYTE [DISTWIDTH] IN BLOOD BY AUTOMATED COUNT: 18.4 % (ref 11.9–14.6)
GLOBULIN SER CALC-MCNC: 4.9 G/DL (ref 2.3–3.5)
GLUCOSE SERPL-MCNC: 101 MG/DL (ref 70–99)
HCG SERPL-ACNC: <1 MIU/ML
HCT VFR BLD AUTO: 40.1 % (ref 35.8–46.3)
HGB BLD-MCNC: 13.4 G/DL (ref 11.7–15.4)
IMM GRANULOCYTES # BLD AUTO: 0 K/UL (ref 0–0.5)
IMM GRANULOCYTES NFR BLD AUTO: 0 % (ref 0–5)
INR PPP: 1.3
LIPASE SERPL-CCNC: 10 U/L (ref 13–60)
LYMPHOCYTES # BLD: 2.7 K/UL (ref 0.5–4.6)
LYMPHOCYTES NFR BLD: 44 % (ref 13–44)
MCH RBC QN AUTO: 30.5 PG (ref 26.1–32.9)
MCHC RBC AUTO-ENTMCNC: 33.4 G/DL (ref 31.4–35)
MCV RBC AUTO: 91.1 FL (ref 82–102)
MONOCYTES # BLD: 0.2 K/UL (ref 0.1–1.3)
MONOCYTES NFR BLD: 3 % (ref 4–12)
NEUTS SEG # BLD: 3.1 K/UL (ref 1.7–8.2)
NEUTS SEG NFR BLD: 51 % (ref 43–78)
NRBC # BLD: 0 K/UL (ref 0–0.2)
PLATELET # BLD AUTO: 344 K/UL (ref 150–450)
PMV BLD AUTO: 10.4 FL (ref 9.4–12.3)
POTASSIUM SERPL-SCNC: 3.8 MMOL/L (ref 3.5–5.1)
PROT SERPL-MCNC: 9.5 G/DL (ref 6.3–8.2)
PROTHROMBIN TIME: 16.7 SEC (ref 11.3–14.9)
RBC # BLD AUTO: 4.4 M/UL (ref 4.05–5.2)
SODIUM SERPL-SCNC: 140 MMOL/L (ref 136–145)
WBC # BLD AUTO: 6.2 K/UL (ref 4.3–11.1)

## 2024-11-27 PROCEDURE — 83690 ASSAY OF LIPASE: CPT

## 2024-11-27 PROCEDURE — 84702 CHORIONIC GONADOTROPIN TEST: CPT

## 2024-11-27 PROCEDURE — 96374 THER/PROPH/DIAG INJ IV PUSH: CPT

## 2024-11-27 PROCEDURE — 85025 COMPLETE CBC W/AUTO DIFF WBC: CPT

## 2024-11-27 PROCEDURE — 85610 PROTHROMBIN TIME: CPT

## 2024-11-27 PROCEDURE — 96375 TX/PRO/DX INJ NEW DRUG ADDON: CPT

## 2024-11-27 PROCEDURE — 80053 COMPREHEN METABOLIC PANEL: CPT

## 2024-11-27 PROCEDURE — 2500000003 HC RX 250 WO HCPCS: Performed by: EMERGENCY MEDICINE

## 2024-11-27 PROCEDURE — 6370000000 HC RX 637 (ALT 250 FOR IP): Performed by: EMERGENCY MEDICINE

## 2024-11-27 PROCEDURE — 6360000002 HC RX W HCPCS: Performed by: EMERGENCY MEDICINE

## 2024-11-27 PROCEDURE — 99284 EMERGENCY DEPT VISIT MOD MDM: CPT

## 2024-11-27 PROCEDURE — 2580000003 HC RX 258: Performed by: EMERGENCY MEDICINE

## 2024-11-27 RX ORDER — ONDANSETRON 8 MG/1
8 TABLET, ORALLY DISINTEGRATING ORAL EVERY 8 HOURS PRN
Qty: 12 TABLET | Refills: 2 | Status: SHIPPED | OUTPATIENT
Start: 2024-11-27

## 2024-11-27 RX ORDER — METOCLOPRAMIDE HYDROCHLORIDE 5 MG/ML
10 INJECTION INTRAMUSCULAR; INTRAVENOUS
Status: COMPLETED | OUTPATIENT
Start: 2024-11-27 | End: 2024-11-27

## 2024-11-27 RX ADMIN — FAMOTIDINE 20 MG: 10 INJECTION, SOLUTION INTRAVENOUS at 17:38

## 2024-11-27 RX ADMIN — METOCLOPRAMIDE 10 MG: 5 INJECTION, SOLUTION INTRAMUSCULAR; INTRAVENOUS at 17:38

## 2024-11-27 RX ADMIN — HYOSCYAMINE SULFATE 0.25 MG: 0.12 TABLET ORAL; SUBLINGUAL at 17:40

## 2024-11-27 ASSESSMENT — ENCOUNTER SYMPTOMS
SHORTNESS OF BREATH: 0
VOMITING: 1
COUGH: 0
RHINORRHEA: 0
FACIAL SWELLING: 0
EYE DISCHARGE: 0
ABDOMINAL PAIN: 0
COLOR CHANGE: 0
NAUSEA: 1
EYE REDNESS: 0

## 2024-11-27 NOTE — ED TRIAGE NOTES
Pt states she was recently put on abx for dental infection and has been vomiting bright red blood x1 hr and she was told to come to er if she had any worsening symptoms.

## 2024-11-27 NOTE — ED PROVIDER NOTES
Emergency Department Provider Note       PCP: Randall Douglas Jr., MD   Age: 34 y.o.   Sex: female     DISPOSITION            No diagnosis found.    Medical Decision Making     34-year-old lady presents with hematemesis she has a history of esophageal varices reportedly, but syndrome sounds more like a Indira-Ibrahim tear.  Will check labs, start with some IV Pepcid Reglan and Levsin and then reassess.     1 acute complicated illness or injury.  {Risk:96584}    I independently ordered and reviewed each unique test.  I reviewed external records: ED visit note from an outside group.  I reviewed external records: provider visit note from PCP.  I reviewed external records: provider visit note from outside specialist.  I reviewed external records: previous lab results from outside ED.  I reviewed external records: previous imaging study including radiologist interpretation.     {test reviewed:03395}  {EK}  {Admitted or Consultants involved.:14439}  {MIPS URI - Strep - Sinusitis - Pregnant - Head Trauma - Overdose - Agitation:88063}  {SEP1 yes/no:29527}  {Critical Care:37167}    History     34-year-old lady presents to the ER for hematemesis.  Onset about an hour ago.  She estimates she is thrown up maybe 8 times.  The first 2 or 3 were productive of just clear to yellow mucus and then there was blood mixed in with the mucus for the following 5 or 6 times.    She has no pain.  Patient states she has a history of esophageal varices.  There is no dizziness no lightheadedness.    Patient just started a 10-day course of penicillin for dental infection, and was told by the urgent care prescribing it, to go to the ER immediately should anything change.  Patient does have a prior history of some GI bleeding at least 1 of which was associated with a time when she had a TIA.  Patient was on famotidine then (but not now), currently takes no blood thinners, and hardly ever needs antacids for reflux.        ROS     Review of  Violence: Not At Risk (6/2/2024)    Received from [facility]    Humiliation, Afraid, Rape, and Kick questionnaire     Fear of Current or Ex-Partner: No     Emotionally Abused: No     Physically Abused: No     Sexually Abused: No   Housing Stability: High Risk (6/2/2024)    Received from [facility]    Housing Stability Vital Sign     Unable to Pay for Housing in the Last Year: Yes     Homeless in the Last Year: No        Previous Medications    CHLORDIAZEPOXIDE (LIBRIUM) 10 MG CAPSULE        CREON 53108-996279 UNITS CPEP DELAYED RELEASE CAPSULE    TAKE 2 CAPSULES BY MOUTH 3 TIMES DAILY WITH MEALS. TAKE 1 CAPSULE BY MOUTH WITH SNACKS.    DULOXETINE (CYMBALTA) 60 MG EXTENDED RELEASE CAPSULE    Take 60 mg by mouth every evening    FOLIC ACID PO    Take by mouth    GABAPENTIN (NEURONTIN) 300 MG CAPSULE    Take 1 capsule by mouth 3 times daily.    MAGNESIUM PO    Take by mouth    METOPROLOL TARTRATE (LOPRESSOR) 25 MG TABLET    Take 1 tablet by mouth 2 times daily    MIRABEGRON (MYRBETRIQ) 50 MG TB24    Take 50 mg by mouth daily    ONDANSETRON (ZOFRAN) 8 MG TABLET    Take 1 tablet by mouth every 8 hours as needed    PANTOPRAZOLE (PROTONIX) 40 MG TABLET    Take 1 tablet by mouth daily    TIZANIDINE (ZANAFLEX) 4 MG TABLET    TAKE 1 TABLET BY MOUTH EVERY 8 HOURS AS NEEDED FOR UP TO 10 DAYS.    VITAMIN D 25 MCG (1000 UT) CAPS    Take by mouth daily        No results found for any visits on 11/27/24.      No orders to display                No results for input(s): \"COVID19\" in the last 72 hours.    Voice dictation software was used during the making of this note.  This software is not perfect and grammatical and other typographical errors may be present.  This note has not been completely proofread for errors.        Dawson Gillette MD  12/09/24 0328

## 2024-11-27 NOTE — DISCHARGE INSTRUCTIONS
Take the Prilosec daily for a week, then switch to famotidine daily, instead  Zofran as needed for nausea or vomiting  0 alcohol would be best, but at least wait a week before having anything to drink

## 2024-12-03 ENCOUNTER — APPOINTMENT (OUTPATIENT)
Dept: CT IMAGING | Age: 34
DRG: 394 | End: 2024-12-03
Payer: COMMERCIAL

## 2024-12-03 ENCOUNTER — HOSPITAL ENCOUNTER (INPATIENT)
Age: 34
LOS: 2 days | Discharge: HOME | DRG: 394 | End: 2024-12-06
Attending: STUDENT IN AN ORGANIZED HEALTH CARE EDUCATION/TRAINING PROGRAM | Admitting: INTERNAL MEDICINE
Payer: COMMERCIAL

## 2024-12-03 DIAGNOSIS — R11.2 NAUSEA AND VOMITING, UNSPECIFIED VOMITING TYPE: ICD-10-CM

## 2024-12-03 DIAGNOSIS — D62 ACUTE BLOOD LOSS ANEMIA: Primary | ICD-10-CM

## 2024-12-03 DIAGNOSIS — K92.2 GASTROINTESTINAL HEMORRHAGE, UNSPECIFIED GASTROINTESTINAL HEMORRHAGE TYPE: ICD-10-CM

## 2024-12-03 PROBLEM — R56.9 SEIZURES (HCC): Status: ACTIVE | Noted: 2024-12-03

## 2024-12-03 PROBLEM — K21.9 GERD (GASTROESOPHAGEAL REFLUX DISEASE): Status: ACTIVE | Noted: 2024-12-03

## 2024-12-03 PROBLEM — G62.9 NEUROPATHY: Status: ACTIVE | Noted: 2024-12-03

## 2024-12-03 PROBLEM — G89.29 CHRONIC ABDOMINAL PAIN: Status: ACTIVE | Noted: 2024-12-03

## 2024-12-03 PROBLEM — E66.9 OBESITY: Status: ACTIVE | Noted: 2020-03-18

## 2024-12-03 PROBLEM — E83.42 HYPOMAGNESEMIA: Status: ACTIVE | Noted: 2024-12-03

## 2024-12-03 PROBLEM — D50.9 IRON DEFICIENCY ANEMIA: Status: ACTIVE | Noted: 2024-12-03

## 2024-12-03 PROBLEM — M06.9 RHEUMATOID ARTHRITIS (HCC): Status: ACTIVE | Noted: 2024-12-03

## 2024-12-03 PROBLEM — R10.9 CHRONIC ABDOMINAL PAIN: Status: ACTIVE | Noted: 2024-12-03

## 2024-12-03 PROBLEM — I86.4 GASTRIC VARICES: Status: ACTIVE | Noted: 2024-12-03

## 2024-12-03 PROBLEM — F31.9 BIPOLAR DISORDER (HCC): Status: ACTIVE | Noted: 2024-12-03

## 2024-12-03 PROBLEM — E87.6 HYPOKALEMIA: Status: ACTIVE | Noted: 2024-12-03

## 2024-12-03 PROBLEM — Z87.898 HISTORY OF ALCOHOL USE: Status: ACTIVE | Noted: 2024-12-03

## 2024-12-03 PROBLEM — K86.1 CHRONIC PANCREATITIS (HCC): Status: ACTIVE | Noted: 2024-12-03

## 2024-12-03 PROBLEM — R79.89 ELEVATED LFTS: Status: ACTIVE | Noted: 2024-12-03

## 2024-12-03 LAB
ALBUMIN SERPL-MCNC: 3.8 G/DL (ref 3.5–5)
ALBUMIN/GLOB SERPL: 0.9 (ref 1–1.9)
ALP SERPL-CCNC: 139 U/L (ref 35–104)
ALT SERPL-CCNC: 48 U/L (ref 8–45)
ANION GAP SERPL CALC-SCNC: 13 MMOL/L (ref 7–16)
APPEARANCE UR: ABNORMAL
APTT PPP: 31.2 SEC (ref 23.3–37.4)
AST SERPL-CCNC: 129 U/L (ref 15–37)
BACTERIA URNS QL MICRO: ABNORMAL /HPF
BASOPHILS # BLD: 0 K/UL (ref 0–0.2)
BASOPHILS NFR BLD: 0 % (ref 0–2)
BILIRUB SERPL-MCNC: 3.5 MG/DL (ref 0–1.2)
BILIRUB UR QL: ABNORMAL
BILIRUB UR QL: ABNORMAL
BUN SERPL-MCNC: 3 MG/DL (ref 6–23)
CALCIUM SERPL-MCNC: 9.6 MG/DL (ref 8.8–10.2)
CHLORIDE SERPL-SCNC: 102 MMOL/L (ref 98–107)
CO2 SERPL-SCNC: 29 MMOL/L (ref 20–29)
COLOR UR: ABNORMAL
CREAT SERPL-MCNC: 0.71 MG/DL (ref 0.6–1.1)
DIFFERENTIAL METHOD BLD: ABNORMAL
EOSINOPHIL # BLD: 0 K/UL (ref 0–0.8)
EOSINOPHIL NFR BLD: 0 % (ref 0.5–7.8)
EPI CELLS #/AREA URNS HPF: ABNORMAL /HPF
ERYTHROCYTE [DISTWIDTH] IN BLOOD BY AUTOMATED COUNT: 18.9 % (ref 11.9–14.6)
GLOBULIN SER CALC-MCNC: 4.2 G/DL (ref 2.3–3.5)
GLUCOSE SERPL-MCNC: 144 MG/DL (ref 70–99)
GLUCOSE UR QL STRIP.AUTO: 100 MG/DL
GLUCOSE UR STRIP.AUTO-MCNC: NEGATIVE MG/DL
HCG UR QL: NEGATIVE
HCT VFR BLD AUTO: 29.6 % (ref 35.8–46.3)
HCT VFR BLD AUTO: 31.2 % (ref 35.8–46.3)
HGB BLD-MCNC: 10.3 G/DL (ref 11.7–15.4)
HGB BLD-MCNC: 9.9 G/DL (ref 11.7–15.4)
HGB UR QL STRIP: NEGATIVE
IMM GRANULOCYTES # BLD AUTO: 0.1 K/UL (ref 0–0.5)
IMM GRANULOCYTES NFR BLD AUTO: 1 % (ref 0–5)
INR PPP: 1.4
KETONES UR QL STRIP.AUTO: 40 MG/DL
KETONES UR-MCNC: 80 MG/DL
LACTATE SERPL-SCNC: 1 MMOL/L (ref 0.5–2)
LEUKOCYTE ESTERASE UR QL STRIP.AUTO: ABNORMAL
LEUKOCYTE ESTERASE UR QL STRIP: NEGATIVE
LIPASE SERPL-CCNC: 8 U/L (ref 13–60)
LYMPHOCYTES # BLD: 2.3 K/UL (ref 0.5–4.6)
LYMPHOCYTES NFR BLD: 26 % (ref 13–44)
MCH RBC QN AUTO: 31 PG (ref 26.1–32.9)
MCHC RBC AUTO-ENTMCNC: 33.4 G/DL (ref 31.4–35)
MCV RBC AUTO: 92.8 FL (ref 82–102)
MONOCYTES # BLD: 0.7 K/UL (ref 0.1–1.3)
MONOCYTES NFR BLD: 8 % (ref 4–12)
MUCOUS THREADS URNS QL MICRO: ABNORMAL /LPF
NEUTS SEG # BLD: 5.9 K/UL (ref 1.7–8.2)
NEUTS SEG NFR BLD: 66 % (ref 43–78)
NITRITE UR QL STRIP.AUTO: POSITIVE
NITRITE UR QL: POSITIVE
NRBC # BLD: 0.03 K/UL (ref 0–0.2)
PH UR STRIP: 7 (ref 5–9)
PH UR: 7 (ref 5–9)
PLATELET # BLD AUTO: 226 K/UL (ref 150–450)
PMV BLD AUTO: 11 FL (ref 9.4–12.3)
POTASSIUM SERPL-SCNC: 3.3 MMOL/L (ref 3.5–5.1)
PROT SERPL-MCNC: 8 G/DL (ref 6.3–8.2)
PROT UR QL: 100 MG/DL
PROT UR STRIP-MCNC: 100 MG/DL
PROTHROMBIN TIME: 17.3 SEC (ref 11.3–14.9)
RBC # BLD AUTO: 3.19 M/UL (ref 4.05–5.2)
RBC # UR STRIP: NEGATIVE
RBC #/AREA URNS HPF: ABNORMAL /HPF
SERVICE CMNT-IMP: ABNORMAL
SODIUM SERPL-SCNC: 144 MMOL/L (ref 136–145)
SP GR UR REFRACTOMETRY: >1.035 (ref 1–1.02)
SP GR UR: 1.02 (ref 1–1.02)
UROBILINOGEN UR QL STRIP.AUTO: 1 EU/DL (ref 0.2–1)
UROBILINOGEN UR QL: >=8 EU/DL (ref 0.2–1)
WBC # BLD AUTO: 9 K/UL (ref 4.3–11.1)
WBC URNS QL MICRO: ABNORMAL /HPF

## 2024-12-03 PROCEDURE — 6370000000 HC RX 637 (ALT 250 FOR IP): Performed by: INTERNAL MEDICINE

## 2024-12-03 PROCEDURE — 81025 URINE PREGNANCY TEST: CPT

## 2024-12-03 PROCEDURE — 85610 PROTHROMBIN TIME: CPT

## 2024-12-03 PROCEDURE — 2580000003 HC RX 258: Performed by: EMERGENCY MEDICINE

## 2024-12-03 PROCEDURE — 6360000002 HC RX W HCPCS: Performed by: EMERGENCY MEDICINE

## 2024-12-03 PROCEDURE — 2580000003 HC RX 258: Performed by: STUDENT IN AN ORGANIZED HEALTH CARE EDUCATION/TRAINING PROGRAM

## 2024-12-03 PROCEDURE — 85025 COMPLETE CBC W/AUTO DIFF WBC: CPT

## 2024-12-03 PROCEDURE — 36415 COLL VENOUS BLD VENIPUNCTURE: CPT

## 2024-12-03 PROCEDURE — G0378 HOSPITAL OBSERVATION PER HR: HCPCS

## 2024-12-03 PROCEDURE — 83605 ASSAY OF LACTIC ACID: CPT

## 2024-12-03 PROCEDURE — 85018 HEMOGLOBIN: CPT

## 2024-12-03 PROCEDURE — 85014 HEMATOCRIT: CPT

## 2024-12-03 PROCEDURE — 6360000002 HC RX W HCPCS: Performed by: STUDENT IN AN ORGANIZED HEALTH CARE EDUCATION/TRAINING PROGRAM

## 2024-12-03 PROCEDURE — 85730 THROMBOPLASTIN TIME PARTIAL: CPT

## 2024-12-03 PROCEDURE — 96375 TX/PRO/DX INJ NEW DRUG ADDON: CPT

## 2024-12-03 PROCEDURE — 80053 COMPREHEN METABOLIC PANEL: CPT

## 2024-12-03 PROCEDURE — 96374 THER/PROPH/DIAG INJ IV PUSH: CPT

## 2024-12-03 PROCEDURE — 6360000004 HC RX CONTRAST MEDICATION: Performed by: STUDENT IN AN ORGANIZED HEALTH CARE EDUCATION/TRAINING PROGRAM

## 2024-12-03 PROCEDURE — 2580000003 HC RX 258: Performed by: FAMILY MEDICINE

## 2024-12-03 PROCEDURE — 74178 CT ABD&PLV WO CNTR FLWD CNTR: CPT

## 2024-12-03 PROCEDURE — 6370000000 HC RX 637 (ALT 250 FOR IP): Performed by: EMERGENCY MEDICINE

## 2024-12-03 PROCEDURE — 81003 URINALYSIS AUTO W/O SCOPE: CPT

## 2024-12-03 PROCEDURE — 83690 ASSAY OF LIPASE: CPT

## 2024-12-03 PROCEDURE — 2580000003 HC RX 258: Performed by: INTERNAL MEDICINE

## 2024-12-03 PROCEDURE — 99285 EMERGENCY DEPT VISIT HI MDM: CPT

## 2024-12-03 PROCEDURE — 81001 URINALYSIS AUTO W/SCOPE: CPT

## 2024-12-03 PROCEDURE — 6370000000 HC RX 637 (ALT 250 FOR IP): Performed by: STUDENT IN AN ORGANIZED HEALTH CARE EDUCATION/TRAINING PROGRAM

## 2024-12-03 RX ORDER — LEVETIRACETAM 100 MG/ML
10 SOLUTION ORAL 2 TIMES DAILY
COMMUNITY

## 2024-12-03 RX ORDER — SUMATRIPTAN SUCCINATE 25 MG/1
25 TABLET ORAL
COMMUNITY

## 2024-12-03 RX ORDER — HYDROXYCHLOROQUINE SULFATE 200 MG/1
TABLET, FILM COATED ORAL DAILY
COMMUNITY

## 2024-12-03 RX ORDER — POTASSIUM CHLORIDE 1500 MG/1
40 TABLET, EXTENDED RELEASE ORAL ONCE
Status: COMPLETED | OUTPATIENT
Start: 2024-12-03 | End: 2024-12-03

## 2024-12-03 RX ORDER — ARIPIPRAZOLE 10 MG/1
10 TABLET ORAL DAILY
Status: DISCONTINUED | OUTPATIENT
Start: 2024-12-03 | End: 2024-12-06 | Stop reason: HOSPADM

## 2024-12-03 RX ORDER — ACETAMINOPHEN 325 MG/1
650 TABLET ORAL EVERY 6 HOURS PRN
Status: DISCONTINUED | OUTPATIENT
Start: 2024-12-03 | End: 2024-12-06 | Stop reason: HOSPADM

## 2024-12-03 RX ORDER — MORPHINE SULFATE 4 MG/ML
4 INJECTION, SOLUTION INTRAMUSCULAR; INTRAVENOUS ONCE
Status: COMPLETED | OUTPATIENT
Start: 2024-12-03 | End: 2024-12-03

## 2024-12-03 RX ORDER — POTASSIUM CHLORIDE 1500 MG/1
40 TABLET, EXTENDED RELEASE ORAL PRN
Status: DISCONTINUED | OUTPATIENT
Start: 2024-12-03 | End: 2024-12-06 | Stop reason: HOSPADM

## 2024-12-03 RX ORDER — ONDANSETRON 4 MG/1
4 TABLET, ORALLY DISINTEGRATING ORAL EVERY 8 HOURS PRN
Status: DISCONTINUED | OUTPATIENT
Start: 2024-12-03 | End: 2024-12-06 | Stop reason: HOSPADM

## 2024-12-03 RX ORDER — 0.9 % SODIUM CHLORIDE 0.9 %
500 INTRAVENOUS SOLUTION INTRAVENOUS ONCE
Status: COMPLETED | OUTPATIENT
Start: 2024-12-03 | End: 2024-12-04

## 2024-12-03 RX ORDER — POTASSIUM CHLORIDE 7.45 MG/ML
10 INJECTION INTRAVENOUS PRN
Status: DISCONTINUED | OUTPATIENT
Start: 2024-12-03 | End: 2024-12-06 | Stop reason: HOSPADM

## 2024-12-03 RX ORDER — CEPHALEXIN 500 MG/1
500 CAPSULE ORAL 4 TIMES DAILY
COMMUNITY

## 2024-12-03 RX ORDER — SODIUM CHLORIDE 9 MG/ML
INJECTION, SOLUTION INTRAVENOUS PRN
Status: DISCONTINUED | OUTPATIENT
Start: 2024-12-03 | End: 2024-12-06 | Stop reason: HOSPADM

## 2024-12-03 RX ORDER — POLYETHYLENE GLYCOL 3350 17 G/17G
17 POWDER, FOR SOLUTION ORAL DAILY PRN
Status: DISCONTINUED | OUTPATIENT
Start: 2024-12-03 | End: 2024-12-06 | Stop reason: HOSPADM

## 2024-12-03 RX ORDER — TIZANIDINE 2 MG/1
4 TABLET ORAL 2 TIMES DAILY PRN
Status: DISCONTINUED | OUTPATIENT
Start: 2024-12-03 | End: 2024-12-06 | Stop reason: HOSPADM

## 2024-12-03 RX ORDER — 0.9 % SODIUM CHLORIDE 0.9 %
500 INTRAVENOUS SOLUTION INTRAVENOUS ONCE
Status: COMPLETED | OUTPATIENT
Start: 2024-12-03 | End: 2024-12-03

## 2024-12-03 RX ORDER — HYDROCODONE BITARTRATE AND ACETAMINOPHEN 5; 325 MG/1; MG/1
1 TABLET ORAL EVERY 6 HOURS PRN
COMMUNITY

## 2024-12-03 RX ORDER — FAMOTIDINE 20 MG/1
20 TABLET, FILM COATED ORAL 2 TIMES DAILY
COMMUNITY

## 2024-12-03 RX ORDER — FERROUS SULFATE 325(65) MG
325 TABLET ORAL 2 TIMES DAILY
COMMUNITY

## 2024-12-03 RX ORDER — PROMETHAZINE HYDROCHLORIDE 25 MG/1
25 TABLET ORAL EVERY 6 HOURS PRN
COMMUNITY

## 2024-12-03 RX ORDER — PROMETHAZINE HYDROCHLORIDE 12.5 MG/1
12.5 TABLET ORAL EVERY 6 HOURS PRN
Status: DISCONTINUED | OUTPATIENT
Start: 2024-12-03 | End: 2024-12-06 | Stop reason: HOSPADM

## 2024-12-03 RX ORDER — ONDANSETRON 2 MG/ML
4 INJECTION INTRAMUSCULAR; INTRAVENOUS
Status: COMPLETED | OUTPATIENT
Start: 2024-12-03 | End: 2024-12-03

## 2024-12-03 RX ORDER — DIPHENHYDRAMINE HCL 25 MG
25 CAPSULE ORAL EVERY 6 HOURS PRN
COMMUNITY

## 2024-12-03 RX ORDER — PROPRANOLOL HYDROCHLORIDE 10 MG/1
20 TABLET ORAL DAILY
Status: DISCONTINUED | OUTPATIENT
Start: 2024-12-03 | End: 2024-12-06 | Stop reason: HOSPADM

## 2024-12-03 RX ORDER — ACETAMINOPHEN 650 MG/1
650 SUPPOSITORY RECTAL EVERY 6 HOURS PRN
Status: DISCONTINUED | OUTPATIENT
Start: 2024-12-03 | End: 2024-12-06 | Stop reason: HOSPADM

## 2024-12-03 RX ORDER — TRAMADOL HYDROCHLORIDE 50 MG/1
50 TABLET ORAL EVERY 8 HOURS PRN
Status: DISCONTINUED | OUTPATIENT
Start: 2024-12-03 | End: 2024-12-06 | Stop reason: HOSPADM

## 2024-12-03 RX ORDER — SODIUM CHLORIDE 0.9 % (FLUSH) 0.9 %
5-40 SYRINGE (ML) INJECTION PRN
Status: DISCONTINUED | OUTPATIENT
Start: 2024-12-03 | End: 2024-12-06 | Stop reason: HOSPADM

## 2024-12-03 RX ORDER — DIPHENHYDRAMINE HYDROCHLORIDE 50 MG/ML
25 INJECTION INTRAMUSCULAR; INTRAVENOUS
Status: COMPLETED | OUTPATIENT
Start: 2024-12-03 | End: 2024-12-03

## 2024-12-03 RX ORDER — MENTHOL/CAMPHOR/ALLANTOIN/PHE 0.6-0.5-1%
OINTMENT(EA) TOPICAL PRN
Status: DISCONTINUED | OUTPATIENT
Start: 2024-12-03 | End: 2024-12-06 | Stop reason: HOSPADM

## 2024-12-03 RX ORDER — IOPAMIDOL 755 MG/ML
100 INJECTION, SOLUTION INTRAVASCULAR
Status: COMPLETED | OUTPATIENT
Start: 2024-12-03 | End: 2024-12-03

## 2024-12-03 RX ORDER — PANTOPRAZOLE SODIUM 40 MG/1
40 TABLET, DELAYED RELEASE ORAL
Status: DISCONTINUED | OUTPATIENT
Start: 2024-12-03 | End: 2024-12-06 | Stop reason: HOSPADM

## 2024-12-03 RX ORDER — ONDANSETRON 2 MG/ML
4 INJECTION INTRAMUSCULAR; INTRAVENOUS EVERY 6 HOURS PRN
Status: DISCONTINUED | OUTPATIENT
Start: 2024-12-03 | End: 2024-12-06 | Stop reason: HOSPADM

## 2024-12-03 RX ORDER — SODIUM CHLORIDE 0.9 % (FLUSH) 0.9 %
5-40 SYRINGE (ML) INJECTION EVERY 12 HOURS SCHEDULED
Status: DISCONTINUED | OUTPATIENT
Start: 2024-12-03 | End: 2024-12-06 | Stop reason: HOSPADM

## 2024-12-03 RX ORDER — PREGABALIN 75 MG/1
75 CAPSULE ORAL 2 TIMES DAILY
COMMUNITY

## 2024-12-03 RX ORDER — HYDROXYCHLOROQUINE SULFATE 200 MG/1
200 TABLET, FILM COATED ORAL 2 TIMES DAILY
Status: DISCONTINUED | OUTPATIENT
Start: 2024-12-03 | End: 2024-12-06 | Stop reason: HOSPADM

## 2024-12-03 RX ORDER — DIPHENHYDRAMINE HCL 25 MG
25 CAPSULE ORAL
Status: COMPLETED | OUTPATIENT
Start: 2024-12-03 | End: 2024-12-03

## 2024-12-03 RX ORDER — LANOLIN ALCOHOL/MO/W.PET/CERES
400 CREAM (GRAM) TOPICAL DAILY
Status: DISCONTINUED | OUTPATIENT
Start: 2024-12-04 | End: 2024-12-06 | Stop reason: HOSPADM

## 2024-12-03 RX ORDER — PREGABALIN 75 MG/1
75 CAPSULE ORAL 2 TIMES DAILY
Status: DISCONTINUED | OUTPATIENT
Start: 2024-12-03 | End: 2024-12-06 | Stop reason: HOSPADM

## 2024-12-03 RX ORDER — LEVETIRACETAM 250 MG/1
250 TABLET ORAL 2 TIMES DAILY
Status: DISCONTINUED | OUTPATIENT
Start: 2024-12-03 | End: 2024-12-06 | Stop reason: HOSPADM

## 2024-12-03 RX ORDER — MAGNESIUM SULFATE IN WATER 40 MG/ML
2000 INJECTION, SOLUTION INTRAVENOUS PRN
Status: DISCONTINUED | OUTPATIENT
Start: 2024-12-03 | End: 2024-12-06 | Stop reason: HOSPADM

## 2024-12-03 RX ADMIN — PREGABALIN 75 MG: 75 CAPSULE ORAL at 22:27

## 2024-12-03 RX ADMIN — SODIUM CHLORIDE 40 MG: 9 INJECTION, SOLUTION INTRAMUSCULAR; INTRAVENOUS; SUBCUTANEOUS at 07:02

## 2024-12-03 RX ADMIN — DIPHENHYDRAMINE HYDROCHLORIDE 25 MG: 25 CAPSULE ORAL at 06:49

## 2024-12-03 RX ADMIN — SODIUM CHLORIDE, PRESERVATIVE FREE 10 ML: 5 INJECTION INTRAVENOUS at 13:56

## 2024-12-03 RX ADMIN — HYOSCYAMINE SULFATE 0.25 MG: 0.12 TABLET ORAL; SUBLINGUAL at 10:32

## 2024-12-03 RX ADMIN — PROPRANOLOL HYDROCHLORIDE 20 MG: 10 TABLET ORAL at 13:55

## 2024-12-03 RX ADMIN — OLANZAPINE 5 MG: 10 INJECTION, POWDER, FOR SOLUTION INTRAMUSCULAR at 10:41

## 2024-12-03 RX ADMIN — DIPHENHYDRAMINE HYDROCHLORIDE 25 MG: 50 INJECTION INTRAMUSCULAR; INTRAVENOUS at 10:36

## 2024-12-03 RX ADMIN — IOPAMIDOL 100 ML: 755 INJECTION, SOLUTION INTRAVENOUS at 08:17

## 2024-12-03 RX ADMIN — POTASSIUM CHLORIDE 40 MEQ: 1500 TABLET, EXTENDED RELEASE ORAL at 13:55

## 2024-12-03 RX ADMIN — ONDANSETRON 4 MG: 2 INJECTION, SOLUTION INTRAMUSCULAR; INTRAVENOUS at 06:12

## 2024-12-03 RX ADMIN — MORPHINE SULFATE 4 MG: 4 INJECTION, SOLUTION INTRAMUSCULAR; INTRAVENOUS at 07:32

## 2024-12-03 RX ADMIN — SODIUM CHLORIDE, PRESERVATIVE FREE 10 ML: 5 INJECTION INTRAVENOUS at 22:32

## 2024-12-03 RX ADMIN — SODIUM CHLORIDE 500 ML: 9 INJECTION, SOLUTION INTRAVENOUS at 22:58

## 2024-12-03 RX ADMIN — SODIUM CHLORIDE 500 ML: 9 INJECTION, SOLUTION INTRAVENOUS at 20:08

## 2024-12-03 RX ADMIN — Medication 7 G: at 13:56

## 2024-12-03 RX ADMIN — LEVETIRACETAM 250 MG: 250 TABLET, FILM COATED ORAL at 22:30

## 2024-12-03 RX ADMIN — HYDROXYCHLOROQUINE SULFATE 200 MG: 200 TABLET ORAL at 22:26

## 2024-12-03 RX ADMIN — PANTOPRAZOLE SODIUM 40 MG: 40 TABLET, DELAYED RELEASE ORAL at 18:23

## 2024-12-03 RX ADMIN — TRAMADOL HYDROCHLORIDE 50 MG: 50 TABLET ORAL at 18:23

## 2024-12-03 RX ADMIN — CEFTRIAXONE 1000 MG: 1 INJECTION, POWDER, FOR SOLUTION INTRAMUSCULAR; INTRAVENOUS at 08:29

## 2024-12-03 ASSESSMENT — PAIN - FUNCTIONAL ASSESSMENT: PAIN_FUNCTIONAL_ASSESSMENT: 0-10

## 2024-12-03 ASSESSMENT — ENCOUNTER SYMPTOMS
BLOOD IN STOOL: 1
WHEEZING: 0
VOMITING: 1
SHORTNESS OF BREATH: 0
EYE DISCHARGE: 0
NAUSEA: 1
ABDOMINAL PAIN: 0
SORE THROAT: 0
EYE PAIN: 0

## 2024-12-03 ASSESSMENT — PAIN DESCRIPTION - LOCATION
LOCATION: MOUTH
LOCATION: BACK
LOCATION: MOUTH
LOCATION: ABDOMEN
LOCATION: ABDOMEN

## 2024-12-03 ASSESSMENT — PAIN SCALES - GENERAL
PAINLEVEL_OUTOF10: 6
PAINLEVEL_OUTOF10: 7
PAINLEVEL_OUTOF10: 8
PAINLEVEL_OUTOF10: 9

## 2024-12-03 ASSESSMENT — LIFESTYLE VARIABLES
HOW MANY STANDARD DRINKS CONTAINING ALCOHOL DO YOU HAVE ON A TYPICAL DAY: 1 OR 2
HOW OFTEN DO YOU HAVE A DRINK CONTAINING ALCOHOL: 2-4 TIMES A MONTH

## 2024-12-03 ASSESSMENT — PAIN DESCRIPTION - ORIENTATION: ORIENTATION: LEFT

## 2024-12-03 NOTE — ED PROVIDER NOTES
Emergency Department Provider Note       PCP: Randall Douglas Jr., MD   Age: 34 y.o.   Sex: female     DISPOSITION Admitted 12/03/2024 11:39:07 AM    ICD-10-CM    1. Acute blood loss anemia  D62       2. Nausea and vomiting, unspecified vomiting type  R11.2       3. Gastrointestinal hemorrhage, unspecified gastrointestinal hemorrhage type  K92.2           Medical Decision Making     34-year-old female presents ED for nausea/vomiting.  Vital signs within normal limits.  Physical exam unremarkable, she is deferring rectal exam at this time.  Does report hematochezia over the course of last week for about 4 days, this is largely resolved aside from 1 bloody bowel movement yesterday.  Not having any active bleeding that she reports.  Hemoglobin did have a drop from 13.4 to 9.9 over the course of the week.  Will obtain lactic acid and CT angiogram of abdomen/pelvis.    6:50 AM EST The patient's care will be transitioned to Dr. Gillette.  At this time, the plan is pending remaining labs and imaging.  Please see that provider's documentation for further information.         1 or more acute illnesses that pose a threat to life or bodily function.   Shared medical decision making was utilized in creating the patients health plan today.  I independently ordered and reviewed each unique test.    I reviewed external records: ED visit note from an outside group.  I reviewed external records: provider visit note from PCP.  I reviewed external records: previous lab results from outside ED.                     History     Ms. Malik is a 34-year-old female with PMH of frequent nausea/vomiting, chronic pancreatitis who presents to the ED for nausea/vomiting.  Patient states that she has been having multiple episodes of emesis for the past 2 days.  No blood noted in the vomit.  States that she was recently transition from penicillin to Keflex due to dental infection that she is scheduled to have tooth extracted later this week.

## 2024-12-03 NOTE — ED TRIAGE NOTES
From home via EMS to ED, pt has infected tooth, place on ATB last Wednesday, starting on Sunday started throwing up. No emesis en route. Not able to take meds due to throwing up the last few days.

## 2024-12-03 NOTE — ED NOTES
TRANSFER - OUT REPORT:    Verbal report given to LAWRENCE Herrera on Roslyn Malik  being transferred to [facility] for routine progression of patient care       Report consisted of patient's Situation, Background, Assessment and   Recommendations(SBAR).     Information from the following report(s) Nurse Handoff Report, ED Encounter Summary, ED SBAR, Adult Overview, MAR, Recent Results, Neuro Assessment, and Event Log was reviewed with the receiving nurse.    Lines:   Peripheral IV 12/03/24 Left Forearm (Active)   Site Assessment Clean, dry & intact 12/03/24 0728   Line Status Blood return noted 12/03/24 0728   Phlebitis Assessment No symptoms 12/03/24 0728   Infiltration Assessment 0 12/03/24 0728        Opportunity for questions and clarification was provided.      Patient transported with:  Transport

## 2024-12-03 NOTE — ED NOTES
CTA from this morning not showing in patient's results - pending or otherwise.   This RN called and spoke with Cecilia in CT regarding CTA not displaying in pt results. Cecilia stated Radiologist Ja Hernandez was reviewing the CTA at this time and to contact him if the results were not displaying in the next 10-15mins. Ja S - [phone].

## 2024-12-03 NOTE — H&P
pain  Nausea/Vomiting  - She does have multiple issues that can be causing the symptoms and does have chronic abdominal pain as well as nausea vomiting at baseline  - CTA was completed for possible GI bleed and showed no active bleeding and really no acute issues  - From GI as last office note it seems like she has had a pretty extensive workup for these issues  - Will continue symptomatic control as of now and start a clear liquid diet  - If she remains stable can likely discharge in the next day or so      Iron def. Anemia  Acute on chronic anemia versus chronic normocytic anemia  Hematochezia with a history of hematochezia and GI bleed  History of gastric varices with GERD  -Has had multiple EGDs and colonoscopies as well as sigmoidoscopies and EBUS  - Hemoglobin in the emergency room was 9.9 and it looks like from her records on 11/27 it was 13.4 blood from her primary care office on 11/8 it was 9.9 and from the GI office the month before it was 10.4 so unsure if 13.4 is a falsely elevated reading and she really is at her baseline of 9 or 10 and  - For now we will check another H&H this afternoon and will monitor for any active bleeding  - She remains hemodynamically stable  - If she does have any episodes of bleeding or her hemoglobin drops significantly we will consult GI but from the past [facility] note she now sees 3 different gastroenterology specialist so certainly she does not need to see a fourth if she stays  -Will continue home PPI      Seizures  -Reports she sees neurologist for this and does take Keppra  - Is not on her home med list but have asked nursing to verify so we can continue      Fatty Liver Disease  Chronically elevated LFTs  Chronic pancreatitis with pseudocyst  Lipase is normal  - LFTs are a bit elevated but are actually better than they were a few days ago  - Will hydrate and recheck in the morning  - Does have at least 2 hospitalizations this year for acute pancreatitis but is not

## 2024-12-03 NOTE — ED PROVIDER NOTES
34-year-old female with known history of esophageal varices presents to the ER with abdominal pain, nausea vomiting, hematochezia.  Please see full history and physical by Dr. Joaquin Henry who attended the patient primarily.  Patient has had a 3.5 g drop in her hemoglobin in just the last week plans were to admit her however there was some delay in getting her CT angiogram GI bleed study this CAT scan wound improving nonacute patient is admitted to the hospitalist service     Dawson Gillette MD  12/03/24 7921

## 2024-12-03 NOTE — CARE COORDINATION
Patient from home, indp, insured, established with primary care, and has family support. CMI and Milestones completed.        12/03/24 1050   Service Assessment   Patient Orientation Alert and Oriented;Person;Place;Situation;Self   Cognition Alert   History Provided By Patient   Primary Caregiver Self   Support Systems Family Members;Parent;Friends/Neighbors   PCP Verified by CM Yes   Prior Functional Level Independent in ADLs/IADLs   Current Functional Level Independent in ADLs/IADLs   Can patient return to prior living arrangement Yes   Ability to make needs known: Good   Family able to assist with home care needs: Yes   Would you like for me to discuss the discharge plan with any other family members/significant others, and if so, who? No   Financial Resources Other (Comment)  (Commercial)   Community Resources None   Discharge Planning   Patient expects to be discharged to: Yordan Clark LMSW  Medical Social Worker  [facility]

## 2024-12-04 PROBLEM — K92.2 GI BLEED: Status: ACTIVE | Noted: 2024-12-04

## 2024-12-04 LAB
ALBUMIN SERPL-MCNC: 2.8 G/DL (ref 3.5–5)
ALBUMIN/GLOB SERPL: 0.8 (ref 1–1.9)
ALP SERPL-CCNC: 92 U/L (ref 35–104)
ALT SERPL-CCNC: 37 U/L (ref 8–45)
ANION GAP SERPL CALC-SCNC: 10 MMOL/L (ref 7–16)
AST SERPL-CCNC: 154 U/L (ref 15–37)
BASOPHILS # BLD: 0 K/UL (ref 0–0.2)
BASOPHILS NFR BLD: 0 % (ref 0–2)
BILIRUB SERPL-MCNC: 1.7 MG/DL (ref 0–1.2)
BUN SERPL-MCNC: 3 MG/DL (ref 6–23)
CALCIUM SERPL-MCNC: 8 MG/DL (ref 8.8–10.2)
CHLORIDE SERPL-SCNC: 101 MMOL/L (ref 98–107)
CO2 SERPL-SCNC: 27 MMOL/L (ref 20–29)
CREAT SERPL-MCNC: 0.71 MG/DL (ref 0.6–1.1)
DIFFERENTIAL METHOD BLD: ABNORMAL
EOSINOPHIL # BLD: 0.1 K/UL (ref 0–0.8)
EOSINOPHIL NFR BLD: 1 % (ref 0.5–7.8)
ERYTHROCYTE [DISTWIDTH] IN BLOOD BY AUTOMATED COUNT: 19.5 % (ref 11.9–14.6)
GLOBULIN SER CALC-MCNC: 3.4 G/DL (ref 2.3–3.5)
GLUCOSE SERPL-MCNC: 113 MG/DL (ref 70–99)
HCT VFR BLD AUTO: 24.4 % (ref 35.8–46.3)
HCT VFR BLD AUTO: 25.5 % (ref 35.8–46.3)
HCT VFR BLD AUTO: 30.1 % (ref 35.8–46.3)
HGB BLD-MCNC: 7.9 G/DL (ref 11.7–15.4)
HGB BLD-MCNC: 8.3 G/DL (ref 11.7–15.4)
HGB BLD-MCNC: 9.7 G/DL (ref 11.7–15.4)
IMM GRANULOCYTES # BLD AUTO: 0.1 K/UL (ref 0–0.5)
IMM GRANULOCYTES NFR BLD AUTO: 1 % (ref 0–5)
LYMPHOCYTES # BLD: 3.8 K/UL (ref 0.5–4.6)
LYMPHOCYTES NFR BLD: 51 % (ref 13–44)
MAGNESIUM SERPL-MCNC: 1 MG/DL (ref 1.8–2.4)
MAGNESIUM SERPL-MCNC: 2.1 MG/DL (ref 1.8–2.4)
MCH RBC QN AUTO: 31.1 PG (ref 26.1–32.9)
MCHC RBC AUTO-ENTMCNC: 32.4 G/DL (ref 31.4–35)
MCV RBC AUTO: 96.1 FL (ref 82–102)
MONOCYTES # BLD: 0.4 K/UL (ref 0.1–1.3)
MONOCYTES NFR BLD: 6 % (ref 4–12)
NEUTS SEG # BLD: 3.1 K/UL (ref 1.7–8.2)
NEUTS SEG NFR BLD: 41 % (ref 43–78)
NRBC # BLD: 0.05 K/UL (ref 0–0.2)
PLATELET # BLD AUTO: 141 K/UL (ref 150–450)
PMV BLD AUTO: 11.1 FL (ref 9.4–12.3)
POTASSIUM SERPL-SCNC: 3.3 MMOL/L (ref 3.5–5.1)
PROT SERPL-MCNC: 6.3 G/DL (ref 6.3–8.2)
RBC # BLD AUTO: 2.54 M/UL (ref 4.05–5.2)
SODIUM SERPL-SCNC: 138 MMOL/L (ref 136–145)
WBC # BLD AUTO: 7.5 K/UL (ref 4.3–11.1)

## 2024-12-04 PROCEDURE — 36415 COLL VENOUS BLD VENIPUNCTURE: CPT

## 2024-12-04 PROCEDURE — 85018 HEMOGLOBIN: CPT

## 2024-12-04 PROCEDURE — 6370000000 HC RX 637 (ALT 250 FOR IP): Performed by: INTERNAL MEDICINE

## 2024-12-04 PROCEDURE — 2580000003 HC RX 258: Performed by: INTERNAL MEDICINE

## 2024-12-04 PROCEDURE — 6360000002 HC RX W HCPCS: Performed by: INTERNAL MEDICINE

## 2024-12-04 PROCEDURE — 80177 DRUG SCRN QUAN LEVETIRACETAM: CPT

## 2024-12-04 PROCEDURE — 85025 COMPLETE CBC W/AUTO DIFF WBC: CPT

## 2024-12-04 PROCEDURE — 1100000000 HC RM PRIVATE

## 2024-12-04 PROCEDURE — 85014 HEMATOCRIT: CPT

## 2024-12-04 PROCEDURE — 2580000003 HC RX 258: Performed by: HOSPITALIST

## 2024-12-04 PROCEDURE — 80053 COMPREHEN METABOLIC PANEL: CPT

## 2024-12-04 PROCEDURE — 83735 ASSAY OF MAGNESIUM: CPT

## 2024-12-04 PROCEDURE — 6370000000 HC RX 637 (ALT 250 FOR IP)

## 2024-12-04 RX ORDER — SUCRALFATE 1 G/1
1 TABLET ORAL
Status: DISCONTINUED | OUTPATIENT
Start: 2024-12-04 | End: 2024-12-06 | Stop reason: HOSPADM

## 2024-12-04 RX ORDER — SODIUM CHLORIDE 9 MG/ML
INJECTION, SOLUTION INTRAVENOUS CONTINUOUS
Status: ACTIVE | OUTPATIENT
Start: 2024-12-04 | End: 2024-12-04

## 2024-12-04 RX ORDER — HYDROCORTISONE ACETATE 25 MG/1
25 SUPPOSITORY RECTAL 2 TIMES DAILY
Status: DISCONTINUED | OUTPATIENT
Start: 2024-12-04 | End: 2024-12-06 | Stop reason: HOSPADM

## 2024-12-04 RX ADMIN — HYDROXYCHLOROQUINE SULFATE 200 MG: 200 TABLET ORAL at 20:32

## 2024-12-04 RX ADMIN — SUCRALFATE 1 G: 1 TABLET ORAL at 20:32

## 2024-12-04 RX ADMIN — PROPRANOLOL HYDROCHLORIDE 20 MG: 10 TABLET ORAL at 08:55

## 2024-12-04 RX ADMIN — TRAMADOL HYDROCHLORIDE 50 MG: 50 TABLET ORAL at 15:39

## 2024-12-04 RX ADMIN — TIZANIDINE 4 MG: 2 TABLET ORAL at 20:32

## 2024-12-04 RX ADMIN — POTASSIUM BICARBONATE 40 MEQ: 391 TABLET, EFFERVESCENT ORAL at 10:46

## 2024-12-04 RX ADMIN — Medication 400 MG: at 08:55

## 2024-12-04 RX ADMIN — PREGABALIN 75 MG: 75 CAPSULE ORAL at 20:32

## 2024-12-04 RX ADMIN — LEVETIRACETAM 250 MG: 250 TABLET, FILM COATED ORAL at 20:32

## 2024-12-04 RX ADMIN — SUCRALFATE 1 G: 1 TABLET ORAL at 15:46

## 2024-12-04 RX ADMIN — MAGNESIUM SULFATE HEPTAHYDRATE 2000 MG: 40 INJECTION, SOLUTION INTRAVENOUS at 12:17

## 2024-12-04 RX ADMIN — PANTOPRAZOLE SODIUM 40 MG: 40 TABLET, DELAYED RELEASE ORAL at 15:39

## 2024-12-04 RX ADMIN — SODIUM CHLORIDE: 9 INJECTION, SOLUTION INTRAVENOUS at 10:26

## 2024-12-04 RX ADMIN — TRAMADOL HYDROCHLORIDE 50 MG: 50 TABLET ORAL at 08:55

## 2024-12-04 RX ADMIN — HYDROXYCHLOROQUINE SULFATE 200 MG: 200 TABLET ORAL at 08:55

## 2024-12-04 RX ADMIN — ONDANSETRON 4 MG: 2 INJECTION, SOLUTION INTRAMUSCULAR; INTRAVENOUS at 17:14

## 2024-12-04 RX ADMIN — PREGABALIN 75 MG: 75 CAPSULE ORAL at 08:55

## 2024-12-04 RX ADMIN — SODIUM CHLORIDE: 9 INJECTION, SOLUTION INTRAVENOUS at 20:09

## 2024-12-04 RX ADMIN — CEFTRIAXONE 1000 MG: 1 INJECTION, POWDER, FOR SOLUTION INTRAMUSCULAR; INTRAVENOUS at 10:18

## 2024-12-04 RX ADMIN — LEVETIRACETAM 250 MG: 250 TABLET, FILM COATED ORAL at 08:55

## 2024-12-04 RX ADMIN — MAGNESIUM SULFATE HEPTAHYDRATE 2000 MG: 40 INJECTION, SOLUTION INTRAVENOUS at 10:30

## 2024-12-04 RX ADMIN — PANTOPRAZOLE SODIUM 40 MG: 40 TABLET, DELAYED RELEASE ORAL at 05:18

## 2024-12-04 ASSESSMENT — ENCOUNTER SYMPTOMS
DIARRHEA: 0
ABDOMINAL PAIN: 1
BLOOD IN STOOL: 1
NAUSEA: 1
VOMITING: 1

## 2024-12-04 ASSESSMENT — PAIN DESCRIPTION - LOCATION
LOCATION: BACK
LOCATION: BACK

## 2024-12-04 ASSESSMENT — PAIN SCALES - GENERAL
PAINLEVEL_OUTOF10: 4
PAINLEVEL_OUTOF10: 7
PAINLEVEL_OUTOF10: 7

## 2024-12-04 ASSESSMENT — PAIN DESCRIPTION - ORIENTATION
ORIENTATION: LEFT;LOWER
ORIENTATION: LEFT

## 2024-12-04 NOTE — CONSULTS
Consult Note            Date:12/4/2024        Patient Name:Roslyn Malik     YOB: 1990     Age:34 y.o.    Inpatient consult to GI  Consult performed by: Kath Najera APRN - CNP  Consult ordered by: Juanis Cartagena MD          Chief Complaint     Chief Complaint   Patient presents with    Vomiting    Dental Pain        History Obtained From   patient    History of Present Illness   Roslny Malik is a 33 yo female who presented to the ED with hematochezia and nausea and vomiting. PMH includes seizures, anemia, fatty liver, bipolar, GERD, RA, and chronic pancreatitis. GI was consulted for hematochezia. Patient states since Sunday night she started vomiting consistently up until yesterday. She did not notice any bloody or coffee ground emesis. She also started having blood stools for 4 days last week and then it stopped. On Tuesday of this week she started having blood stools again and has continued to have it her last BM being today. She denies any current nausea or vomiting. She denies diarrhea. She is having some left lower abdominal pain as well as lower back pain. She denies any blood thinners or NSAID use. She denies any alcohol, tobacco, or other drug use. She denies a family history of GI cancer. She denies any recent weight loss but does endorse loss of appetite. She has had several EGD's all which have been normal. She has also had several colonoscopy's all which have been normal besides hemorrhoids. Hgb seems to be around 12-13 and was 9.9 when she came into the ED. She does follow several GI doctors at [facility], as well as [facility].     Labs: Hgb 8.3 (7.9), Hct 25.5 (24.4), Plt 141, Bili 1.7 (3.5), ALT 37 (48),  (129), Alk Phos 92 (139), BUN 3, Creatinine 0.71    Medications: PPI BID    Imaging: CTA ABDOMEN PELVIS GI BLEED Additional Contrast? None    Result Date: 12/3/2024  Unremarkable CTA of the abdomen and pelvis with no evidence of active GI bleeding. Enlarged,

## 2024-12-04 NOTE — PLAN OF CARE
Problem: Discharge Planning  Goal: Discharge to home or other facility with appropriate resources  12/4/2024 0604 by Silvia Coyle RN  Outcome: Progressing  12/3/2024 1826 by Javier Hollins RN  Outcome: Progressing     Problem: Pain  Goal: Verbalizes/displays adequate comfort level or baseline comfort level  12/4/2024 0604 by Silvia Coyle RN  Outcome: Progressing  12/3/2024 1826 by Javier Hollins RN  Outcome: Progressing     Problem: ABCDS Injury Assessment  Goal: Absence of physical injury  12/4/2024 0604 by Silvia Coyle RN  Outcome: Progressing  12/3/2024 1826 by Javier Hollins RN  Outcome: Progressing     Problem: Safety - Adult  Goal: Free from fall injury  12/4/2024 0604 by Silvia Coyle RN  Outcome: Progressing  12/3/2024 1826 by Javier Hollins RN  Outcome: Progressing

## 2024-12-05 LAB
ALBUMIN SERPL-MCNC: 2.5 G/DL (ref 3.5–5)
ALBUMIN/GLOB SERPL: 0.8 (ref 1–1.9)
ALP SERPL-CCNC: 83 U/L (ref 35–104)
ALT SERPL-CCNC: 38 U/L (ref 8–45)
ANION GAP SERPL CALC-SCNC: 9 MMOL/L (ref 7–16)
AST SERPL-CCNC: 152 U/L (ref 15–37)
BASOPHILS # BLD: 0 K/UL (ref 0–0.2)
BASOPHILS NFR BLD: 0 % (ref 0–2)
BILIRUB SERPL-MCNC: 1 MG/DL (ref 0–1.2)
BUN SERPL-MCNC: 3 MG/DL (ref 6–23)
CALCIUM SERPL-MCNC: 7.4 MG/DL (ref 8.8–10.2)
CHLORIDE SERPL-SCNC: 102 MMOL/L (ref 98–107)
CO2 SERPL-SCNC: 25 MMOL/L (ref 20–29)
CREAT SERPL-MCNC: 0.59 MG/DL (ref 0.6–1.1)
DIFFERENTIAL METHOD BLD: ABNORMAL
EOSINOPHIL # BLD: 0.1 K/UL (ref 0–0.8)
EOSINOPHIL NFR BLD: 2 % (ref 0.5–7.8)
ERYTHROCYTE [DISTWIDTH] IN BLOOD BY AUTOMATED COUNT: 19.9 % (ref 11.9–14.6)
GLOBULIN SER CALC-MCNC: 3.3 G/DL (ref 2.3–3.5)
GLUCOSE SERPL-MCNC: 117 MG/DL (ref 70–99)
HCT VFR BLD AUTO: 23 % (ref 35.8–46.3)
HCT VFR BLD AUTO: 23.4 % (ref 35.8–46.3)
HCT VFR BLD AUTO: 24.3 % (ref 35.8–46.3)
HGB BLD-MCNC: 7.5 G/DL (ref 11.7–15.4)
HGB BLD-MCNC: 7.6 G/DL (ref 11.7–15.4)
HGB BLD-MCNC: 8 G/DL (ref 11.7–15.4)
IMM GRANULOCYTES # BLD AUTO: 0.1 K/UL (ref 0–0.5)
IMM GRANULOCYTES NFR BLD AUTO: 2 % (ref 0–5)
LEVETIRACETAM SERPL-MCNC: 8.3 UG/ML (ref 10–40)
LYMPHOCYTES # BLD: 3 K/UL (ref 0.5–4.6)
LYMPHOCYTES NFR BLD: 47 % (ref 13–44)
MCH RBC QN AUTO: 31.4 PG (ref 26.1–32.9)
MCHC RBC AUTO-ENTMCNC: 32.1 G/DL (ref 31.4–35)
MCV RBC AUTO: 97.9 FL (ref 82–102)
MONOCYTES # BLD: 0.4 K/UL (ref 0.1–1.3)
MONOCYTES NFR BLD: 7 % (ref 4–12)
NEUTS SEG # BLD: 2.7 K/UL (ref 1.7–8.2)
NEUTS SEG NFR BLD: 43 % (ref 43–78)
NRBC # BLD: 0.05 K/UL (ref 0–0.2)
PLATELET # BLD AUTO: 133 K/UL (ref 150–450)
PMV BLD AUTO: 10.7 FL (ref 9.4–12.3)
POTASSIUM SERPL-SCNC: 3.6 MMOL/L (ref 3.5–5.1)
PROT SERPL-MCNC: 5.8 G/DL (ref 6.3–8.2)
RBC # BLD AUTO: 2.39 M/UL (ref 4.05–5.2)
SODIUM SERPL-SCNC: 136 MMOL/L (ref 136–145)
WBC # BLD AUTO: 6.3 K/UL (ref 4.3–11.1)

## 2024-12-05 PROCEDURE — 6370000000 HC RX 637 (ALT 250 FOR IP)

## 2024-12-05 PROCEDURE — 80053 COMPREHEN METABOLIC PANEL: CPT

## 2024-12-05 PROCEDURE — 85018 HEMOGLOBIN: CPT

## 2024-12-05 PROCEDURE — 6370000000 HC RX 637 (ALT 250 FOR IP): Performed by: INTERNAL MEDICINE

## 2024-12-05 PROCEDURE — 85014 HEMATOCRIT: CPT

## 2024-12-05 PROCEDURE — 2580000003 HC RX 258: Performed by: INTERNAL MEDICINE

## 2024-12-05 PROCEDURE — 6360000002 HC RX W HCPCS: Performed by: INTERNAL MEDICINE

## 2024-12-05 PROCEDURE — 36415 COLL VENOUS BLD VENIPUNCTURE: CPT

## 2024-12-05 PROCEDURE — 1100000000 HC RM PRIVATE

## 2024-12-05 PROCEDURE — 85025 COMPLETE CBC W/AUTO DIFF WBC: CPT

## 2024-12-05 RX ADMIN — ONDANSETRON 4 MG: 2 INJECTION, SOLUTION INTRAMUSCULAR; INTRAVENOUS at 06:22

## 2024-12-05 RX ADMIN — SUCRALFATE 1 G: 1 TABLET ORAL at 11:34

## 2024-12-05 RX ADMIN — TRAMADOL HYDROCHLORIDE 50 MG: 50 TABLET ORAL at 02:15

## 2024-12-05 RX ADMIN — SODIUM CHLORIDE, PRESERVATIVE FREE 10 ML: 5 INJECTION INTRAVENOUS at 09:06

## 2024-12-05 RX ADMIN — LEVETIRACETAM 250 MG: 250 TABLET, FILM COATED ORAL at 20:05

## 2024-12-05 RX ADMIN — PREGABALIN 75 MG: 75 CAPSULE ORAL at 09:04

## 2024-12-05 RX ADMIN — SODIUM CHLORIDE, PRESERVATIVE FREE 10 ML: 5 INJECTION INTRAVENOUS at 20:05

## 2024-12-05 RX ADMIN — PANTOPRAZOLE SODIUM 40 MG: 40 TABLET, DELAYED RELEASE ORAL at 16:26

## 2024-12-05 RX ADMIN — ONDANSETRON 4 MG: 2 INJECTION, SOLUTION INTRAMUSCULAR; INTRAVENOUS at 20:05

## 2024-12-05 RX ADMIN — Medication 400 MG: at 09:04

## 2024-12-05 RX ADMIN — SUCRALFATE 1 G: 1 TABLET ORAL at 20:05

## 2024-12-05 RX ADMIN — PANTOPRAZOLE SODIUM 40 MG: 40 TABLET, DELAYED RELEASE ORAL at 05:20

## 2024-12-05 RX ADMIN — LEVETIRACETAM 250 MG: 250 TABLET, FILM COATED ORAL at 09:04

## 2024-12-05 RX ADMIN — PROPRANOLOL HYDROCHLORIDE 20 MG: 10 TABLET ORAL at 09:04

## 2024-12-05 RX ADMIN — Medication 6 MG: at 22:52

## 2024-12-05 RX ADMIN — SUCRALFATE 1 G: 1 TABLET ORAL at 05:20

## 2024-12-05 RX ADMIN — CEFTRIAXONE 1000 MG: 1 INJECTION, POWDER, FOR SOLUTION INTRAMUSCULAR; INTRAVENOUS at 09:06

## 2024-12-05 RX ADMIN — HYDROXYCHLOROQUINE SULFATE 200 MG: 200 TABLET ORAL at 09:04

## 2024-12-05 RX ADMIN — HYDROCORTISONE ACETATE 25 MG: 25 SUPPOSITORY RECTAL at 09:06

## 2024-12-05 RX ADMIN — TRAMADOL HYDROCHLORIDE 50 MG: 50 TABLET ORAL at 20:04

## 2024-12-05 RX ADMIN — HYDROXYCHLOROQUINE SULFATE 200 MG: 200 TABLET ORAL at 20:04

## 2024-12-05 RX ADMIN — SUCRALFATE 1 G: 1 TABLET ORAL at 16:26

## 2024-12-05 RX ADMIN — TRAMADOL HYDROCHLORIDE 50 MG: 50 TABLET ORAL at 11:34

## 2024-12-05 RX ADMIN — PREGABALIN 75 MG: 75 CAPSULE ORAL at 20:05

## 2024-12-05 ASSESSMENT — PAIN SCALES - GENERAL
PAINLEVEL_OUTOF10: 6
PAINLEVEL_OUTOF10: 7
PAINLEVEL_OUTOF10: 7
PAINLEVEL_OUTOF10: 4

## 2024-12-05 ASSESSMENT — PAIN DESCRIPTION - LOCATION
LOCATION: BACK
LOCATION: BACK

## 2024-12-05 ASSESSMENT — PAIN DESCRIPTION - ORIENTATION
ORIENTATION: LEFT
ORIENTATION: LEFT

## 2024-12-05 ASSESSMENT — PAIN DESCRIPTION - DESCRIPTORS: DESCRIPTORS: STABBING

## 2024-12-05 NOTE — CARE COORDINATION
Per GI, they recommend treating with conservative measures.  If HGB remains stable, anticipate DC tomorrow with no additional needs.

## 2024-12-06 VITALS
RESPIRATION RATE: 15 BRPM | OXYGEN SATURATION: 95 % | HEART RATE: 63 BPM | SYSTOLIC BLOOD PRESSURE: 103 MMHG | DIASTOLIC BLOOD PRESSURE: 76 MMHG | BODY MASS INDEX: 29.37 KG/M2 | HEIGHT: 64 IN | TEMPERATURE: 97.9 F | WEIGHT: 172 LBS

## 2024-12-06 PROBLEM — D62 ACUTE BLOOD LOSS ANEMIA: Status: ACTIVE | Noted: 2024-12-06

## 2024-12-06 LAB
ANION GAP SERPL CALC-SCNC: 9 MMOL/L (ref 7–16)
BASOPHILS # BLD: 0 K/UL (ref 0–0.2)
BASOPHILS NFR BLD: 0 % (ref 0–2)
BUN SERPL-MCNC: 2 MG/DL (ref 6–23)
CALCIUM SERPL-MCNC: 8.6 MG/DL (ref 8.8–10.2)
CHLORIDE SERPL-SCNC: 103 MMOL/L (ref 98–107)
CO2 SERPL-SCNC: 26 MMOL/L (ref 20–29)
CREAT SERPL-MCNC: 0.58 MG/DL (ref 0.6–1.1)
DIFFERENTIAL METHOD BLD: ABNORMAL
EOSINOPHIL # BLD: 0.1 K/UL (ref 0–0.8)
EOSINOPHIL NFR BLD: 2 % (ref 0.5–7.8)
ERYTHROCYTE [DISTWIDTH] IN BLOOD BY AUTOMATED COUNT: 20.3 % (ref 11.9–14.6)
GLUCOSE SERPL-MCNC: 143 MG/DL (ref 70–99)
HCT VFR BLD AUTO: 24.5 % (ref 35.8–46.3)
HGB BLD-MCNC: 7.8 G/DL (ref 11.7–15.4)
IMM GRANULOCYTES # BLD AUTO: 0.1 K/UL (ref 0–0.5)
IMM GRANULOCYTES NFR BLD AUTO: 1 % (ref 0–5)
LYMPHOCYTES # BLD: 2 K/UL (ref 0.5–4.6)
LYMPHOCYTES NFR BLD: 46 % (ref 13–44)
MCH RBC QN AUTO: 31.7 PG (ref 26.1–32.9)
MCHC RBC AUTO-ENTMCNC: 31.8 G/DL (ref 31.4–35)
MCV RBC AUTO: 99.6 FL (ref 82–102)
MONOCYTES # BLD: 0.4 K/UL (ref 0.1–1.3)
MONOCYTES NFR BLD: 9 % (ref 4–12)
NEUTS SEG # BLD: 1.8 K/UL (ref 1.7–8.2)
NEUTS SEG NFR BLD: 42 % (ref 43–78)
NRBC # BLD: 0.02 K/UL (ref 0–0.2)
PLATELET # BLD AUTO: 135 K/UL (ref 150–450)
PMV BLD AUTO: 10.6 FL (ref 9.4–12.3)
POTASSIUM SERPL-SCNC: 4.1 MMOL/L (ref 3.5–5.1)
RBC # BLD AUTO: 2.46 M/UL (ref 4.05–5.2)
SODIUM SERPL-SCNC: 138 MMOL/L (ref 136–145)
WBC # BLD AUTO: 4.3 K/UL (ref 4.3–11.1)

## 2024-12-06 PROCEDURE — 6370000000 HC RX 637 (ALT 250 FOR IP): Performed by: INTERNAL MEDICINE

## 2024-12-06 PROCEDURE — 85025 COMPLETE CBC W/AUTO DIFF WBC: CPT

## 2024-12-06 PROCEDURE — 36415 COLL VENOUS BLD VENIPUNCTURE: CPT

## 2024-12-06 PROCEDURE — 2580000003 HC RX 258: Performed by: INTERNAL MEDICINE

## 2024-12-06 PROCEDURE — 80048 BASIC METABOLIC PNL TOTAL CA: CPT

## 2024-12-06 PROCEDURE — 6370000000 HC RX 637 (ALT 250 FOR IP)

## 2024-12-06 RX ORDER — PANTOPRAZOLE SODIUM 40 MG/1
40 TABLET, DELAYED RELEASE ORAL
Qty: 60 TABLET | Refills: 0 | Status: SHIPPED | OUTPATIENT
Start: 2024-12-06 | End: 2025-01-05

## 2024-12-06 RX ORDER — ARIPIPRAZOLE 10 MG/1
10 TABLET ORAL DAILY
Qty: 30 TABLET | Refills: 0
Start: 2024-12-07 | End: 2025-01-06

## 2024-12-06 RX ORDER — HYDROCORTISONE ACETATE 25 MG/1
25 SUPPOSITORY RECTAL 2 TIMES DAILY
Qty: 180 SUPPOSITORY | Refills: 0 | Status: SHIPPED | OUTPATIENT
Start: 2024-12-06 | End: 2025-03-06

## 2024-12-06 RX ORDER — SUCRALFATE 1 G/1
1 TABLET ORAL
Qty: 120 TABLET | Refills: 0 | Status: SHIPPED | OUTPATIENT
Start: 2024-12-06 | End: 2025-01-05

## 2024-12-06 RX ORDER — PROPRANOLOL HCL 20 MG
20 TABLET ORAL DAILY
Qty: 30 TABLET | Refills: 0
Start: 2024-12-07 | End: 2025-01-06

## 2024-12-06 RX ORDER — MAGNESIUM OXIDE 400 MG/1
400 TABLET ORAL 2 TIMES DAILY
Qty: 60 TABLET | Refills: 0 | Status: SHIPPED | OUTPATIENT
Start: 2024-12-06 | End: 2025-01-05

## 2024-12-06 RX ADMIN — HYDROCORTISONE ACETATE 25 MG: 25 SUPPOSITORY RECTAL at 08:21

## 2024-12-06 RX ADMIN — HYDROXYCHLOROQUINE SULFATE 200 MG: 200 TABLET ORAL at 08:20

## 2024-12-06 RX ADMIN — ARIPIPRAZOLE 10 MG: 10 TABLET ORAL at 08:20

## 2024-12-06 RX ADMIN — SUCRALFATE 1 G: 1 TABLET ORAL at 05:11

## 2024-12-06 RX ADMIN — PREGABALIN 75 MG: 75 CAPSULE ORAL at 08:20

## 2024-12-06 RX ADMIN — PROPRANOLOL HYDROCHLORIDE 20 MG: 10 TABLET ORAL at 08:20

## 2024-12-06 RX ADMIN — TRAMADOL HYDROCHLORIDE 50 MG: 50 TABLET ORAL at 08:36

## 2024-12-06 RX ADMIN — PANTOPRAZOLE SODIUM 40 MG: 40 TABLET, DELAYED RELEASE ORAL at 05:11

## 2024-12-06 RX ADMIN — LEVETIRACETAM 250 MG: 250 TABLET, FILM COATED ORAL at 08:19

## 2024-12-06 RX ADMIN — Medication 400 MG: at 08:20

## 2024-12-06 RX ADMIN — SUCRALFATE 1 G: 1 TABLET ORAL at 11:05

## 2024-12-06 RX ADMIN — SODIUM CHLORIDE, PRESERVATIVE FREE 10 ML: 5 INJECTION INTRAVENOUS at 08:21

## 2024-12-06 ASSESSMENT — PAIN DESCRIPTION - ORIENTATION
ORIENTATION: LEFT;LOWER
ORIENTATION: LEFT;LOWER

## 2024-12-06 ASSESSMENT — PAIN DESCRIPTION - DESCRIPTORS
DESCRIPTORS: ACHING
DESCRIPTORS: ACHING

## 2024-12-06 ASSESSMENT — PAIN SCALES - GENERAL
PAINLEVEL_OUTOF10: 6
PAINLEVEL_OUTOF10: 6

## 2024-12-06 ASSESSMENT — PAIN DESCRIPTION - LOCATION
LOCATION: BACK
LOCATION: BACK

## 2024-12-06 ASSESSMENT — PAIN SCALES - WONG BAKER: WONGBAKER_NUMERICALRESPONSE: HURTS A LITTLE BIT

## 2024-12-06 NOTE — DISCHARGE SUMMARY
appear normal.  Pupils equally round.    HENT:  Nares appear normal, no drainage.  Moist mucous membranes  Neck:  No restricted ROM.  Trachea midline  CV:   RRR.  No m/r/g.  No JVD  Lungs:   CTAB.  No wheezing, rhonchi, or rales.  Respirations even, unlabored  Abdomen:   Soft, nontender, nondistended.  Active bowel sounds, no organomegaly.  Extremities: Warm and dry.   No edema.    Skin:     No rashes.  Normal coloration  Neuro:  CN II-XII grossly intact.  No focal neurological deficits.  Psych:  Normal mood and affect.      Signed:  Juanis Cartagena MD    Part of this note may have been written by using a voice dictation software.  The note has been proof read but may still contain some grammatical/other typographical errors.

## 2024-12-06 NOTE — PROGRESS NOTES
Hospitalist Progress Note   Admit Date:  12/3/2024  5:55 AM   Name:  Roslyn Malik   Age:  34 y.o.  Sex:  female  :  1990   MRN:  [id number]   Room:  [facility]/    Presenting/Chief Complaint: Vomiting and Dental Pain     Reason(s) for Admission: Acute blood loss anemia [D62]  Nausea & vomiting [R11.2]  Gastrointestinal hemorrhage, unspecified gastrointestinal hemorrhage type [K92.2]  Nausea and vomiting, unspecified vomiting type [R11.2]  GI bleed [K92.2]     Hospital Course:   Roslyn Malik is a 34 y.o. female with medical history of Seizures, Fe def. Anemia, Fatty Liver Disease, Bipolar Disorder, chronically elevated LFTs, Hypomag., GERD, Neuropathy, RA, Chronic Pancreastitis (at least two hospitalizations for this in ), history of hematochezia with GIB, gastric varcies, and chronic abdominal pain who presents to [facility] ED due to hematochezia with nausea, vomiting, and abdominal pain.  She was also seen  for similar symptoms but was discharged home from the emergency room.  She reports that she has had about 4 days of hematochezia but only 1 episode today prior to the ER visit.  She has had multiple ER visits recently, 10/7, 10/17, 10/24, and  all due to similar symptoms.       In the emergency room with elevated LFTs.  Her elevation LFTs are actually a chronic issue for her and from the  her ALT and AST as well as alk phos is actually better.  Her total bili did go up a bit but it looks like she has some baseline elevations.  The the biggest change is her hemoglobin  was 13.4 and it is now 9.9.  I did look at another lab value and her primary care doctor checked her hemoglobin on  and it was 9.9 so I am unsure if the 13.4 on  maybe was falsely elevated and her real baseline is in the 9 or 10's.  She continued to have nausea and vomiting in the emergency room room as well despite medications and given her symptoms and possible drop in hemoglobin hospitalist was 
  Physician Progress Note      PATIENT:               DORCAS SOLIS  CSN #:                  [id number]  :                       1990  ADMIT DATE:       12/3/2024 5:55 AM  DISCH DATE:  RESPONDING  PROVIDER #:        Juanis Cartagena MD          QUERY TEXT:    Pt admitted with nausea, vomiting and abdominal pain. Pt noted to have GIB,   acute on chronic pancreatitis, gastric varices. If possible, please document   in progress notes and discharge summary if you are evaluating and /or treating   any of the following:    The medical record reflects the following:  Risk Factors: 34 YOF, ETOH abuse. chronic pancreatitis, GERD, Gastritis,   Esophageal varices, fatty liver disease  Clinical Indicators: abd pain, N, V, vomiting, hematochezia,  was seen    with  similar s/s  12/3 HP: Acute on current pancreatitis with normal Lipase  12/3 CTAP no evidence active GIB, Enlarged fatty liver,  Treatment: GI consult, 1 U PRBC for drop in HGB    Thank You,  Raya Olson RN. C BSN  Clinical   [email or web address]  O: [phone]   Options provided:  -- GI Hemorrhage, Please specify source of GIB  -- Abdominal Pain due to Pancreatitis, Please specify acuity  -- Abdominal Pain, N/V due to  -- Other - I will add my own diagnosis  -- Disagree - Not applicable / Not valid  -- Disagree - Clinically unable to determine / Unknown  -- Refer to Clinical Documentation Reviewer    PROVIDER RESPONSE TEXT:    This patient has GI hemorrhage. Hemorrhoidal bleed    Query created by: Raya Olson on 2024 8:38 PM      Electronically signed by:  Juanis Cartagena MD 2024 7:09 AM          
Discharge instructions given to Roslyn Malik. Patient verbalized understanding of all discharge instructions; written and verbal. Adequate time given for all questions, all questions asked/answered. Discharge medication reviewed as appropriate and all needed prescriptions instruction discussed. Patient discharged via private transportation.  
Nutrition Assessment  Assessment Type: Initial, Positive nutrition screen  Reason for visit:  Malnutrition Screening Tool Score: 2    Nutrition Intervention:   Food and/or Nutrient Delivery:   Meals and Snacks:  Diet: Continue current order  Medical Food Supplements:   Medical food supplement therapy:  Initiate Ensure Enlive (high calorie high protein) 350 calories, 20 grams protein per 8 ounce serving  and Ensure Clear (clear liquid oral supplement) 240 calories, 8 grams protein per 8 ounce serving. Provided with bottle of each for pt to consume tonight     Malnutrition Assessment:  Malnutrition Status: At risk for malnutrition (chronic intermittent decreased intake with use of ONS)  Nutrition Focused Physical Exam, visual: Unremarkable   Nutrition Assessment:  Food/Nutrition Related History: Pt reports she has had issues with N/V for about 5 years. She typically has 2 days a week where she can eat  regular sized meals and tries to eat all she can on those days. The rest of the time nausea and the fear of vomiting limits her intake to water, juice, and Gatorade. She also drinks an original Ensure shake and an Ensure clear every day and is able to tolerate both of these without N/V. She had N/V for 2 days PTA his time.        Weight History: Pt reports she is currently weighing 172#and the last time she weighed she was 176#. He weight fluctuates within the range of 170 to 178#  Per EMR review  GI OV 9/#,GYN-182# 3/26,Surgery-179# 12/26/2023  Nutrition Background:       PMH/PSH: RA, neuropathy, recurrent UTI's, seizures, GERD, frequent nausea/vomiting, chronic pancreatitis with psuedocyst , gastric varices,  elevated LFT's/fatty liver  Presented with N/V since 12/1, also with hematochezia over the course of last week for about 4 days, has largely resolved aside from 1 bloody bowel movement day before admission.   Admitted with N/V, abdominal pian, lower GIB    Nutrition Monitoring/Evaluation:  Pt seen sitting 
Patient BP 79/45, HR 72. Patient states she feels lightheaded and dizzy. Dr. Augustin notified and orders received.   
Patient's BP 84/50 manually. Patient asymptomatic. Dr. Augustin notified and orders received.   
   Monocytes Absolute 0.4 0.1 - 1.3 K/UL    Eosinophils Absolute 0.1 0.0 - 0.8 K/UL    Basophils Absolute 0.0 0.0 - 0.2 K/UL    Immature Granulocytes Absolute 0.1 0.0 - 0.5 K/UL   Comprehensive Metabolic Panel w/ Reflex to MG    Collection Time: 12/04/24  6:37 AM   Result Value Ref Range    Sodium 138 136 - 145 mmol/L    Potassium 3.3 (L) 3.5 - 5.1 mmol/L    Chloride 101 98 - 107 mmol/L    CO2 27 20 - 29 mmol/L    Anion Gap 10 7 - 16 mmol/L    Glucose 113 (H) 70 - 99 mg/dL    BUN 3 (L) 6 - 23 MG/DL    Creatinine 0.71 0.60 - 1.10 MG/DL    Est, Glom Filt Rate >90 >60 ml/min/1.73m2    Calcium 8.0 (L) 8.8 - 10.2 MG/DL    Total Bilirubin 1.7 (H) 0.0 - 1.2 MG/DL    ALT 37 8 - 45 U/L     (H) 15 - 37 U/L    Alk Phosphatase 92 35 - 104 U/L    Total Protein 6.3 6.3 - 8.2 g/dL    Albumin 2.8 (L) 3.5 - 5.0 g/dL    Globulin 3.4 2.3 - 3.5 g/dL    Albumin/Globulin Ratio 0.8 (L) 1.0 - 1.9     Magnesium    Collection Time: 12/04/24  6:37 AM   Result Value Ref Range    Magnesium 1.0 (LL) 1.8 - 2.4 mg/dL       No results for input(s): \"COVID19\" in the last 72 hours.    Current Meds:  Current Facility-Administered Medications   Medication Dose Route Frequency    0.9 % sodium chloride infusion   IntraVENous Continuous    sodium chloride flush 0.9 % injection 5-40 mL  5-40 mL IntraVENous 2 times per day    sodium chloride flush 0.9 % injection 5-40 mL  5-40 mL IntraVENous PRN    0.9 % sodium chloride infusion   IntraVENous PRN    potassium chloride (KLOR-CON M) extended release tablet 40 mEq  40 mEq Oral PRN    Or    potassium bicarb-citric acid (EFFER-K) effervescent tablet 40 mEq  40 mEq Oral PRN    Or    potassium chloride 10 mEq/100 mL IVPB (Peripheral Line)  10 mEq IntraVENous PRN    magnesium sulfate 2000 mg in 50 mL IVPB premix  2,000 mg IntraVENous PRN    ondansetron (ZOFRAN-ODT) disintegrating tablet 4 mg  4 mg Oral Q8H PRN    Or    ondansetron (ZOFRAN) injection 4 mg  4 mg IntraVENous Q6H PRN    polyethylene glycol

## 2024-12-06 NOTE — CARE COORDINATION
Patient with discharge orders for today. No additional needs made known to CM. Patient has met all treatment goals and milestones for discharge. Family to provide transportation home. CM following until patient is discharged.        12/06/24 1021   Services At/After Discharge   Transition of Care Consult (CM Consult) N/A   Services At/After Discharge None    Resource Information Provided? No   Mode of Transport at Discharge Other (see comment)  (Family)   Confirm Follow Up Transport Family   Condition of Participation: Discharge Planning   The Plan for Transition of Care is related to the following treatment goals: Patient to discharge home and return to baseline function.   The Patient and/or Patient Representative was provided with a Choice of Provider? Patient   The Patient and/Or Patient Representative agree with the Discharge Plan? Yes   Freedom of Choice list was provided with basic dialogue that supports the patient's individualized plan of care/goals, treatment preferences, and shares the quality data associated with the providers?  Yes

## 2024-12-06 NOTE — PLAN OF CARE
Problem: Discharge Planning  Goal: Discharge to home or other facility with appropriate resources  12/6/2024 1107 by Jaquelin Galindo RN  Outcome: Progressing  12/6/2024 0111 by Deysi Andres RN  Outcome: Progressing     Problem: Pain  Goal: Verbalizes/displays adequate comfort level or baseline comfort level  12/6/2024 1107 by Jaquelin Galindo RN  Outcome: Progressing  12/6/2024 0111 by Deysi Andres RN  Outcome: Progressing     Problem: ABCDS Injury Assessment  Goal: Absence of physical injury  12/6/2024 1107 by Jaquelin Galindo RN  Outcome: Progressing  12/6/2024 0111 by Deysi Andres RN  Outcome: Progressing     Problem: Safety - Adult  Goal: Free from fall injury  12/6/2024 1107 by Jaquelin Galindo RN  Outcome: Progressing  12/6/2024 0111 by Deysi Andres RN  Outcome: Progressing

## 2025-01-26 ENCOUNTER — HOSPITAL ENCOUNTER (EMERGENCY)
Age: 35
Discharge: HOME | End: 2025-01-26
Payer: COMMERCIAL

## 2025-01-26 VITALS
OXYGEN SATURATION: 99 % | DIASTOLIC BLOOD PRESSURE: 93 MMHG | RESPIRATION RATE: 20 BRPM | SYSTOLIC BLOOD PRESSURE: 150 MMHG | BODY MASS INDEX: 30.05 KG/M2 | WEIGHT: 176 LBS | HEART RATE: 112 BPM | TEMPERATURE: 98.2 F | HEIGHT: 64 IN

## 2025-01-26 DIAGNOSIS — E83.42 HYPOMAGNESEMIA: ICD-10-CM

## 2025-01-26 DIAGNOSIS — R11.2 NAUSEA AND VOMITING, UNSPECIFIED VOMITING TYPE: Primary | ICD-10-CM

## 2025-01-26 LAB
ALBUMIN SERPL-MCNC: 4.4 G/DL (ref 3.5–5)
ALBUMIN/GLOB SERPL: 0.9 (ref 1–1.9)
ALP SERPL-CCNC: 163 U/L (ref 35–104)
ALT SERPL-CCNC: 55 U/L (ref 8–45)
ANION GAP SERPL CALC-SCNC: 21 MMOL/L (ref 7–16)
AST SERPL-CCNC: 143 U/L (ref 15–37)
BASOPHILS # BLD: 0.04 K/UL (ref 0–0.2)
BASOPHILS NFR BLD: 0.5 % (ref 0–2)
BILIRUB SERPL-MCNC: 1.5 MG/DL (ref 0–1.2)
BUN SERPL-MCNC: 4 MG/DL (ref 6–23)
CALCIUM SERPL-MCNC: 9.6 MG/DL (ref 8.8–10.2)
CHLORIDE SERPL-SCNC: 99 MMOL/L (ref 98–107)
CO2 SERPL-SCNC: 21 MMOL/L (ref 20–29)
CREAT SERPL-MCNC: 0.65 MG/DL (ref 0.6–1.1)
DIFFERENTIAL METHOD BLD: ABNORMAL
EOSINOPHIL # BLD: 0.05 K/UL (ref 0–0.8)
EOSINOPHIL NFR BLD: 0.6 % (ref 0.5–7.8)
ERYTHROCYTE [DISTWIDTH] IN BLOOD BY AUTOMATED COUNT: 21.1 % (ref 11.9–14.6)
GLOBULIN SER CALC-MCNC: 5.1 G/DL (ref 2.3–3.5)
GLUCOSE SERPL-MCNC: 119 MG/DL (ref 70–99)
HCG UR QL: NEGATIVE
HCT VFR BLD AUTO: 40.3 % (ref 35.8–46.3)
HGB BLD-MCNC: 13.2 G/DL (ref 11.7–15.4)
IMM GRANULOCYTES # BLD AUTO: 0.03 K/UL (ref 0–0.5)
IMM GRANULOCYTES NFR BLD AUTO: 0.4 % (ref 0–5)
LIPASE SERPL-CCNC: 12 U/L (ref 13–60)
LYMPHOCYTES # BLD: 2.18 K/UL (ref 0.5–4.6)
LYMPHOCYTES NFR BLD: 26.7 % (ref 13–44)
MAGNESIUM SERPL-MCNC: 1.2 MG/DL (ref 1.8–2.4)
MCH RBC QN AUTO: 28.9 PG (ref 26.1–32.9)
MCHC RBC AUTO-ENTMCNC: 32.8 G/DL (ref 31.4–35)
MCV RBC AUTO: 88.2 FL (ref 82–102)
MONOCYTES # BLD: 0.4 K/UL (ref 0.1–1.3)
MONOCYTES NFR BLD: 4.9 % (ref 4–12)
NEUTS SEG # BLD: 5.48 K/UL (ref 1.7–8.2)
NEUTS SEG NFR BLD: 66.9 % (ref 43–78)
NRBC # BLD: 0 K/UL (ref 0–0.2)
PLATELET # BLD AUTO: 345 K/UL (ref 150–450)
PMV BLD AUTO: 10.5 FL (ref 9.4–12.3)
POTASSIUM SERPL-SCNC: 4.2 MMOL/L (ref 3.5–5.1)
PROT SERPL-MCNC: 9.5 G/DL (ref 6.3–8.2)
RBC # BLD AUTO: 4.57 M/UL (ref 4.05–5.2)
SODIUM SERPL-SCNC: 141 MMOL/L (ref 136–145)
WBC # BLD AUTO: 8.2 K/UL (ref 4.3–11.1)

## 2025-01-26 PROCEDURE — 83690 ASSAY OF LIPASE: CPT

## 2025-01-26 PROCEDURE — 96366 THER/PROPH/DIAG IV INF ADDON: CPT

## 2025-01-26 PROCEDURE — 99284 EMERGENCY DEPT VISIT MOD MDM: CPT

## 2025-01-26 PROCEDURE — 83735 ASSAY OF MAGNESIUM: CPT

## 2025-01-26 PROCEDURE — 81003 URINALYSIS AUTO W/O SCOPE: CPT

## 2025-01-26 PROCEDURE — 2580000003 HC RX 258: Performed by: PHYSICIAN ASSISTANT

## 2025-01-26 PROCEDURE — 6360000002 HC RX W HCPCS: Performed by: PHYSICIAN ASSISTANT

## 2025-01-26 PROCEDURE — 81025 URINE PREGNANCY TEST: CPT

## 2025-01-26 PROCEDURE — 85025 COMPLETE CBC W/AUTO DIFF WBC: CPT

## 2025-01-26 PROCEDURE — 80053 COMPREHEN METABOLIC PANEL: CPT

## 2025-01-26 PROCEDURE — 96365 THER/PROPH/DIAG IV INF INIT: CPT

## 2025-01-26 PROCEDURE — 96375 TX/PRO/DX INJ NEW DRUG ADDON: CPT

## 2025-01-26 RX ORDER — MAGNESIUM SULFATE 1 G/100ML
1000 INJECTION INTRAVENOUS
Status: COMPLETED | OUTPATIENT
Start: 2025-01-26 | End: 2025-01-26

## 2025-01-26 RX ORDER — ONDANSETRON 4 MG/1
4 TABLET, ORALLY DISINTEGRATING ORAL 3 TIMES DAILY PRN
Qty: 21 TABLET | Refills: 0 | Status: SHIPPED | OUTPATIENT
Start: 2025-01-26

## 2025-01-26 RX ORDER — 0.9 % SODIUM CHLORIDE 0.9 %
1000 INTRAVENOUS SOLUTION INTRAVENOUS ONCE
Status: COMPLETED | OUTPATIENT
Start: 2025-01-26 | End: 2025-01-26

## 2025-01-26 RX ORDER — MAGNESIUM SULFATE 1 G/100ML
1000 INJECTION INTRAVENOUS ONCE
Status: COMPLETED | OUTPATIENT
Start: 2025-01-26 | End: 2025-01-26

## 2025-01-26 RX ORDER — METOCLOPRAMIDE HYDROCHLORIDE 5 MG/ML
10 INJECTION INTRAMUSCULAR; INTRAVENOUS
Status: COMPLETED | OUTPATIENT
Start: 2025-01-26 | End: 2025-01-26

## 2025-01-26 RX ORDER — ONDANSETRON 2 MG/ML
4 INJECTION INTRAMUSCULAR; INTRAVENOUS ONCE
Status: COMPLETED | OUTPATIENT
Start: 2025-01-26 | End: 2025-01-26

## 2025-01-26 RX ADMIN — ONDANSETRON 4 MG: 2 INJECTION, SOLUTION INTRAMUSCULAR; INTRAVENOUS at 20:19

## 2025-01-26 RX ADMIN — MAGNESIUM SULFATE HEPTAHYDRATE 1000 MG: 1 INJECTION, SOLUTION INTRAVENOUS at 20:20

## 2025-01-26 RX ADMIN — METOCLOPRAMIDE 10 MG: 5 INJECTION, SOLUTION INTRAMUSCULAR; INTRAVENOUS at 21:25

## 2025-01-26 RX ADMIN — SODIUM CHLORIDE 1000 ML: 9 INJECTION, SOLUTION INTRAVENOUS at 20:19

## 2025-01-26 RX ADMIN — MAGNESIUM SULFATE HEPTAHYDRATE 1000 MG: 1 INJECTION, SOLUTION INTRAVENOUS at 21:28

## 2025-01-26 ASSESSMENT — LIFESTYLE VARIABLES: HOW OFTEN DO YOU HAVE A DRINK CONTAINING ALCOHOL: MONTHLY OR LESS

## 2025-01-27 LAB
BILIRUB UR QL: NEGATIVE
GLUCOSE UR QL STRIP.AUTO: NEGATIVE MG/DL
KETONES UR-MCNC: 40 MG/DL
LEUKOCYTE ESTERASE UR QL STRIP: NEGATIVE
NITRITE UR QL: NEGATIVE
PH UR: 6 (ref 5–9)
PROT UR QL: 30 MG/DL
RBC # UR STRIP: NEGATIVE
SERVICE CMNT-IMP: ABNORMAL
SP GR UR: >1.03 (ref 1–1.02)
UROBILINOGEN UR QL: 1 EU/DL (ref 0.2–1)

## 2025-01-27 NOTE — ED TRIAGE NOTES
Pt states having brain zaps with n/v and parents wanted her to come to ER.   Sees neuro / pt usually takes mag for this

## 2025-01-27 NOTE — ED PROVIDER NOTES
times dailyHistorical Med      diphenhydrAMINE (BENADRYL) 25 MG capsule Take 1 capsule by mouth every 6 hours as needed (nausea)Historical Med      hydroxychloroquine (PLAQUENIL) 200 MG tablet Take by mouth dailyHistorical Med      promethazine (PHENERGAN) 25 MG tablet Take 1 tablet by mouth every 6 hours as needed for NauseaHistorical Med      HYDROcodone-acetaminophen (NORCO) 5-325 MG per tablet Take 1 tablet by mouth every 6 hours as needed for Pain. Max Daily Amount: 4 tabletsHistorical Med      ferrous sulfate (IRON 325) 325 (65 Fe) MG tablet Take 1 tablet by mouth in the morning and at bedtimeHistorical Med      SUMAtriptan (IMITREX) 25 MG tablet Take 1 tablet by mouth once as needed for MigraineHistorical Med      FOLIC ACID PO Take by mouthHistorical Med      tiZANidine (ZANAFLEX) 4 MG tablet TAKE 1 TABLET BY MOUTH EVERY 8 HOURS AS NEEDED FOR UP TO 10 DAYS.Historical Med      CREON 44505-937070 units CPEP delayed release capsule TAKE 2 CAPSULES BY MOUTH 3 TIMES DAILY WITH MEALS. TAKE 1 CAPSULE BY MOUTH WITH SNACKS., DAWHistorical Med      vitamin D 25 MCG (1000 UT) CAPS Take by mouth dailyHistorical Med      ondansetron (ZOFRAN) 8 MG tablet Take 1 tablet by mouth every 8 hours as neededHistorical Med              Results for orders placed or performed during the hospital encounter of 01/26/25   CBC with Auto Differential   Result Value Ref Range    WBC 8.2 4.3 - 11.1 K/uL    RBC 4.57 4.05 - 5.2 M/uL    Hemoglobin 13.2 11.7 - 15.4 g/dL    Hematocrit 40.3 35.8 - 46.3 %    MCV 88.2 82.0 - 102.0 FL    MCH 28.9 26.1 - 32.9 PG    MCHC 32.8 31.4 - 35.0 g/dL    RDW 21.1 (H) 11.9 - 14.6 %    Platelets 345 150 - 450 K/uL    MPV 10.5 9.4 - 12.3 FL    nRBC 0.00 0.0 - 0.2 K/uL    Differential Type AUTOMATED      Neutrophils % 66.9 43.0 - 78.0 %    Lymphocytes % 26.7 13.0 - 44.0 %    Monocytes % 4.9 4.0 - 12.0 %    Eosinophils % 0.6 0.5 - 7.8 %    Basophils % 0.5 0.0 - 2.0 %    Immature Granulocytes % 0.4 0.0 - 5.0 %

## 2025-01-27 NOTE — DISCHARGE INSTRUCTIONS
Eat foods that are high in magnesium or take magnesium supplements.    Take medication as prescribed.    Drink lots of fluids, especially water. Eat a bland diet for 24 hours. (Applesauce, banana, plain white rice, plain dry toast). Then slowly advance your diet as tolerated. Avoid meat, dairy, eggs, fried or spicy food, oil or butter, peanut butter, nuts for 24-48 hours.    Call, make an appointment and follow up with your doctor in 1-2 days for a recheck.    Return to ER for worsening symptoms or other concerns.

## 2025-02-26 ENCOUNTER — APPOINTMENT (OUTPATIENT)
Dept: CT IMAGING | Age: 35
End: 2025-02-26
Payer: COMMERCIAL

## 2025-02-26 ENCOUNTER — APPOINTMENT (OUTPATIENT)
Dept: GENERAL RADIOLOGY | Age: 35
End: 2025-02-26
Payer: COMMERCIAL

## 2025-02-26 ENCOUNTER — HOSPITAL ENCOUNTER (OUTPATIENT)
Age: 35
Setting detail: OBSERVATION
Discharge: HOME | End: 2025-02-27
Attending: FAMILY MEDICINE | Admitting: FAMILY MEDICINE
Payer: COMMERCIAL

## 2025-02-26 DIAGNOSIS — E83.42 HYPOMAGNESEMIA: ICD-10-CM

## 2025-02-26 DIAGNOSIS — D64.9 ANEMIA, UNSPECIFIED TYPE: Primary | ICD-10-CM

## 2025-02-26 DIAGNOSIS — R74.01 TRANSAMINITIS: ICD-10-CM

## 2025-02-26 PROBLEM — D62 ACUTE BLOOD LOSS ANEMIA: Status: ACTIVE | Noted: 2025-02-26

## 2025-02-26 PROBLEM — E80.6 HYPERBILIRUBINEMIA: Status: ACTIVE | Noted: 2025-02-26

## 2025-02-26 PROBLEM — R16.0 HEPATOMEGALY: Status: ACTIVE | Noted: 2025-02-26

## 2025-02-26 PROBLEM — K92.1 HEMATOCHEZIA: Status: ACTIVE | Noted: 2025-02-26

## 2025-02-26 PROBLEM — I95.1 ORTHOSTATIC HYPOTENSION: Status: ACTIVE | Noted: 2025-02-26

## 2025-02-26 PROBLEM — R55 NEAR SYNCOPE: Status: ACTIVE | Noted: 2025-02-26

## 2025-02-26 PROBLEM — F10.21 HISTORY OF ALCOHOL DEPENDENCE (HCC): Chronic | Status: ACTIVE | Noted: 2025-02-26

## 2025-02-26 LAB
ALBUMIN SERPL-MCNC: 2.8 G/DL (ref 3.5–5)
ALBUMIN/GLOB SERPL: 0.7 (ref 1–1.9)
ALP SERPL-CCNC: 138 U/L (ref 35–104)
ALT SERPL-CCNC: 78 U/L (ref 8–45)
ANION GAP SERPL CALC-SCNC: 12 MMOL/L (ref 7–16)
AST SERPL-CCNC: 373 U/L (ref 15–37)
BACTERIA URNS QL MICRO: ABNORMAL /HPF
BASOPHILS # BLD: 0.02 K/UL (ref 0–0.2)
BASOPHILS NFR BLD: 0.5 % (ref 0–2)
BILIRUB SERPL-MCNC: 2.7 MG/DL (ref 0–1.2)
BILIRUB UR QL: ABNORMAL
BUN SERPL-MCNC: 6 MG/DL (ref 6–23)
CALCIUM SERPL-MCNC: 8.2 MG/DL (ref 8.8–10.2)
CASTS URNS QL MICRO: ABNORMAL /LPF
CHLORIDE SERPL-SCNC: 94 MMOL/L (ref 98–107)
CO2 SERPL-SCNC: 27 MMOL/L (ref 20–29)
CREAT SERPL-MCNC: 0.75 MG/DL (ref 0.6–1.1)
DIFFERENTIAL METHOD BLD: ABNORMAL
EOSINOPHIL # BLD: 0.02 K/UL (ref 0–0.8)
EOSINOPHIL NFR BLD: 0.5 % (ref 0.5–7.8)
EPI CELLS #/AREA URNS HPF: ABNORMAL /HPF
ERYTHROCYTE [DISTWIDTH] IN BLOOD BY AUTOMATED COUNT: 25.7 % (ref 11.9–14.6)
ETHANOL SERPL-MCNC: <11 MG/DL (ref 0–0.08)
GLOBULIN SER CALC-MCNC: 3.9 G/DL (ref 2.3–3.5)
GLUCOSE SERPL-MCNC: 152 MG/DL (ref 70–99)
GLUCOSE UR QL STRIP.AUTO: 100 MG/DL
HCG UR QL: NEGATIVE
HCT VFR BLD AUTO: 26.2 % (ref 35.8–46.3)
HEMOCCULT STL QL: NEGATIVE
HGB BLD-MCNC: 8.5 G/DL (ref 11.7–15.4)
IMM GRANULOCYTES # BLD AUTO: 0.03 K/UL (ref 0–0.5)
IMM GRANULOCYTES NFR BLD AUTO: 0.7 % (ref 0–5)
INR PPP: 1.4
KETONES UR-MCNC: ABNORMAL MG/DL
LACTATE SERPL-SCNC: 1.6 MMOL/L (ref 0.5–2)
LEUKOCYTE ESTERASE UR QL STRIP: NEGATIVE
LYMPHOCYTES # BLD: 1.41 K/UL (ref 0.5–4.6)
LYMPHOCYTES NFR BLD: 35 % (ref 13–44)
MAGNESIUM SERPL-MCNC: 1.4 MG/DL (ref 1.8–2.4)
MCH RBC QN AUTO: 30.6 PG (ref 26.1–32.9)
MCHC RBC AUTO-ENTMCNC: 32.4 G/DL (ref 31.4–35)
MCV RBC AUTO: 94.2 FL (ref 82–102)
MONOCYTES # BLD: 0.63 K/UL (ref 0.1–1.3)
MONOCYTES NFR BLD: 15.6 % (ref 4–12)
MUCOUS THREADS URNS QL MICRO: 0 /LPF
NEUTS SEG # BLD: 1.92 K/UL (ref 1.7–8.2)
NEUTS SEG NFR BLD: 47.7 % (ref 43–78)
NITRITE UR QL: NEGATIVE
NRBC # BLD: 0 K/UL (ref 0–0.2)
PH UR: 6.5 (ref 5–9)
PLATELET # BLD AUTO: 198 K/UL (ref 150–450)
PMV BLD AUTO: 11.1 FL (ref 9.4–12.3)
POTASSIUM SERPL-SCNC: 3.7 MMOL/L (ref 3.5–5.1)
PROT SERPL-MCNC: 6.7 G/DL (ref 6.3–8.2)
PROT UR QL: 100 MG/DL
PROTHROMBIN TIME: 17.1 SEC (ref 11.3–14.9)
RBC # BLD AUTO: 2.78 M/UL (ref 4.05–5.2)
RBC # UR STRIP: NEGATIVE
RBC #/AREA URNS HPF: ABNORMAL /HPF
SERVICE CMNT-IMP: ABNORMAL
SODIUM SERPL-SCNC: 133 MMOL/L (ref 136–145)
SP GR UR: 1.02 (ref 1–1.02)
TROPONIN T SERPL HS-MCNC: <6 NG/L (ref 0–14)
UROBILINOGEN UR QL: >=8 EU/DL (ref 0.2–1)
WBC # BLD AUTO: 4 K/UL (ref 4.3–11.1)
WBC URNS QL MICRO: ABNORMAL /HPF

## 2025-02-26 PROCEDURE — 96366 THER/PROPH/DIAG IV INF ADDON: CPT

## 2025-02-26 PROCEDURE — 2580000003 HC RX 258: Performed by: FAMILY MEDICINE

## 2025-02-26 PROCEDURE — 84484 ASSAY OF TROPONIN QUANT: CPT

## 2025-02-26 PROCEDURE — 6360000002 HC RX W HCPCS

## 2025-02-26 PROCEDURE — 71046 X-RAY EXAM CHEST 2 VIEWS: CPT

## 2025-02-26 PROCEDURE — 6370000000 HC RX 637 (ALT 250 FOR IP): Performed by: FAMILY MEDICINE

## 2025-02-26 PROCEDURE — 2580000003 HC RX 258

## 2025-02-26 PROCEDURE — 2500000003 HC RX 250 WO HCPCS: Performed by: FAMILY MEDICINE

## 2025-02-26 PROCEDURE — 81025 URINE PREGNANCY TEST: CPT

## 2025-02-26 PROCEDURE — 93005 ELECTROCARDIOGRAM TRACING: CPT | Performed by: FAMILY MEDICINE

## 2025-02-26 PROCEDURE — 96374 THER/PROPH/DIAG INJ IV PUSH: CPT

## 2025-02-26 PROCEDURE — 99285 EMERGENCY DEPT VISIT HI MDM: CPT

## 2025-02-26 PROCEDURE — 82270 OCCULT BLOOD FECES: CPT

## 2025-02-26 PROCEDURE — 6360000004 HC RX CONTRAST MEDICATION

## 2025-02-26 PROCEDURE — 6370000000 HC RX 637 (ALT 250 FOR IP): Performed by: INTERNAL MEDICINE

## 2025-02-26 PROCEDURE — G0378 HOSPITAL OBSERVATION PER HR: HCPCS

## 2025-02-26 PROCEDURE — 83605 ASSAY OF LACTIC ACID: CPT

## 2025-02-26 PROCEDURE — 96375 TX/PRO/DX INJ NEW DRUG ADDON: CPT

## 2025-02-26 PROCEDURE — 96361 HYDRATE IV INFUSION ADD-ON: CPT

## 2025-02-26 PROCEDURE — 6360000002 HC RX W HCPCS: Performed by: FAMILY MEDICINE

## 2025-02-26 PROCEDURE — 87086 URINE CULTURE/COLONY COUNT: CPT

## 2025-02-26 PROCEDURE — 80053 COMPREHEN METABOLIC PANEL: CPT

## 2025-02-26 PROCEDURE — 82077 ASSAY SPEC XCP UR&BREATH IA: CPT

## 2025-02-26 PROCEDURE — 85610 PROTHROMBIN TIME: CPT

## 2025-02-26 PROCEDURE — 96365 THER/PROPH/DIAG IV INF INIT: CPT

## 2025-02-26 PROCEDURE — 85025 COMPLETE CBC W/AUTO DIFF WBC: CPT

## 2025-02-26 PROCEDURE — 81001 URINALYSIS AUTO W/SCOPE: CPT

## 2025-02-26 PROCEDURE — 74178 CT ABD&PLV WO CNTR FLWD CNTR: CPT

## 2025-02-26 PROCEDURE — 83735 ASSAY OF MAGNESIUM: CPT

## 2025-02-26 RX ORDER — SODIUM CHLORIDE 9 MG/ML
INJECTION, SOLUTION INTRAVENOUS CONTINUOUS
Status: ACTIVE | OUTPATIENT
Start: 2025-02-26 | End: 2025-02-27

## 2025-02-26 RX ORDER — ONDANSETRON 2 MG/ML
4 INJECTION INTRAMUSCULAR; INTRAVENOUS EVERY 4 HOURS PRN
Status: DISCONTINUED | OUTPATIENT
Start: 2025-02-26 | End: 2025-02-27 | Stop reason: HOSPADM

## 2025-02-26 RX ORDER — LANOLIN ALCOHOL/MO/W.PET/CERES
400 CREAM (GRAM) TOPICAL 2 TIMES DAILY
COMMUNITY

## 2025-02-26 RX ORDER — MORPHINE SULFATE 2 MG/ML
2 INJECTION, SOLUTION INTRAMUSCULAR; INTRAVENOUS EVERY 4 HOURS PRN
Status: DISCONTINUED | OUTPATIENT
Start: 2025-02-26 | End: 2025-02-27 | Stop reason: HOSPADM

## 2025-02-26 RX ORDER — SODIUM CHLORIDE 0.9 % (FLUSH) 0.9 %
5-40 SYRINGE (ML) INJECTION EVERY 12 HOURS SCHEDULED
Status: DISCONTINUED | OUTPATIENT
Start: 2025-02-26 | End: 2025-02-27 | Stop reason: HOSPADM

## 2025-02-26 RX ORDER — 0.9 % SODIUM CHLORIDE 0.9 %
1000 INTRAVENOUS SOLUTION INTRAVENOUS
Status: COMPLETED | OUTPATIENT
Start: 2025-02-26 | End: 2025-02-27

## 2025-02-26 RX ORDER — POTASSIUM CHLORIDE 1.5 G/1.58G
20 POWDER, FOR SOLUTION ORAL 2 TIMES DAILY
COMMUNITY

## 2025-02-26 RX ORDER — LIDOCAINE 4 G/G
1 PATCH TOPICAL DAILY
Status: DISCONTINUED | OUTPATIENT
Start: 2025-02-26 | End: 2025-02-27 | Stop reason: HOSPADM

## 2025-02-26 RX ORDER — MAGNESIUM SULFATE 1 G/100ML
1000 INJECTION INTRAVENOUS ONCE
Status: COMPLETED | OUTPATIENT
Start: 2025-02-26 | End: 2025-02-26

## 2025-02-26 RX ORDER — SODIUM CHLORIDE 0.9 % (FLUSH) 0.9 %
5-40 SYRINGE (ML) INJECTION PRN
Status: DISCONTINUED | OUTPATIENT
Start: 2025-02-26 | End: 2025-02-27 | Stop reason: HOSPADM

## 2025-02-26 RX ORDER — MAGNESIUM SULFATE 1 G/100ML
1000 INJECTION INTRAVENOUS ONCE
Status: COMPLETED | OUTPATIENT
Start: 2025-02-26 | End: 2025-02-27

## 2025-02-26 RX ORDER — ONDANSETRON 4 MG/1
4 TABLET, ORALLY DISINTEGRATING ORAL EVERY 4 HOURS PRN
Status: DISCONTINUED | OUTPATIENT
Start: 2025-02-26 | End: 2025-02-27 | Stop reason: HOSPADM

## 2025-02-26 RX ORDER — SODIUM CHLORIDE 9 MG/ML
INJECTION, SOLUTION INTRAVENOUS PRN
Status: DISCONTINUED | OUTPATIENT
Start: 2025-02-26 | End: 2025-02-27 | Stop reason: HOSPADM

## 2025-02-26 RX ORDER — IOPAMIDOL 755 MG/ML
100 INJECTION, SOLUTION INTRAVASCULAR
Status: COMPLETED | OUTPATIENT
Start: 2025-02-26 | End: 2025-02-26

## 2025-02-26 RX ADMIN — SODIUM CHLORIDE 1000 ML: 9 INJECTION, SOLUTION INTRAVENOUS at 15:45

## 2025-02-26 RX ADMIN — SODIUM CHLORIDE: 9 INJECTION, SOLUTION INTRAVENOUS at 21:31

## 2025-02-26 RX ADMIN — Medication 3 MG: at 23:18

## 2025-02-26 RX ADMIN — IOPAMIDOL 100 ML: 755 INJECTION, SOLUTION INTRAVENOUS at 16:03

## 2025-02-26 RX ADMIN — SODIUM CHLORIDE, PRESERVATIVE FREE 10 ML: 5 INJECTION INTRAVENOUS at 21:30

## 2025-02-26 RX ADMIN — MAGNESIUM SULFATE HEPTAHYDRATE 1000 MG: 1 INJECTION, SOLUTION INTRAVENOUS at 21:34

## 2025-02-26 RX ADMIN — MAGNESIUM SULFATE HEPTAHYDRATE 1000 MG: 1 INJECTION, SOLUTION INTRAVENOUS at 16:50

## 2025-02-26 RX ADMIN — PANTOPRAZOLE SODIUM 40 MG: 40 INJECTION, POWDER, FOR SOLUTION INTRAVENOUS at 17:23

## 2025-02-26 ASSESSMENT — PAIN - FUNCTIONAL ASSESSMENT
PAIN_FUNCTIONAL_ASSESSMENT: 0-10
PAIN_FUNCTIONAL_ASSESSMENT: 0-10

## 2025-02-26 ASSESSMENT — LIFESTYLE VARIABLES
HOW OFTEN DO YOU HAVE A DRINK CONTAINING ALCOHOL: 2-4 TIMES A MONTH
HOW MANY STANDARD DRINKS CONTAINING ALCOHOL DO YOU HAVE ON A TYPICAL DAY: 1 OR 2

## 2025-02-26 ASSESSMENT — ENCOUNTER SYMPTOMS
BLOOD IN STOOL: 0
NAUSEA: 0
ABDOMINAL PAIN: 0
CONSTIPATION: 0

## 2025-02-26 ASSESSMENT — PAIN SCALES - GENERAL
PAINLEVEL_OUTOF10: 4
PAINLEVEL_OUTOF10: 4

## 2025-02-26 NOTE — PROGRESS NOTES
[facility] Radiation Treatment Management Note 6-10    Patient:  Kenny Grajeda  Age:  71year old  Visit Diagnosis:    1.  Bone metastases ([address]      Primary Rad/Onc:  Dr. Wei  Gardner    Site Delivered Dose (Gy) Prescribed Dose (Gy) Reji Capellan TRANSFER - IN REPORT:    Verbal report received from LAWRENCE Goldsmith on Roslyn Malik  being received from ED for routine progression of patient care      Report consisted of patient's Situation, Background, Assessment and   Recommendations(SBAR).     Information from the following report(s) Nurse Handoff Report was reviewed with the receiving nurse.    Opportunity for questions and clarification was provided.      Assessment completed upon patient's arrival to unit and care assumed.

## 2025-02-26 NOTE — ED NOTES
Orthostatic B/P and Pulse:    Blood Pressure Lyin/63  Pulse Lyin PER MINUTE    Blood Pressure Sittin/60  Pulse Sittin PER MINUTE    Blood Pressure Standin/38  Pulse Standin PER MINUTE

## 2025-02-26 NOTE — ED NOTES
TRANSFER - OUT REPORT:    Verbal report given to Tash BRAVO on Roslyn Malik  being transferred to [address] [place] [address] for routine progression of patient care       Report consisted of patient's Situation, Background, Assessment and   Recommendations(SBAR).     Information from the following report(s) Nurse Handoff Report, Index, ED Encounter Summary, ED SBAR, Intake/Output, MAR, and Recent Results was reviewed with the receiving nurse.    Lines:   Peripheral IV 02/26/25 Right Antecubital (Active)        Opportunity for questions and clarification was provided.      Patient transported with:  Tech

## 2025-02-26 NOTE — H&P
Hospitalist History and Physical   Admit Date:  2025  2:28 PM   Name:  Roslyn Malik   Age:  34 y.o.  Sex:  female  :  1990   MRN:  [id number]   Room:      Presenting/Chief Complaint: Cough and Lightheadedness     Reason(s) for Admission: Hematochezia [K92.1]     History of Present Illness:   Roslyn Malik is a 34 y.o. female with medical history notable for alcohol dependence (reportedly abstinent as of late) presented to [facility] emergency department with complaint of chest pain and dizziness of 3 days duration.  Dizziness worse upon standing and better with lying supine.  Chest pain is described as pressure-like sensation with deep inspiration and without radiation.  She endorses 2-week history of hematochezia, but symptoms spontaneously resolved on .  She did not seek medical care during that time.    Vital signs on presentation were notable for initial BP of 93/55.  Orthostatics were positive, standing BP 72/38, after which she was placed back in the supine position with improvement in BP to 103/88.  Laboratory studies were pertinent for magnesium 1.4, albumin 2.8, , ALT 78, total bilirubin 2.7.  Hemoglobin was 8.5 with normocytic normochromic indices (Hb was 13.2 on 2025).  WBC 4.0.  Platelets normal at 198.  Urinalysis was not consistent with infection.  CTA abdomen pelvis for GI bleed revealed no evidence of acute intestinal hemorrhage.  Note was made, however of diffuse significant fatty infiltration of the liver with significant hepatomegaly.  CXR was unremarkable.  Stool guaiac was negative.  ER provider ordered 1 L normal saline bolus and requested hospitalist admission for further evaluation and management.    Of note, she was hospitalized 12/3 through 24 for similar symptoms.  Per that discharge summary:  \"She does see gastroenterology in [place].  It looks like they see her for acute on chronic pancreatitis with pancreatic  138 (H) 35 - 104 U/L    Total Protein 6.7 6.3 - 8.2 g/dL    Albumin 2.8 (L) 3.5 - 5.0 g/dL    Globulin 3.9 (H) 2.3 - 3.5 g/dL    Albumin/Globulin Ratio 0.7 (L) 1.0 - 1.9     Troponin    Collection Time: 02/26/25  2:58 PM   Result Value Ref Range    Troponin T <6.0 0 - 14 ng/L   Urinalysis, Micro    Collection Time: 02/26/25  2:58 PM   Result Value Ref Range    WBC, UA 5-10 0 /hpf    RBC, UA 5-10 0 /hpf    Epithelial Cells, UA 10-20 0 /hpf    BACTERIA, URINE 1+ (H) 0 /hpf    Casts 0-3 0 /lpf    Mucus, UA 0 0 /lpf   Magnesium    Collection Time: 02/26/25  2:58 PM   Result Value Ref Range    Magnesium 1.4 (L) 1.8 - 2.4 mg/dL   POC Fecal Occult Blood    Collection Time: 02/26/25  4:28 PM   Result Value Ref Range    POC Occult Blood, Fecal Negative NEG         No results for input(s): \"COVID19\" in the last 72 hours.    XR CHEST (2 VW)    Result Date: 2/26/2025  Chest X-ray INDICATION: Chest Pain COMPARISON:  None TECHNIQUE: PA and lateral views of the chest were obtained. FINDINGS: The lungs are clear. There are no infiltrates or effusions.  The heart size is normal.  The bony thorax is intact.      No acute findings in the chest Electronically signed by Cem Duque        Signed:  Jerad Sharp M.D.   Hospitalist  02/26/25   5:12 PM     Walks 1 to 2 blocks, climbs 1 flight of stairs, ADLs able to walk 10 city blocks, climbs 1-2 flight of stairs, ADLs, house chores

## 2025-02-26 NOTE — ED PROVIDER NOTES
Appearance: Normal appearance. She is normal weight.   Cardiovascular:      Rate and Rhythm: Normal rate and regular rhythm.      Pulses:           Radial pulses are 2+ on the right side and 2+ on the left side.        Dorsalis pedis pulses are 2+ on the right side and 2+ on the left side.      Heart sounds: Normal heart sounds, S1 normal and S2 normal.   Pulmonary:      Effort: Pulmonary effort is normal.      Breath sounds: Normal breath sounds. No decreased breath sounds, wheezing, rhonchi or rales.   Abdominal:      General: Abdomen is flat. Bowel sounds are normal. There is no distension.      Palpations: Abdomen is soft.      Tenderness: There is no abdominal tenderness. There is no guarding or rebound.   Musculoskeletal:      Right lower leg: No edema.      Left lower leg: No edema.   Skin:     General: Skin is warm and dry.      Capillary Refill: Capillary refill takes less than 2 seconds.   Neurological:      General: No focal deficit present.      Mental Status: She is alert.      Sensory: Sensation is intact. No sensory deficit.      Motor: Motor function is intact. No weakness.   Psychiatric:         Mood and Affect: Mood normal.         Behavior: Behavior normal.         Thought Content: Thought content normal.        Procedures     Procedures    Orders placed during this emergency department visit:     Orders Placed This Encounter   Procedures    Culture, Urine    XR CHEST (2 VW)    CTA ABDOMEN PELVIS GI BLEED Additional Contrast? None    CBC with Auto Differential    Comprehensive Metabolic Panel    Troponin    Urinalysis, Micro    Magnesium    Lactic Acid    POC PREGNANCY UR-QUAL    POCT Urine Dipstick    Orthostatic blood pressure and pulse    POCT Urinalysis no Micro    POC Pregnancy Urine Qual    POC Fecal Occult Blood    EKG 12 Lead        Medications given during this emergency department visit:     Medications   magnesium sulfate 1000 mg in dextrose 5% 100 mL IVPB (1,000 mg IntraVENous New Bag  Value Ref Range    Magnesium 1.4 (L) 1.8 - 2.4 mg/dL   POCT Urinalysis no Micro   Result Value Ref Range    Specific Gravity, Urine, POC 1.020 1.001 - 1.023      pH, Urine, POC 6.5 5.0 - 9.0      Protein, Urine,  (A) NEG mg/dL    Glucose, UA  (A) NEG mg/dL    Ketones, Urine, POC TRACE (A) NEG mg/dL    Bilirubin, Urine, POC SMALL (A) NEG      Blood, UA POC Negative NEG      URINE UROBILINOGEN POC >=8.0 0.2 - 1.0 EU/dL    Nitrite, Urine, POC Negative NEG      Leukocyte Est, UA POC Negative NEG      Performed by: Rupal Osorio    POC Pregnancy Urine Qual   Result Value Ref Range    Preg Test, Ur Negative NEG     POC Fecal Occult Blood   Result Value Ref Range    POC Occult Blood, Fecal Negative NEG           XR CHEST (2 VW)   Final Result   No acute findings in the chest         Electronically signed by Cem Duque      CTA ABDOMEN PELVIS GI BLEED Additional Contrast? None    (Results Pending)                No results for input(s): \"COVID19\" in the last 72 hours.     Voice dictation software was used during the making of this note.  This software is not perfect and grammatical and other typographical errors may be present.  This note has not been completely proofread for errors.       Berto Nunez PA-C  02/26/25 8926

## 2025-02-27 VITALS
HEART RATE: 78 BPM | RESPIRATION RATE: 20 BRPM | WEIGHT: 173 LBS | DIASTOLIC BLOOD PRESSURE: 58 MMHG | BODY MASS INDEX: 29.53 KG/M2 | TEMPERATURE: 98.4 F | HEIGHT: 64 IN | SYSTOLIC BLOOD PRESSURE: 97 MMHG | OXYGEN SATURATION: 96 %

## 2025-02-27 LAB
ALBUMIN SERPL-MCNC: 2.4 G/DL (ref 3.5–5)
ALBUMIN/GLOB SERPL: 0.6 (ref 1–1.9)
ALP SERPL-CCNC: 115 U/L (ref 35–104)
ALT SERPL-CCNC: 56 U/L (ref 8–45)
ANION GAP SERPL CALC-SCNC: 9 MMOL/L (ref 7–16)
APTT PPP: 36.7 SEC (ref 23.3–37.4)
AST SERPL-CCNC: 245 U/L (ref 15–37)
BACTERIA URNS QL MICRO: NEGATIVE /HPF
BASOPHILS # BLD: 0.01 K/UL (ref 0–0.2)
BASOPHILS NFR BLD: 0.2 % (ref 0–2)
BILIRUB SERPL-MCNC: 2.3 MG/DL (ref 0–1.2)
BUN SERPL-MCNC: 3 MG/DL (ref 6–23)
CALCIUM SERPL-MCNC: 7.7 MG/DL (ref 8.8–10.2)
CHLORIDE SERPL-SCNC: 101 MMOL/L (ref 98–107)
CO2 SERPL-SCNC: 26 MMOL/L (ref 20–29)
CREAT SERPL-MCNC: 0.53 MG/DL (ref 0.6–1.1)
DIFFERENTIAL METHOD BLD: ABNORMAL
EKG ATRIAL RATE: 75 BPM
EKG DIAGNOSIS: NORMAL
EKG P AXIS: 43 DEGREES
EKG P-R INTERVAL: 157 MS
EKG Q-T INTERVAL: 411 MS
EKG QRS DURATION: 85 MS
EKG QTC CALCULATION (BAZETT): 456 MS
EKG R AXIS: 29 DEGREES
EKG T AXIS: 11 DEGREES
EKG VENTRICULAR RATE: 74 BPM
EOSINOPHIL # BLD: 0.04 K/UL (ref 0–0.8)
EOSINOPHIL NFR BLD: 0.9 % (ref 0.5–7.8)
EPI CELLS #/AREA URNS HPF: ABNORMAL /HPF
ERYTHROCYTE [DISTWIDTH] IN BLOOD BY AUTOMATED COUNT: 25.5 % (ref 11.9–14.6)
FERRITIN SERPL-MCNC: 106 NG/ML (ref 8–388)
GLOBULIN SER CALC-MCNC: 3.7 G/DL (ref 2.3–3.5)
GLUCOSE SERPL-MCNC: 147 MG/DL (ref 70–99)
HCT VFR BLD AUTO: 23.3 % (ref 35.8–46.3)
HCT VFR BLD AUTO: 24.8 % (ref 35.8–46.3)
HGB BLD-MCNC: 7.4 G/DL (ref 11.7–15.4)
HGB BLD-MCNC: 8.1 G/DL (ref 11.7–15.4)
HYALINE CASTS URNS QL MICRO: ABNORMAL /LPF
IMM GRANULOCYTES # BLD AUTO: 0.01 K/UL (ref 0–0.5)
IMM GRANULOCYTES NFR BLD AUTO: 0.2 % (ref 0–5)
IRON SATN MFR SERPL: 9 % (ref 20–50)
IRON SERPL-MCNC: 23 UG/DL (ref 35–100)
LYMPHOCYTES # BLD: 1.76 K/UL (ref 0.5–4.6)
LYMPHOCYTES NFR BLD: 41.3 % (ref 13–44)
MAGNESIUM SERPL-MCNC: 1.9 MG/DL (ref 1.8–2.4)
MCH RBC QN AUTO: 30.2 PG (ref 26.1–32.9)
MCHC RBC AUTO-ENTMCNC: 31.8 G/DL (ref 31.4–35)
MCV RBC AUTO: 95.1 FL (ref 82–102)
MONOCYTES # BLD: 0.47 K/UL (ref 0.1–1.3)
MONOCYTES NFR BLD: 11 % (ref 4–12)
NEUTS SEG # BLD: 1.97 K/UL (ref 1.7–8.2)
NEUTS SEG NFR BLD: 46.4 % (ref 43–78)
NRBC # BLD: 0 K/UL (ref 0–0.2)
PLATELET # BLD AUTO: 159 K/UL (ref 150–450)
PMV BLD AUTO: 11.5 FL (ref 9.4–12.3)
POTASSIUM SERPL-SCNC: 3.3 MMOL/L (ref 3.5–5.1)
PROT SERPL-MCNC: 6.1 G/DL (ref 6.3–8.2)
RBC # BLD AUTO: 2.45 M/UL (ref 4.05–5.2)
RBC #/AREA URNS HPF: ABNORMAL /HPF
SODIUM SERPL-SCNC: 136 MMOL/L (ref 136–145)
TIBC SERPL-MCNC: 264 UG/DL (ref 240–450)
TRANSFERRIN SERPL-MCNC: 218 MG/DL (ref 200–360)
UIBC SERPL-MCNC: 241 UG/DL (ref 112–347)
WBC # BLD AUTO: 4.3 K/UL (ref 4.3–11.1)
WBC URNS QL MICRO: ABNORMAL /HPF

## 2025-02-27 PROCEDURE — 6360000002 HC RX W HCPCS: Performed by: FAMILY MEDICINE

## 2025-02-27 PROCEDURE — 85025 COMPLETE CBC W/AUTO DIFF WBC: CPT

## 2025-02-27 PROCEDURE — 6370000000 HC RX 637 (ALT 250 FOR IP): Performed by: FAMILY MEDICINE

## 2025-02-27 PROCEDURE — 83540 ASSAY OF IRON: CPT

## 2025-02-27 PROCEDURE — 85730 THROMBOPLASTIN TIME PARTIAL: CPT

## 2025-02-27 PROCEDURE — 96366 THER/PROPH/DIAG IV INF ADDON: CPT

## 2025-02-27 PROCEDURE — 36415 COLL VENOUS BLD VENIPUNCTURE: CPT

## 2025-02-27 PROCEDURE — 2580000003 HC RX 258: Performed by: FAMILY MEDICINE

## 2025-02-27 PROCEDURE — 80053 COMPREHEN METABOLIC PANEL: CPT

## 2025-02-27 PROCEDURE — 83735 ASSAY OF MAGNESIUM: CPT

## 2025-02-27 PROCEDURE — 82728 ASSAY OF FERRITIN: CPT

## 2025-02-27 PROCEDURE — G0378 HOSPITAL OBSERVATION PER HR: HCPCS

## 2025-02-27 PROCEDURE — 85014 HEMATOCRIT: CPT

## 2025-02-27 PROCEDURE — 84466 ASSAY OF TRANSFERRIN: CPT

## 2025-02-27 PROCEDURE — 96376 TX/PRO/DX INJ SAME DRUG ADON: CPT

## 2025-02-27 PROCEDURE — 81015 MICROSCOPIC EXAM OF URINE: CPT

## 2025-02-27 PROCEDURE — 2500000003 HC RX 250 WO HCPCS: Performed by: FAMILY MEDICINE

## 2025-02-27 PROCEDURE — 93010 ELECTROCARDIOGRAM REPORT: CPT | Performed by: INTERNAL MEDICINE

## 2025-02-27 PROCEDURE — 96375 TX/PRO/DX INJ NEW DRUG ADDON: CPT

## 2025-02-27 PROCEDURE — 85018 HEMOGLOBIN: CPT

## 2025-02-27 RX ORDER — LIDOCAINE 4 G/G
1 PATCH TOPICAL DAILY
Qty: 30 EACH | Refills: 1 | Status: SHIPPED | OUTPATIENT
Start: 2025-02-28

## 2025-02-27 RX ORDER — POTASSIUM CHLORIDE 1500 MG/1
20 TABLET, EXTENDED RELEASE ORAL ONCE
Status: COMPLETED | OUTPATIENT
Start: 2025-02-27 | End: 2025-02-27

## 2025-02-27 RX ADMIN — MORPHINE SULFATE 2 MG: 2 INJECTION, SOLUTION INTRAMUSCULAR; INTRAVENOUS at 01:14

## 2025-02-27 RX ADMIN — POTASSIUM CHLORIDE 20 MEQ: 1500 TABLET, EXTENDED RELEASE ORAL at 08:32

## 2025-02-27 RX ADMIN — SODIUM CHLORIDE: 9 INJECTION, SOLUTION INTRAVENOUS at 05:35

## 2025-02-27 RX ADMIN — PANTOPRAZOLE SODIUM 40 MG: 40 INJECTION, POWDER, FOR SOLUTION INTRAVENOUS at 05:15

## 2025-02-27 RX ADMIN — MORPHINE SULFATE 2 MG: 2 INJECTION, SOLUTION INTRAMUSCULAR; INTRAVENOUS at 13:12

## 2025-02-27 ASSESSMENT — PAIN DESCRIPTION - LOCATION
LOCATION: ABDOMEN
LOCATION: BACK

## 2025-02-27 ASSESSMENT — PAIN SCALES - GENERAL
PAINLEVEL_OUTOF10: 5
PAINLEVEL_OUTOF10: 4
PAINLEVEL_OUTOF10: 7

## 2025-02-27 ASSESSMENT — PAIN DESCRIPTION - DESCRIPTORS: DESCRIPTORS: ACHING

## 2025-02-27 ASSESSMENT — PAIN DESCRIPTION - ORIENTATION: ORIENTATION: MID;LOWER

## 2025-02-27 NOTE — PROGRESS NOTES
4 Eyes Skin Assessment     NAME:  Roslyn Malik  YOB: 1990  MEDICAL RECORD NUMBER:  [id number]    The patient is being assessed for  Admission    I agree that at least one RN has performed a thorough Head to Toe Skin Assessment on the patient. ALL assessment sites listed below have been assessed.      Areas assessed by both nurses:    Head, Face, Ears, Shoulders, Back, Chest, Arms, Elbows, Hands, Sacrum. Buttock, Coccyx, Ischium, and Legs. Feet and Heels        Does the Patient have a Wound? No noted wound(s)       Stepan Prevention initiated by RN: No  Wound Care Orders initiated by RN: No    Pressure Injury (Stage 3,4, Unstageable, DTI, NWPT, and Complex wounds) if present, place Wound referral order by RN under : No    New Ostomies, if present place, Ostomy referral order under : No     Nurse 1 eSignature: Electronically signed by COSTA CEDEÑO RN on 2/26/25 at 11:53 PM EST    **SHARE this note so that the co-signing nurse can place an eSignature**    Nurse 2 eSignature: Electronically signed by CARLOS MARTINO RN on 2/27/25 at 12:08 AM EST

## 2025-02-27 NOTE — DISCHARGE SUMMARY
Hospitalist Discharge Summary   Admit Date:  2025  2:28 PM   DC Note date: 2025  Name:  Roslyn Malik   Age:  34 y.o.  Sex:  female  :  1990   MRN:  [id number]   [facility]:  [facility]  PCP:  Randall Douglas Jr., MD    Presenting Complaint: Cough and Lightheadedness     Initial Admission Diagnosis: Hematochezia [K92.1]  Hypomagnesemia [E83.42]  Transaminitis [R74.01]  Anemia, unspecified type [D64.9]     Problem List for this Hospitalization (present on admission):    Principal Problem:    Hematochezia  Active Problems:    Hypomagnesemia    Acute blood loss anemia    Near syncope    Orthostatic hypotension    Transaminitis    Hyperbilirubinemia    Hepatomegaly    History of alcohol dependence (HCC)  Resolved Problems:    * No resolved hospital problems. *      Hospital Course:  Roslyn Malik is a 34 y.o. female with medical history notable for alcohol dependence (reportedly abstinent) who presented to [facility] emergency department with complaint of chest pain and dizziness of 3 days duration.  Dizziness worse upon standing and better with lying supine.  Chest pain is described as pressure-like sensation with deep inspiration and without radiation.  She endorses 2-week history of hematochezia, but symptoms spontaneously resolved on .  She did not seek medical care during that time.     Vital signs on presentation were notable for initial BP of 93/55.  Orthostatics were positive, standing BP 72/38, after which she was placed back in the supine position with improvement in BP to 103/88.  Laboratory studies were pertinent for magnesium 1.4, albumin 2.8, , ALT 78, total bilirubin 2.7.  Hemoglobin was 8.5 with normocytic normochromic indices (Hb was 13.2 on 2025).  WBC 4.0.  Platelets normal at 198.  Urinalysis was not consistent with infection.  CTA abdomen pelvis for GI bleed revealed no evidence of acute intestinal hemorrhage.  Note was made, however of diffuse  Iron % Saturation 9 (L) 20 - 50 %    UIBC 241.0 112.0 - 347.0 ug/dL   Transferrin    Collection Time: 02/27/25  4:14 AM   Result Value Ref Range    Transferrin 218 200 - 360 mg/dL   Ferritin    Collection Time: 02/27/25  4:14 AM   Result Value Ref Range    Ferritin 106 8 - 388 NG/ML   CBC with Auto Differential    Collection Time: 02/27/25  4:14 AM   Result Value Ref Range    WBC 4.3 4.3 - 11.1 K/uL    RBC 2.45 (L) 4.05 - 5.2 M/uL    Hemoglobin 7.4 (L) 11.7 - 15.4 g/dL    Hematocrit 23.3 (L) 35.8 - 46.3 %    MCV 95.1 82.0 - 102.0 FL    MCH 30.2 26.1 - 32.9 PG    MCHC 31.8 31.4 - 35.0 g/dL    RDW 25.5 (H) 11.9 - 14.6 %    Platelets 159 150 - 450 K/uL    MPV 11.5 9.4 - 12.3 FL    nRBC 0.00 0.0 - 0.2 K/uL    Differential Type AUTOMATED      Neutrophils % 46.4 43.0 - 78.0 %    Lymphocytes % 41.3 13.0 - 44.0 %    Monocytes % 11.0 4.0 - 12.0 %    Eosinophils % 0.9 0.5 - 7.8 %    Basophils % 0.2 0.0 - 2.0 %    Immature Granulocytes % 0.2 0.0 - 5.0 %    Neutrophils Absolute 1.97 1.70 - 8.20 K/UL    Lymphocytes Absolute 1.76 0.50 - 4.60 K/UL    Monocytes Absolute 0.47 0.10 - 1.30 K/UL    Eosinophils Absolute 0.04 0.00 - 0.80 K/UL    Basophils Absolute 0.01 0.00 - 0.20 K/UL    Immature Granulocytes Absolute 0.01 0.0 - 0.5 K/UL   Comprehensive Metabolic Panel w/ Reflex to MG    Collection Time: 02/27/25  4:14 AM   Result Value Ref Range    Sodium 136 136 - 145 mmol/L    Potassium 3.3 (L) 3.5 - 5.1 mmol/L    Chloride 101 98 - 107 mmol/L    CO2 26 20 - 29 mmol/L    Anion Gap 9 7 - 16 mmol/L    Glucose 147 (H) 70 - 99 mg/dL    BUN 3 (L) 6 - 23 MG/DL    Creatinine 0.53 (L) 0.60 - 1.10 MG/DL    Est, Glom Filt Rate >90 >60 ml/min/1.73m2    Calcium 7.7 (L) 8.8 - 10.2 MG/DL    Total Bilirubin 2.3 (H) 0.0 - 1.2 MG/DL    ALT 56 (H) 8 - 45 U/L     (H) 15 - 37 U/L    Alk Phosphatase 115 (H) 35 - 104 U/L    Total Protein 6.1 (L) 6.3 - 8.2 g/dL    Albumin 2.4 (L) 3.5 - 5.0 g/dL    Globulin 3.7 (H) 2.3 - 3.5 g/dL    Albumin/Globulin

## 2025-02-28 LAB
BACTERIA SPEC CULT: NORMAL
SERVICE CMNT-IMP: NORMAL

## 2025-03-14 ENCOUNTER — HOSPITAL ENCOUNTER (INPATIENT)
Age: 35
LOS: 2 days | Discharge: HOME | DRG: 394 | End: 2025-03-16
Attending: HOSPITALIST | Admitting: HOSPITALIST
Payer: COMMERCIAL

## 2025-03-14 ENCOUNTER — APPOINTMENT (OUTPATIENT)
Dept: CT IMAGING | Age: 35
DRG: 394 | End: 2025-03-14
Payer: COMMERCIAL

## 2025-03-14 DIAGNOSIS — R19.5 FECAL OCCULT BLOOD TEST POSITIVE: ICD-10-CM

## 2025-03-14 DIAGNOSIS — E83.42 HYPOMAGNESEMIA: Primary | ICD-10-CM

## 2025-03-14 PROBLEM — K92.2 ACUTE GI BLEEDING: Status: ACTIVE | Noted: 2025-03-14

## 2025-03-14 LAB
ABO + RH BLD: NORMAL
ALBUMIN SERPL-MCNC: 2.7 G/DL (ref 3.5–5)
ALBUMIN/GLOB SERPL: 0.5 (ref 1–1.9)
ALP SERPL-CCNC: 203 U/L (ref 35–104)
ALT SERPL-CCNC: 60 U/L (ref 8–45)
AMMONIA PLAS-SCNC: 50 UMOL/L (ref 11–51)
ANION GAP SERPL CALC-SCNC: 16 MMOL/L (ref 7–16)
AST SERPL-CCNC: 229 U/L (ref 15–37)
BACTERIA URNS QL MICRO: ABNORMAL /HPF
BASOPHILS # BLD: 0.02 K/UL (ref 0–0.2)
BASOPHILS NFR BLD: 0.2 % (ref 0–2)
BILIRUB SERPL-MCNC: 5.5 MG/DL (ref 0–1.2)
BILIRUB UR QL: ABNORMAL
BLOOD GROUP ANTIBODIES SERPL: NORMAL
BUN SERPL-MCNC: 5 MG/DL (ref 6–23)
CALCIUM SERPL-MCNC: 8 MG/DL (ref 8.8–10.2)
CASTS URNS QL MICRO: ABNORMAL /LPF
CHLORIDE SERPL-SCNC: 94 MMOL/L (ref 98–107)
CO2 SERPL-SCNC: 25 MMOL/L (ref 20–29)
CREAT SERPL-MCNC: 0.66 MG/DL (ref 0.6–1.1)
CRYSTALS URNS QL MICRO: 0 /LPF
DIFFERENTIAL METHOD BLD: ABNORMAL
EOSINOPHIL # BLD: 0.08 K/UL (ref 0–0.8)
EOSINOPHIL NFR BLD: 0.8 % (ref 0.5–7.8)
EPI CELLS #/AREA URNS HPF: ABNORMAL /HPF
ERYTHROCYTE [DISTWIDTH] IN BLOOD BY AUTOMATED COUNT: 22.2 % (ref 11.9–14.6)
GLOBULIN SER CALC-MCNC: 5.1 G/DL (ref 2.3–3.5)
GLUCOSE SERPL-MCNC: 208 MG/DL (ref 70–99)
GLUCOSE UR QL STRIP.AUTO: 100 MG/DL
HCG UR QL: NEGATIVE
HCT VFR BLD AUTO: 25.9 % (ref 35.8–46.3)
HGB BLD-MCNC: 8.5 G/DL (ref 11.7–15.4)
IMM GRANULOCYTES # BLD AUTO: 0.04 K/UL (ref 0–0.5)
IMM GRANULOCYTES NFR BLD AUTO: 0.4 % (ref 0–5)
INR PPP: 1.5
KETONES UR-MCNC: 15 MG/DL
LEUKOCYTE ESTERASE UR QL STRIP: ABNORMAL
LIPASE SERPL-CCNC: 11 U/L (ref 13–60)
LYMPHOCYTES # BLD: 2.2 K/UL (ref 0.5–4.6)
LYMPHOCYTES NFR BLD: 22.8 % (ref 13–44)
MAGNESIUM SERPL-MCNC: 1.1 MG/DL (ref 1.8–2.4)
MAGNESIUM SERPL-MCNC: 3.2 MG/DL (ref 1.8–2.4)
MCH RBC QN AUTO: 30.6 PG (ref 26.1–32.9)
MCHC RBC AUTO-ENTMCNC: 32.8 G/DL (ref 31.4–35)
MCV RBC AUTO: 93.2 FL (ref 82–102)
MONOCYTES # BLD: 0.53 K/UL (ref 0.1–1.3)
MONOCYTES NFR BLD: 5.5 % (ref 4–12)
MUCOUS THREADS URNS QL MICRO: ABNORMAL /LPF
NEUTS SEG # BLD: 6.78 K/UL (ref 1.7–8.2)
NEUTS SEG NFR BLD: 70.3 % (ref 43–78)
NITRITE UR QL: POSITIVE
NRBC # BLD: 0 K/UL (ref 0–0.2)
PH UR: 6.5 (ref 5–9)
PLATELET # BLD AUTO: 228 K/UL (ref 150–450)
PMV BLD AUTO: 10.5 FL (ref 9.4–12.3)
POTASSIUM SERPL-SCNC: 4 MMOL/L (ref 3.5–5.1)
PROT SERPL-MCNC: 7.8 G/DL (ref 6.3–8.2)
PROT UR QL: 100 MG/DL
PROTHROMBIN TIME: 18.7 SEC (ref 11.3–14.9)
RBC # BLD AUTO: 2.78 M/UL (ref 4.05–5.2)
RBC # UR STRIP: NEGATIVE
RBC #/AREA URNS HPF: 0 /HPF
SERVICE CMNT-IMP: ABNORMAL
SODIUM SERPL-SCNC: 135 MMOL/L (ref 136–145)
SP GR UR: 1.02 (ref 1–1.02)
SPECIMEN EXP DATE BLD: NORMAL
UROBILINOGEN UR QL: 4 EU/DL (ref 0.2–1)
WBC # BLD AUTO: 9.7 K/UL (ref 4.3–11.1)
WBC URNS QL MICRO: ABNORMAL /HPF

## 2025-03-14 PROCEDURE — 86850 RBC ANTIBODY SCREEN: CPT

## 2025-03-14 PROCEDURE — 1100000000 HC RM PRIVATE

## 2025-03-14 PROCEDURE — 85610 PROTHROMBIN TIME: CPT

## 2025-03-14 PROCEDURE — 96366 THER/PROPH/DIAG IV INF ADDON: CPT

## 2025-03-14 PROCEDURE — 99285 EMERGENCY DEPT VISIT HI MDM: CPT

## 2025-03-14 PROCEDURE — 83690 ASSAY OF LIPASE: CPT

## 2025-03-14 PROCEDURE — 86900 BLOOD TYPING SEROLOGIC ABO: CPT

## 2025-03-14 PROCEDURE — 81001 URINALYSIS AUTO W/SCOPE: CPT

## 2025-03-14 PROCEDURE — 83735 ASSAY OF MAGNESIUM: CPT

## 2025-03-14 PROCEDURE — 96365 THER/PROPH/DIAG IV INF INIT: CPT

## 2025-03-14 PROCEDURE — 81025 URINE PREGNANCY TEST: CPT

## 2025-03-14 PROCEDURE — 86901 BLOOD TYPING SEROLOGIC RH(D): CPT

## 2025-03-14 PROCEDURE — 6360000004 HC RX CONTRAST MEDICATION

## 2025-03-14 PROCEDURE — 93005 ELECTROCARDIOGRAM TRACING: CPT

## 2025-03-14 PROCEDURE — 6360000002 HC RX W HCPCS

## 2025-03-14 PROCEDURE — 85025 COMPLETE CBC W/AUTO DIFF WBC: CPT

## 2025-03-14 PROCEDURE — 96361 HYDRATE IV INFUSION ADD-ON: CPT

## 2025-03-14 PROCEDURE — 82140 ASSAY OF AMMONIA: CPT

## 2025-03-14 PROCEDURE — 80053 COMPREHEN METABOLIC PANEL: CPT

## 2025-03-14 PROCEDURE — 74177 CT ABD & PELVIS W/CONTRAST: CPT

## 2025-03-14 PROCEDURE — 96376 TX/PRO/DX INJ SAME DRUG ADON: CPT

## 2025-03-14 PROCEDURE — 2580000003 HC RX 258

## 2025-03-14 RX ORDER — SODIUM CHLORIDE 0.9 % (FLUSH) 0.9 %
5-40 SYRINGE (ML) INJECTION EVERY 12 HOURS SCHEDULED
Status: DISCONTINUED | OUTPATIENT
Start: 2025-03-14 | End: 2025-03-16 | Stop reason: HOSPADM

## 2025-03-14 RX ORDER — ACETAMINOPHEN 325 MG/1
650 TABLET ORAL EVERY 6 HOURS PRN
Status: DISCONTINUED | OUTPATIENT
Start: 2025-03-14 | End: 2025-03-15

## 2025-03-14 RX ORDER — SODIUM CHLORIDE 9 MG/ML
INJECTION, SOLUTION INTRAVENOUS PRN
Status: DISCONTINUED | OUTPATIENT
Start: 2025-03-14 | End: 2025-03-16 | Stop reason: HOSPADM

## 2025-03-14 RX ORDER — ONDANSETRON 2 MG/ML
4 INJECTION INTRAMUSCULAR; INTRAVENOUS EVERY 6 HOURS PRN
Status: DISCONTINUED | OUTPATIENT
Start: 2025-03-14 | End: 2025-03-16 | Stop reason: HOSPADM

## 2025-03-14 RX ORDER — ONDANSETRON 4 MG/1
4 TABLET, ORALLY DISINTEGRATING ORAL EVERY 8 HOURS PRN
Status: DISCONTINUED | OUTPATIENT
Start: 2025-03-14 | End: 2025-03-16 | Stop reason: HOSPADM

## 2025-03-14 RX ORDER — MAGNESIUM SULFATE IN WATER 40 MG/ML
4000 INJECTION, SOLUTION INTRAVENOUS ONCE
Status: COMPLETED | OUTPATIENT
Start: 2025-03-14 | End: 2025-03-14

## 2025-03-14 RX ORDER — SODIUM CHLORIDE 0.9 % (FLUSH) 0.9 %
5-40 SYRINGE (ML) INJECTION PRN
Status: DISCONTINUED | OUTPATIENT
Start: 2025-03-14 | End: 2025-03-16 | Stop reason: HOSPADM

## 2025-03-14 RX ORDER — SODIUM CHLORIDE 9 MG/ML
INJECTION, SOLUTION INTRAVENOUS CONTINUOUS
Status: DISCONTINUED | OUTPATIENT
Start: 2025-03-14 | End: 2025-03-15 | Stop reason: ALTCHOICE

## 2025-03-14 RX ORDER — MAGNESIUM SULFATE IN WATER 40 MG/ML
2000 INJECTION, SOLUTION INTRAVENOUS PRN
Status: DISCONTINUED | OUTPATIENT
Start: 2025-03-14 | End: 2025-03-14

## 2025-03-14 RX ORDER — 0.9 % SODIUM CHLORIDE 0.9 %
1000 INTRAVENOUS SOLUTION INTRAVENOUS
Status: COMPLETED | OUTPATIENT
Start: 2025-03-14 | End: 2025-03-14

## 2025-03-14 RX ORDER — IOPAMIDOL 755 MG/ML
100 INJECTION, SOLUTION INTRAVASCULAR
Status: COMPLETED | OUTPATIENT
Start: 2025-03-14 | End: 2025-03-14

## 2025-03-14 RX ORDER — ACETAMINOPHEN 650 MG/1
650 SUPPOSITORY RECTAL EVERY 6 HOURS PRN
Status: DISCONTINUED | OUTPATIENT
Start: 2025-03-14 | End: 2025-03-15

## 2025-03-14 RX ADMIN — MAGNESIUM SULFATE HEPTAHYDRATE 4000 MG: 40 INJECTION, SOLUTION INTRAVENOUS at 19:03

## 2025-03-14 RX ADMIN — IOPAMIDOL 100 ML: 755 INJECTION, SOLUTION INTRAVENOUS at 17:44

## 2025-03-14 RX ADMIN — SODIUM CHLORIDE 1000 ML: 9 INJECTION, SOLUTION INTRAVENOUS at 17:17

## 2025-03-14 ASSESSMENT — LIFESTYLE VARIABLES
HOW MANY STANDARD DRINKS CONTAINING ALCOHOL DO YOU HAVE ON A TYPICAL DAY: 1 OR 2
HOW OFTEN DO YOU HAVE A DRINK CONTAINING ALCOHOL: 2-3 TIMES A WEEK

## 2025-03-14 ASSESSMENT — PAIN DESCRIPTION - ORIENTATION: ORIENTATION: LEFT

## 2025-03-14 ASSESSMENT — PAIN DESCRIPTION - LOCATION: LOCATION: FLANK;ABDOMEN

## 2025-03-14 ASSESSMENT — PAIN - FUNCTIONAL ASSESSMENT: PAIN_FUNCTIONAL_ASSESSMENT: 0-10

## 2025-03-14 ASSESSMENT — PAIN SCALES - GENERAL: PAINLEVEL_OUTOF10: 7

## 2025-03-14 NOTE — ED NOTES
Pt transferred to room for cardiac monitor. Report given to Maryam BRAVO. Transfer of care at this time.

## 2025-03-14 NOTE — ED TRIAGE NOTES
Pt states she was sent here from her PCP. Pt advises that she was admitted for low HGB/HCT, low BP, and blurred vision. Pt CO \"seeing spots like smoke coming up\" and L side pain since yesterday.

## 2025-03-14 NOTE — ED PROVIDER NOTES
Partner Violence: Not At Risk (6/2/2024)    Received from [facility]    Humiliation, Afraid, Rape, and Kick questionnaire     Fear of Current or Ex-Partner: No     Emotionally Abused: No     Physically Abused: No     Sexually Abused: No   Housing Stability: Low Risk  (3/15/2025)    Housing Stability Vital Sign     Unable to Pay for Housing in the Last Year: No     Number of Times Moved in the Last Year: 0     Homeless in the Last Year: No   Recent Concern: Housing Stability - High Risk (2/26/2025)    Housing Stability Vital Sign     Unable to Pay for Housing in the Last Year: Yes     Number of Times Moved in the Last Year: 1     Homeless in the Last Year: No        Current Discharge Medication List        CONTINUE these medications which have NOT CHANGED    Details   levETIRAcetam (KEPPRA) 500 MG tablet Take 1 tablet by mouth daily Indications: History of Seizures      propranolol (INDERAL) 20 MG tablet Take 1 tablet by mouth daily Indications: Feeling Anxious, Rapid Heartbeat      lidocaine 4 % external patch Place 1 patch onto the skin daily Apply to chest at site of pain.  Qty: 30 each, Refills: 1      ondansetron (ZOFRAN-ODT) 4 MG disintegrating tablet Take 1 tablet by mouth 3 times daily as needed for Nausea or Vomiting  Qty: 21 tablet, Refills: 0      pantoprazole (PROTONIX) 40 MG tablet Take 1 tablet by mouth 2 times daily (before meals)  Qty: 60 tablet, Refills: 0      famotidine (PEPCID) 20 MG tablet Take 1 tablet by mouth 2 times daily      pregabalin (LYRICA) 75 MG capsule Take 1 capsule by mouth 2 times daily.      diphenhydrAMINE (BENADRYL) 25 MG capsule Take 1 capsule by mouth every 6 hours as needed (nausea)      hydroxychloroquine (PLAQUENIL) 200 MG tablet Take by mouth daily      promethazine (PHENERGAN) 25 MG tablet Take 1 tablet by mouth every 6 hours as needed for Nausea      SUMAtriptan (IMITREX) 25 MG tablet Take 1 tablet by mouth once as needed for Migraine

## 2025-03-15 LAB
ALBUMIN SERPL-MCNC: 2.2 G/DL (ref 3.5–5)
ALBUMIN/GLOB SERPL: 0.5 (ref 1–1.9)
ALP SERPL-CCNC: 162 U/L (ref 35–104)
ALT SERPL-CCNC: 41 U/L (ref 8–45)
ANION GAP SERPL CALC-SCNC: 9 MMOL/L (ref 7–16)
APTT PPP: 38.1 SEC (ref 23.3–37.4)
AST SERPL-CCNC: 149 U/L (ref 15–37)
BASOPHILS # BLD: 0.02 K/UL (ref 0–0.2)
BASOPHILS NFR BLD: 0.3 % (ref 0–2)
BILIRUB SERPL-MCNC: 4.3 MG/DL (ref 0–1.2)
BUN SERPL-MCNC: 3 MG/DL (ref 6–23)
CALCIUM SERPL-MCNC: 7.7 MG/DL (ref 8.8–10.2)
CHLORIDE SERPL-SCNC: 103 MMOL/L (ref 98–107)
CO2 SERPL-SCNC: 24 MMOL/L (ref 20–29)
CREAT SERPL-MCNC: 0.47 MG/DL (ref 0.6–1.1)
DIFFERENTIAL METHOD BLD: ABNORMAL
EKG ATRIAL RATE: 62 BPM
EKG DIAGNOSIS: NORMAL
EKG P AXIS: 36 DEGREES
EKG P-R INTERVAL: 188 MS
EKG Q-T INTERVAL: 441 MS
EKG QRS DURATION: 97 MS
EKG QTC CALCULATION (BAZETT): 448 MS
EKG R AXIS: 21 DEGREES
EKG T AXIS: 24 DEGREES
EKG VENTRICULAR RATE: 62 BPM
EOSINOPHIL # BLD: 0.07 K/UL (ref 0–0.8)
EOSINOPHIL NFR BLD: 1 % (ref 0.5–7.8)
ERYTHROCYTE [DISTWIDTH] IN BLOOD BY AUTOMATED COUNT: 22.4 % (ref 11.9–14.6)
EST. AVERAGE GLUCOSE BLD GHB EST-MCNC: 134 MG/DL
GLOBULIN SER CALC-MCNC: 4.1 G/DL (ref 2.3–3.5)
GLUCOSE BLD STRIP.AUTO-MCNC: 221 MG/DL (ref 65–100)
GLUCOSE BLD STRIP.AUTO-MCNC: 224 MG/DL (ref 65–100)
GLUCOSE BLD STRIP.AUTO-MCNC: 249 MG/DL (ref 65–100)
GLUCOSE BLD STRIP.AUTO-MCNC: 256 MG/DL (ref 65–100)
GLUCOSE BLD STRIP.AUTO-MCNC: 282 MG/DL (ref 65–100)
GLUCOSE BLD STRIP.AUTO-MCNC: 317 MG/DL (ref 65–100)
GLUCOSE SERPL-MCNC: 213 MG/DL (ref 70–99)
HBA1C MFR BLD: 6.3 % (ref 0–5.6)
HCT VFR BLD AUTO: 23 % (ref 35.8–46.3)
HCT VFR BLD AUTO: 23.1 % (ref 35.8–46.3)
HCT VFR BLD AUTO: 25 % (ref 35.8–46.3)
HCT VFR BLD AUTO: 25 % (ref 35.8–46.3)
HCT VFR BLD AUTO: 25.6 % (ref 35.8–46.3)
HGB BLD-MCNC: 7.3 G/DL (ref 11.7–15.4)
HGB BLD-MCNC: 7.4 G/DL (ref 11.7–15.4)
HGB BLD-MCNC: 7.9 G/DL (ref 11.7–15.4)
HGB BLD-MCNC: 8 G/DL (ref 11.7–15.4)
HGB BLD-MCNC: 8 G/DL (ref 11.7–15.4)
IMM GRANULOCYTES # BLD AUTO: 0.02 K/UL (ref 0–0.5)
IMM GRANULOCYTES NFR BLD AUTO: 0.3 % (ref 0–5)
INR PPP: 1.5
IRON SATN MFR SERPL: ABNORMAL % (ref 20–50)
IRON SERPL-MCNC: 239 UG/DL (ref 35–100)
LACTATE SERPL-SCNC: 1.8 MMOL/L (ref 0.5–2)
LACTATE SERPL-SCNC: 2.3 MMOL/L (ref 0.5–2)
LACTATE SERPL-SCNC: 4.2 MMOL/L (ref 0.5–2)
LYMPHOCYTES # BLD: 1.68 K/UL (ref 0.5–4.6)
LYMPHOCYTES NFR BLD: 25 % (ref 13–44)
MCH RBC QN AUTO: 30.7 PG (ref 26.1–32.9)
MCHC RBC AUTO-ENTMCNC: 31.7 G/DL (ref 31.4–35)
MCV RBC AUTO: 96.6 FL (ref 82–102)
MONOCYTES # BLD: 0.39 K/UL (ref 0.1–1.3)
MONOCYTES NFR BLD: 5.8 % (ref 4–12)
NEUTS SEG # BLD: 4.55 K/UL (ref 1.7–8.2)
NEUTS SEG NFR BLD: 67.6 % (ref 43–78)
NRBC # BLD: 0 K/UL (ref 0–0.2)
PLATELET # BLD AUTO: 146 K/UL (ref 150–450)
PMV BLD AUTO: 10.3 FL (ref 9.4–12.3)
POTASSIUM SERPL-SCNC: 4.4 MMOL/L (ref 3.5–5.1)
PROT SERPL-MCNC: 6.4 G/DL (ref 6.3–8.2)
PROTHROMBIN TIME: 18.9 SEC (ref 11.3–14.9)
RBC # BLD AUTO: 2.38 M/UL (ref 4.05–5.2)
SERVICE CMNT-IMP: ABNORMAL
SODIUM SERPL-SCNC: 136 MMOL/L (ref 136–145)
TIBC SERPL-MCNC: ABNORMAL UG/DL (ref 240–450)
TRANSFERRIN SERPL-MCNC: 203 MG/DL (ref 200–360)
UIBC SERPL-MCNC: <16.8 UG/DL (ref 112–347)
WBC # BLD AUTO: 6.7 K/UL (ref 4.3–11.1)

## 2025-03-15 PROCEDURE — 6370000000 HC RX 637 (ALT 250 FOR IP): Performed by: INTERNAL MEDICINE

## 2025-03-15 PROCEDURE — 1100000000 HC RM PRIVATE

## 2025-03-15 PROCEDURE — 85014 HEMATOCRIT: CPT

## 2025-03-15 PROCEDURE — 36415 COLL VENOUS BLD VENIPUNCTURE: CPT

## 2025-03-15 PROCEDURE — 93010 ELECTROCARDIOGRAM REPORT: CPT | Performed by: INTERNAL MEDICINE

## 2025-03-15 PROCEDURE — 99222 1ST HOSP IP/OBS MODERATE 55: CPT | Performed by: INTERNAL MEDICINE

## 2025-03-15 PROCEDURE — 85025 COMPLETE CBC W/AUTO DIFF WBC: CPT

## 2025-03-15 PROCEDURE — 82962 GLUCOSE BLOOD TEST: CPT

## 2025-03-15 PROCEDURE — 85018 HEMOGLOBIN: CPT

## 2025-03-15 PROCEDURE — 84466 ASSAY OF TRANSFERRIN: CPT

## 2025-03-15 PROCEDURE — 83605 ASSAY OF LACTIC ACID: CPT

## 2025-03-15 PROCEDURE — 83540 ASSAY OF IRON: CPT

## 2025-03-15 PROCEDURE — 85610 PROTHROMBIN TIME: CPT

## 2025-03-15 PROCEDURE — 80053 COMPREHEN METABOLIC PANEL: CPT

## 2025-03-15 PROCEDURE — 83036 HEMOGLOBIN GLYCOSYLATED A1C: CPT

## 2025-03-15 PROCEDURE — 2500000003 HC RX 250 WO HCPCS: Performed by: HOSPITALIST

## 2025-03-15 PROCEDURE — 6370000000 HC RX 637 (ALT 250 FOR IP): Performed by: HOSPITALIST

## 2025-03-15 PROCEDURE — 2580000003 HC RX 258: Performed by: HOSPITALIST

## 2025-03-15 PROCEDURE — 6360000002 HC RX W HCPCS: Performed by: HOSPITALIST

## 2025-03-15 PROCEDURE — 85730 THROMBOPLASTIN TIME PARTIAL: CPT

## 2025-03-15 RX ORDER — INSULIN LISPRO 100 [IU]/ML
0-4 INJECTION, SOLUTION INTRAVENOUS; SUBCUTANEOUS
Status: DISCONTINUED | OUTPATIENT
Start: 2025-03-15 | End: 2025-03-15

## 2025-03-15 RX ORDER — IBUPROFEN 600 MG/1
1 TABLET ORAL PRN
Status: DISCONTINUED | OUTPATIENT
Start: 2025-03-15 | End: 2025-03-15

## 2025-03-15 RX ORDER — IBUPROFEN 600 MG/1
1 TABLET ORAL PRN
Status: DISCONTINUED | OUTPATIENT
Start: 2025-03-15 | End: 2025-03-16 | Stop reason: HOSPADM

## 2025-03-15 RX ORDER — MORPHINE SULFATE 2 MG/ML
2 INJECTION, SOLUTION INTRAMUSCULAR; INTRAVENOUS
Status: DISCONTINUED | OUTPATIENT
Start: 2025-03-15 | End: 2025-03-16 | Stop reason: HOSPADM

## 2025-03-15 RX ORDER — 0.9 % SODIUM CHLORIDE 0.9 %
500 INTRAVENOUS SOLUTION INTRAVENOUS ONCE
Status: DISCONTINUED | OUTPATIENT
Start: 2025-03-15 | End: 2025-03-16 | Stop reason: HOSPADM

## 2025-03-15 RX ORDER — LEVETIRACETAM 500 MG/1
500 TABLET ORAL DAILY
COMMUNITY
Start: 2024-10-01

## 2025-03-15 RX ORDER — DEXTROSE MONOHYDRATE 100 MG/ML
INJECTION, SOLUTION INTRAVENOUS CONTINUOUS PRN
Status: DISCONTINUED | OUTPATIENT
Start: 2025-03-15 | End: 2025-03-15

## 2025-03-15 RX ORDER — ARIPIPRAZOLE 5 MG/1
10 TABLET ORAL DAILY
Status: DISCONTINUED | OUTPATIENT
Start: 2025-03-15 | End: 2025-03-16 | Stop reason: HOSPADM

## 2025-03-15 RX ORDER — SODIUM CHLORIDE 9 MG/ML
INJECTION, SOLUTION INTRAVENOUS ONCE
Status: COMPLETED | OUTPATIENT
Start: 2025-03-15 | End: 2025-03-15

## 2025-03-15 RX ORDER — HYDROCORTISONE ACETATE 25 MG/1
25 SUPPOSITORY RECTAL 2 TIMES DAILY
Status: DISCONTINUED | OUTPATIENT
Start: 2025-03-15 | End: 2025-03-16 | Stop reason: HOSPADM

## 2025-03-15 RX ORDER — INSULIN LISPRO 100 [IU]/ML
8 INJECTION, SOLUTION INTRAVENOUS; SUBCUTANEOUS ONCE
Status: COMPLETED | OUTPATIENT
Start: 2025-03-15 | End: 2025-03-15

## 2025-03-15 RX ORDER — INSULIN LISPRO 100 [IU]/ML
0-8 INJECTION, SOLUTION INTRAVENOUS; SUBCUTANEOUS
Status: DISCONTINUED | OUTPATIENT
Start: 2025-03-15 | End: 2025-03-16 | Stop reason: HOSPADM

## 2025-03-15 RX ORDER — PROPRANOLOL HCL 20 MG
20 TABLET ORAL DAILY
COMMUNITY
Start: 2025-02-27

## 2025-03-15 RX ORDER — DEXTROSE MONOHYDRATE 100 MG/ML
INJECTION, SOLUTION INTRAVENOUS CONTINUOUS PRN
Status: DISCONTINUED | OUTPATIENT
Start: 2025-03-15 | End: 2025-03-16 | Stop reason: HOSPADM

## 2025-03-15 RX ORDER — LIDOCAINE 4 G/G
1 PATCH TOPICAL DAILY
Status: DISCONTINUED | OUTPATIENT
Start: 2025-03-15 | End: 2025-03-16 | Stop reason: HOSPADM

## 2025-03-15 RX ADMIN — INSULIN LISPRO 2 UNITS: 100 INJECTION, SOLUTION INTRAVENOUS; SUBCUTANEOUS at 21:56

## 2025-03-15 RX ADMIN — INSULIN LISPRO 1 UNITS: 100 INJECTION, SOLUTION INTRAVENOUS; SUBCUTANEOUS at 01:56

## 2025-03-15 RX ADMIN — PANTOPRAZOLE SODIUM 80 MG: 40 INJECTION, POWDER, FOR SOLUTION INTRAVENOUS at 01:17

## 2025-03-15 RX ADMIN — OCTREOTIDE ACETATE 25 MCG/HR: 500 INJECTION, SOLUTION INTRAVENOUS; SUBCUTANEOUS at 02:56

## 2025-03-15 RX ADMIN — INSULIN LISPRO 8 UNITS: 100 INJECTION, SOLUTION INTRAVENOUS; SUBCUTANEOUS at 16:05

## 2025-03-15 RX ADMIN — SODIUM CHLORIDE, PRESERVATIVE FREE 10 ML: 5 INJECTION INTRAVENOUS at 10:02

## 2025-03-15 RX ADMIN — SODIUM CHLORIDE, PRESERVATIVE FREE 10 ML: 5 INJECTION INTRAVENOUS at 21:56

## 2025-03-15 RX ADMIN — MORPHINE SULFATE 2 MG: 2 INJECTION, SOLUTION INTRAMUSCULAR; INTRAVENOUS at 23:06

## 2025-03-15 RX ADMIN — HYDROCORTISONE ACETATE 25 MG: 25 SUPPOSITORY RECTAL at 09:28

## 2025-03-15 RX ADMIN — HYDROCORTISONE ACETATE 25 MG: 25 SUPPOSITORY RECTAL at 10:05

## 2025-03-15 RX ADMIN — INSULIN LISPRO 1 UNITS: 100 INJECTION, SOLUTION INTRAVENOUS; SUBCUTANEOUS at 06:18

## 2025-03-15 RX ADMIN — Medication 3 MG: at 23:06

## 2025-03-15 RX ADMIN — INSULIN LISPRO 4 UNITS: 100 INJECTION, SOLUTION INTRAVENOUS; SUBCUTANEOUS at 16:49

## 2025-03-15 RX ADMIN — SODIUM CHLORIDE: 9 INJECTION, SOLUTION INTRAVENOUS at 01:42

## 2025-03-15 RX ADMIN — MORPHINE SULFATE 2 MG: 2 INJECTION, SOLUTION INTRAMUSCULAR; INTRAVENOUS at 09:58

## 2025-03-15 RX ADMIN — PANTOPRAZOLE SODIUM 40 MG: 40 INJECTION, POWDER, FOR SOLUTION INTRAVENOUS at 21:56

## 2025-03-15 RX ADMIN — HYDROCORTISONE ACETATE 25 MG: 25 SUPPOSITORY RECTAL at 21:56

## 2025-03-15 RX ADMIN — INSULIN LISPRO 2 UNITS: 100 INJECTION, SOLUTION INTRAVENOUS; SUBCUTANEOUS at 11:46

## 2025-03-15 RX ADMIN — SODIUM CHLORIDE, PRESERVATIVE FREE 10 ML: 5 INJECTION INTRAVENOUS at 00:32

## 2025-03-15 RX ADMIN — SODIUM CHLORIDE: 9 INJECTION, SOLUTION INTRAVENOUS at 02:55

## 2025-03-15 RX ADMIN — PANTOPRAZOLE SODIUM 40 MG: 40 INJECTION, POWDER, FOR SOLUTION INTRAVENOUS at 10:02

## 2025-03-15 ASSESSMENT — PAIN SCALES - GENERAL
PAINLEVEL_OUTOF10: 0
PAINLEVEL_OUTOF10: 6
PAINLEVEL_OUTOF10: 0
PAINLEVEL_OUTOF10: 0
PAINLEVEL_OUTOF10: 5
PAINLEVEL_OUTOF10: 2
PAINLEVEL_OUTOF10: 5

## 2025-03-15 ASSESSMENT — PAIN DESCRIPTION - DESCRIPTORS: DESCRIPTORS: SORE

## 2025-03-15 ASSESSMENT — PAIN DESCRIPTION - ORIENTATION
ORIENTATION: LEFT
ORIENTATION: LEFT

## 2025-03-15 ASSESSMENT — PAIN DESCRIPTION - LOCATION: LOCATION: FLANK

## 2025-03-15 NOTE — CARE COORDINATION
RNCM met with patient in room 374 to discuss discharge planning.     Patient presents to assessment alert and oriented, and answers questions appropriately.  Patient typically lives at home with her three kids (15, 13, and 8 years old).  At baseline, patient is independent with ADLs and does not use any assistive devices.     Demographics verified. Patient has [facility] insurance.  PCP is Dr. Randall Douglas.    At this time, anticipate patient to be discharged with no further case management needs. Case management will continue to follow.  Please notify if there are any changes.          03/15/25 8318   Service Assessment   Patient Orientation Alert and Oriented;Person;Place;Situation;Self   Cognition Alert   History Provided By Patient   Primary Caregiver Self   Accompanied By/Relationship n/a   Support Systems Children;Family Members   Patient's Healthcare Decision Maker is: Legal Next of Kin  (Consuelo Templeton, mother, [phone])   PCP Verified by CM Yes   Last Visit to PCP Within last 6 months   Prior Functional Level Independent in ADLs/IADLs   Current Functional Level Independent in ADLs/IADLs   Can patient return to prior living arrangement Yes   Ability to make needs known: Good   Family able to assist with home care needs: No   Would you like for me to discuss the discharge plan with any other family members/significant others, and if so, who? No   Financial Resources Other (Comment)  ([facility])   Community Resources None   Social/Functional History   Lives With Family   Type of Home Apartment   Receives Help From Family;Friend(s)   Active  Yes   Mode of Transportation Car   Occupation Full time employment   Discharge Planning   Type of Residence Apartment   Living Arrangements Children   Current Services Prior To Admission None   Potential Assistance Needed N/A   DME Ordered? No   Potential Assistance Purchasing Medications No   Type of Home Care Services None   Patient expects to be discharged

## 2025-03-15 NOTE — H&P
nodes. BOWEL: No bowel obstruction or inflammation. APPENDIX: Not visualized. No right lower quadrant inflammatory changes. Low-lying cecum. REPRODUCTIVE ORGANS: Unremarkable. PELVIC LYMPH NODES: No enlarged lymph nodes. VASCULATURE: Patent, normal caliber aortoiliac vasculature. PERITONEUM: No free fluid or free air. ABDOMINAL WALL/SOFT TISSUES: The soft tissues and musculature are unremarkable.No abdominal wall hernia. SKELETAL: No aggressive lytic or blastic lesion. No acute fracture. LUNG BASES: Clear      1.  No acute abdominopelvic pathology. 2.  Redemonstrated hepatomegaly with marked hepatic steatosis. 3.  Stable indeterminant low-attenuation subcapsular/pericapsular collection/lesion of the posterior superior left upper renal pole. Electronically signed by Sulema Perry MD        Signed:  Hussain Prajapati MD    Part of this note may have been written by using a voice dictation software.  The note has been proof read but may still contain some grammatical/other typographical errors.

## 2025-03-15 NOTE — ED NOTES
TRANSFER - OUT REPORT:    Verbal report given to Sierra BRAVO on Roslyn Malik  being transferred to [place] [address] for routine progression of patient care       Report consisted of patient's Situation, Background, Assessment and   Recommendations(SBAR).     Information from the following report(s) ED SBAR was reviewed with the receiving nurse.    Lines:   Peripheral IV 03/14/25 Left Antecubital (Active)   Site Assessment Clean, dry & intact 03/14/25 1702   Line Status Blood return noted 03/14/25 1702   Phlebitis Assessment No symptoms 03/14/25 1702   Infiltration Assessment 0 03/14/25 1702   Dressing Status New dressing applied;Clean, dry & intact 03/14/25 1702   Dressing Type Transparent 03/14/25 1702        Opportunity for questions and clarification was provided.      Patient transported with:  Registered Nurse

## 2025-03-15 NOTE — ACP (ADVANCE CARE PLANNING)
Advance Care Planning   General Advance Care Planning (ACP) Conversation    Date of Conversation: 3/14/2025  Conducted with: Patient with Decision Making Capacity  Other persons present: None    Healthcare Decision Maker:    Primary Decision Maker: Consuelo Templeton [facility] [phone]    Today we documented Decision Maker(s) consistent with Legal Next of Kin hierarchy.      Length of Voluntary ACP Conversation in minutes:  <16 minutes (Non-Billable)    Leilani Lawler RN

## 2025-03-15 NOTE — PROGRESS NOTES
Hospitalist Progress Note   Admit Date:  3/14/2025  4:20 PM   Name:  Roslyn Malik   Age:  34 y.o.  Sex:  female  :  1990   MRN:  [id number]   Room:  [facility]    Presenting/Chief Complaint: Blurred Vision and Side Pain     Reason(s) for Admission: Hypomagnesemia [E83.42]  Acute GI bleeding [K92.2]  Fecal occult blood test positive [R19.5]     Hospital Course:   Roslyn Malik is a 34 y.o. female with medical history of GI bleeds in the past, hypomagnesemia, history of alcohol dependence, history of chronic pancreatitis, cirrhosis of liver presented to emergency room with chief complaint of feeling dizzy, described as lightheadedness. Patient also reports hematochezia going on for past 1 week. Patient was recently admitted for hematochezia, patient actively following with [facility] gastroenterology and also a gastroenterologist in [place]. History of multiple scopes in the past. Most of her workup was negative. In emergency room her hemoglobin was 8.5 compared to past hemoglobin decreased.  Patient slightly hypotensive in emergency room but patient reports her blood pressure has been low lately for many months. ER physician requested admission of this patient for further evaluation and management.      Patient's blood pressure stabilized after fluids was given.  GI was consulted who evaluated her.  They noted that she had some internal hemorrhoids which is likely the source of her bleeding.  They recommended Anusol suppositories to help with her engorged internal hemorrhoid.      Subjective & 24hr Events:   Patient was seen and evaluated at bedside's morning.  Currently states that she feels better.  Denies any fevers, chills, shortness of breath, chest pain, nausea, vomiting.  No bowel movement overnight.    Patient consented for blood if needed.      Assessment & Plan:     Acute GI bleeding secondary to engorged internal hemorrhoid  Hemoglobin slightly low this morning which is likely dilutional

## 2025-03-15 NOTE — CONSENT
Informed Consent for Blood Component Transfusion Note    I have discussed with the patient the rationale for blood component transfusion; its benefits in treating or preventing fatigue, organ damage, or death; and its risk which includes mild transfusion reactions, rare risk of blood borne infection, or more serious but rare reactions. I have discussed the alternatives to transfusion, including the risk and consequences of not receiving transfusion. The patient had an opportunity to ask questions and had agreed to proceed with transfusion of blood components.    Electronically signed by Ja Villareal MD on 3/15/25 at 8:19 AM EDT

## 2025-03-15 NOTE — CONSULTS
Negative.    Respiratory:  Negative for shortness of breath.    Cardiovascular:  Negative for chest pain.   Gastrointestinal:  Positive for anal bleeding. Negative for abdominal distention, abdominal pain, blood in stool, constipation, diarrhea, nausea and vomiting.   Endocrine: Negative for cold intolerance and heat intolerance.   Musculoskeletal:  Negative for arthralgias.   Skin:  Negative for color change.   Neurological:  Negative for weakness.   Hematological:  Negative for adenopathy.   Psychiatric/Behavioral:  Negative for confusion.         PHYSICAL EXAMINATION    BP (!) 123/90   Pulse 62   Temp 97.7 °F (36.5 °C) (Oral)   Resp 22   Ht 1.626 m (5' 4\")   Wt 78.5 kg (173 lb)   SpO2 97%   BMI 29.70 kg/m²     Physical Exam  Constitutional:       Appearance: Normal appearance.   HENT:      Head: Normocephalic and atraumatic.   Eyes:      General: No scleral icterus.  Pulmonary:      Effort: No respiratory distress.   Abdominal:      General: There is no distension.   Genitourinary:     Comments: Chaperoned rectal exam was performed with patient's nurse.  1 small nonbleeding external hemorrhoid was noted.  She also had 1 engorged internal hemorrhoid.  No blood was noted after exam.  Skin:     Coloration: Skin is not jaundiced.   Neurological:      Mental Status: She is alert and oriented to person, place, and time.   Psychiatric:         Behavior: Behavior normal.         HPI:  This is a 34-year-old female who presented to the emergency department with 1 week of hematochezia.  She reports that she has had issues with hemorrhoid bleeding in the past and has undergone hemorrhoid banding without improvement and a single hemorrhoidectomy completed by Dr. Baig in [place].  She has a history of fatty liver and I suspect cirrhosis as well as chronic pancreatitis.    Ja Olguin DO

## 2025-03-15 NOTE — PROGRESS NOTES
Glucose level significantly high after lunch.  A1c 6.3%.    Will change to carb control diet and switch to medium dose sliding scale.    1 time dose of humalog 8 units ordered.

## 2025-03-15 NOTE — PLAN OF CARE
Problem: Discharge Planning  Goal: Discharge to home or other facility with appropriate resources  3/15/2025 1041 by Milly Onofre, RN  Outcome: Progressing  Flowsheets  Taken 3/15/2025 0900  Discharge to home or other facility with appropriate resources:   Identify barriers to discharge with patient and caregiver   Arrange for needed discharge resources and transportation as appropriate   Identify discharge learning needs (meds, wound care, etc)   Arrange for interpreters to assist at discharge as needed   Refer to discharge planning if patient needs post-hospital services based on physician order or complex needs related to functional status, cognitive ability or social support system  Taken 3/15/2025 0845  Discharge to home or other facility with appropriate resources:   Identify barriers to discharge with patient and caregiver   Arrange for needed discharge resources and transportation as appropriate   Identify discharge learning needs (meds, wound care, etc)   Arrange for interpreters to assist at discharge as needed   Refer to discharge planning if patient needs post-hospital services based on physician order or complex needs related to functional status, cognitive ability or social support system  3/15/2025 0058 by Erinn Olivia, RN  Outcome: Progressing  Flowsheets (Taken 3/15/2025 0012)  Discharge to home or other facility with appropriate resources: Identify barriers to discharge with patient and caregiver     Problem: Pain  Goal: Verbalizes/displays adequate comfort level or baseline comfort level  3/15/2025 1041 by Milly Onofre, RN  Outcome: Progressing  3/15/2025 0058 by Erinn Olivia, RN  Outcome: Progressing     Problem: Safety - Adult  Goal: Free from fall injury  3/15/2025 1041 by Milly Onofre, RN  Outcome: Progressing  3/15/2025 0058 by Erinn Olivia, RN  Outcome: Progressing

## 2025-03-15 NOTE — PROGRESS NOTES
4 Eyes Skin Assessment     NAME:  Roslyn Malik  YOB: 1990  MEDICAL RECORD NUMBER:  [id number]    The patient is being assessed for  Admission    I agree that at least one RN has performed a thorough Head to Toe Skin Assessment on the patient. ALL assessment sites listed below have been assessed.      Areas assessed by both nurses:    Head, Face, Ears, Shoulders, Back, Chest, Arms, Elbows, Hands, Sacrum. Buttock, Coccyx, Ischium, and Legs. Feet and Heels        Does the Patient have a Wound? No noted wound(s)       Stepan Prevention initiated by RN: Yes  Wound Care Orders initiated by RN: No    Pressure Injury (Stage 3,4, Unstageable, DTI, NWPT, and Complex wounds) if present, place Wound referral order by RN under : No    New Ostomies, if present place, Ostomy referral order under : No     Nurse 1 eSignature: Electronically signed by Erinn Olivia RN on 3/15/25 at 5:20 AM EDT    **SHARE this note so that the co-signing nurse can place an eSignature**    Nurse 2 eSignature: Electronically signed by Gloria Barajas RN on 3/15/25 at 5:48 AM EDT

## 2025-03-16 VITALS
HEART RATE: 59 BPM | SYSTOLIC BLOOD PRESSURE: 141 MMHG | TEMPERATURE: 98.1 F | DIASTOLIC BLOOD PRESSURE: 86 MMHG | OXYGEN SATURATION: 97 % | WEIGHT: 173 LBS | HEIGHT: 64 IN | RESPIRATION RATE: 14 BRPM | BODY MASS INDEX: 29.53 KG/M2

## 2025-03-16 PROBLEM — K64.8 INTERNAL HEMORRHOID, BLEEDING: Status: ACTIVE | Noted: 2025-03-16

## 2025-03-16 LAB
ALBUMIN SERPL-MCNC: 2.4 G/DL (ref 3.5–5)
ALBUMIN/GLOB SERPL: 0.5 (ref 1–1.9)
ALP SERPL-CCNC: 175 U/L (ref 35–104)
ALT SERPL-CCNC: 43 U/L (ref 8–45)
ANION GAP SERPL CALC-SCNC: 10 MMOL/L (ref 7–16)
AST SERPL-CCNC: 157 U/L (ref 15–37)
BASOPHILS # BLD: 0.03 K/UL (ref 0–0.2)
BASOPHILS NFR BLD: 0.3 % (ref 0–2)
BILIRUB SERPL-MCNC: 4.9 MG/DL (ref 0–1.2)
BUN SERPL-MCNC: <2 MG/DL (ref 6–23)
CALCIUM SERPL-MCNC: 8.8 MG/DL (ref 8.8–10.2)
CHLORIDE SERPL-SCNC: 99 MMOL/L (ref 98–107)
CO2 SERPL-SCNC: 23 MMOL/L (ref 20–29)
CREAT SERPL-MCNC: 0.5 MG/DL (ref 0.6–1.1)
DIFFERENTIAL METHOD BLD: ABNORMAL
EOSINOPHIL # BLD: 0.06 K/UL (ref 0–0.8)
EOSINOPHIL NFR BLD: 0.6 % (ref 0.5–7.8)
ERYTHROCYTE [DISTWIDTH] IN BLOOD BY AUTOMATED COUNT: 22 % (ref 11.9–14.6)
GLOBULIN SER CALC-MCNC: 4.5 G/DL (ref 2.3–3.5)
GLUCOSE BLD STRIP.AUTO-MCNC: 224 MG/DL (ref 65–100)
GLUCOSE BLD STRIP.AUTO-MCNC: 263 MG/DL (ref 65–100)
GLUCOSE SERPL-MCNC: 225 MG/DL (ref 70–99)
HCT VFR BLD AUTO: 25.4 % (ref 35.8–46.3)
HGB BLD-MCNC: 8.3 G/DL (ref 11.7–15.4)
IMM GRANULOCYTES # BLD AUTO: 0.12 K/UL (ref 0–0.5)
IMM GRANULOCYTES NFR BLD AUTO: 1.2 % (ref 0–5)
LYMPHOCYTES # BLD: 1.82 K/UL (ref 0.5–4.6)
LYMPHOCYTES NFR BLD: 17.7 % (ref 13–44)
MCH RBC QN AUTO: 32.2 PG (ref 26.1–32.9)
MCHC RBC AUTO-ENTMCNC: 32.7 G/DL (ref 31.4–35)
MCV RBC AUTO: 98.4 FL (ref 82–102)
MONOCYTES # BLD: 0.46 K/UL (ref 0.1–1.3)
MONOCYTES NFR BLD: 4.5 % (ref 4–12)
NEUTS SEG # BLD: 7.82 K/UL (ref 1.7–8.2)
NEUTS SEG NFR BLD: 75.7 % (ref 43–78)
NRBC # BLD: 0.03 K/UL (ref 0–0.2)
PLATELET # BLD AUTO: 181 K/UL (ref 150–450)
PMV BLD AUTO: 11.4 FL (ref 9.4–12.3)
POTASSIUM SERPL-SCNC: 5.5 MMOL/L (ref 3.5–5.1)
PROT SERPL-MCNC: 6.8 G/DL (ref 6.3–8.2)
RBC # BLD AUTO: 2.58 M/UL (ref 4.05–5.2)
SERVICE CMNT-IMP: ABNORMAL
SERVICE CMNT-IMP: ABNORMAL
SODIUM SERPL-SCNC: 132 MMOL/L (ref 136–145)
WBC # BLD AUTO: 10.3 K/UL (ref 4.3–11.1)

## 2025-03-16 PROCEDURE — 6370000000 HC RX 637 (ALT 250 FOR IP): Performed by: INTERNAL MEDICINE

## 2025-03-16 PROCEDURE — 2580000003 HC RX 258: Performed by: HOSPITALIST

## 2025-03-16 PROCEDURE — 6360000002 HC RX W HCPCS: Performed by: HOSPITALIST

## 2025-03-16 PROCEDURE — 85025 COMPLETE CBC W/AUTO DIFF WBC: CPT

## 2025-03-16 PROCEDURE — 2500000003 HC RX 250 WO HCPCS: Performed by: HOSPITALIST

## 2025-03-16 PROCEDURE — 36415 COLL VENOUS BLD VENIPUNCTURE: CPT

## 2025-03-16 PROCEDURE — 6370000000 HC RX 637 (ALT 250 FOR IP): Performed by: HOSPITALIST

## 2025-03-16 PROCEDURE — 80053 COMPREHEN METABOLIC PANEL: CPT

## 2025-03-16 PROCEDURE — 82962 GLUCOSE BLOOD TEST: CPT

## 2025-03-16 RX ORDER — LISINOPRIL 5 MG/1
10 TABLET ORAL DAILY
Status: DISCONTINUED | OUTPATIENT
Start: 2025-03-16 | End: 2025-03-16 | Stop reason: HOSPADM

## 2025-03-16 RX ORDER — HYDROCORTISONE ACETATE 25 MG/1
25 SUPPOSITORY RECTAL 2 TIMES DAILY
Qty: 24 SUPPOSITORY | Refills: 0 | Status: SHIPPED | OUTPATIENT
Start: 2025-03-16 | End: 2025-03-28

## 2025-03-16 RX ORDER — LISINOPRIL 10 MG/1
10 TABLET ORAL DAILY
Qty: 30 TABLET | Refills: 3 | Status: SHIPPED | OUTPATIENT
Start: 2025-03-17

## 2025-03-16 RX ORDER — PANTOPRAZOLE SODIUM 40 MG/1
40 TABLET, DELAYED RELEASE ORAL
Qty: 60 TABLET | Refills: 0 | Status: SHIPPED | OUTPATIENT
Start: 2025-03-16 | End: 2025-04-15

## 2025-03-16 RX ADMIN — SODIUM CHLORIDE, PRESERVATIVE FREE 10 ML: 5 INJECTION INTRAVENOUS at 09:57

## 2025-03-16 RX ADMIN — HYDROCORTISONE ACETATE 25 MG: 25 SUPPOSITORY RECTAL at 09:50

## 2025-03-16 RX ADMIN — MORPHINE SULFATE 2 MG: 2 INJECTION, SOLUTION INTRAMUSCULAR; INTRAVENOUS at 06:14

## 2025-03-16 RX ADMIN — LISINOPRIL 10 MG: 5 TABLET ORAL at 09:50

## 2025-03-16 RX ADMIN — PANTOPRAZOLE SODIUM 40 MG: 40 INJECTION, POWDER, FOR SOLUTION INTRAVENOUS at 11:21

## 2025-03-16 RX ADMIN — INSULIN LISPRO 4 UNITS: 100 INJECTION, SOLUTION INTRAVENOUS; SUBCUTANEOUS at 11:32

## 2025-03-16 RX ADMIN — INSULIN LISPRO 2 UNITS: 100 INJECTION, SOLUTION INTRAVENOUS; SUBCUTANEOUS at 08:07

## 2025-03-16 ASSESSMENT — PAIN DESCRIPTION - LOCATION
LOCATION: BACK;FLANK
LOCATION: FLANK

## 2025-03-16 ASSESSMENT — PAIN SCALES - GENERAL
PAINLEVEL_OUTOF10: 3
PAINLEVEL_OUTOF10: 7
PAINLEVEL_OUTOF10: 2

## 2025-03-16 ASSESSMENT — PAIN DESCRIPTION - ORIENTATION
ORIENTATION: LEFT;LOWER
ORIENTATION: LEFT

## 2025-03-16 ASSESSMENT — PAIN DESCRIPTION - DESCRIPTORS
DESCRIPTORS: SORE
DESCRIPTORS: ACHING;SORE

## 2025-03-16 ASSESSMENT — PAIN DESCRIPTION - PAIN TYPE: TYPE: CHRONIC PAIN

## 2025-03-16 NOTE — DISCHARGE SUMMARY
Hospitalist Discharge Summary   Admit Date:  3/14/2025  4:20 PM   DC Note date: 3/16/2025  Name:  Roslyn Malik   Age:  34 y.o.  Sex:  female  :  1990   MRN:  [id number]   Room:  [facility]  PCP:  Randall Douglas Jr., MD    Presenting Complaint: Blurred Vision and Side Pain     Initial Admission Diagnosis: Hypomagnesemia [E83.42]  Acute GI bleeding [K92.2]  Fecal occult blood test positive [R19.5]     Problem List for this Hospitalization (present on admission):    Principal Problem:    Acute GI bleeding  Active Problems:    Seizures (HCC)    Bipolar disorder (HCC)    History of alcohol dependence (HCC)    Internal hemorrhoid, bleeding  Resolved Problems:    * No resolved hospital problems. *      Hospital Course:  Roslyn Malik is a 34 y.o. female with medical history of GI bleeds in the past, hypomagnesemia, history of alcohol dependence, history of chronic pancreatitis, cirrhosis of liver presented to emergency room with chief complaint of feeling dizzy, described as lightheadedness. Patient also reports hematochezia going on for past 1 week. Patient was recently admitted for hematochezia, patient actively following with [facility] and also a gastroenterologist in [place]. History of multiple scopes in the past. Most of her workup was negative. In emergency room her hemoglobin was 8.5 compared to past hemoglobin decreased.  Patient slightly hypotensive in emergency room but patient reports her blood pressure has been low lately for many months. ER physician requested admission of this patient for further evaluation and management.       Patient's blood pressure stabilized after fluids was given.  GI was consulted who evaluated her.  They noted that she had some internal hemorrhoids which is likely the source of her bleeding.  They recommended Anusol suppositories to help with her engorged internal hemorrhoid.    Patient's hemoglobin was trended every 6 hours.  She experienced no further

## 2025-03-16 NOTE — CARE COORDINATION
Patient with discharge order for today. Patient discharging home with family support. Diabetes education resources provided. Patient has met all treatment goals and milestones for discharge. Patient/family in agreement with discharge plan. Family to provide transportation home. CM following until patient is discharged.        03/16/25 1209   Discharge Planning   Type of Residence Apartment   Living Arrangements Children   Current Services Prior To Admission None   Potential Assistance Needed N/A   DME Ordered? No   Potential Assistance Purchasing Medications No   Type of Home Care Services None   Patient expects to be discharged to: Apartment   History of falls? 0   Services At/After Discharge   Transition of Care Consult (CM Consult) Other  (Diabetes outpatient education resources)   Mode of Transport at Discharge Self   Confirm Follow Up Transport Family   Condition of Participation: Discharge Planning   The Plan for Transition of Care is related to the following treatment goals: return to functional baseline with family support in the home   The Patient and/or Patient Representative was provided with a Choice of Provider? Patient   The Patient and/Or Patient Representative agree with the Discharge Plan? Yes   Freedom of Choice list was provided with basic dialogue that supports the patient's individualized plan of care/goals, treatment preferences, and shares the quality data associated with the providers?  Yes

## 2025-03-16 NOTE — PLAN OF CARE
Problem: Discharge Planning  Goal: Discharge to home or other facility with appropriate resources  Outcome: Progressing  Flowsheets  Taken 3/16/2025 0800  Discharge to home or other facility with appropriate resources:   Identify barriers to discharge with patient and caregiver   Arrange for needed discharge resources and transportation as appropriate   Identify discharge learning needs (meds, wound care, etc)   Arrange for interpreters to assist at discharge as needed   Refer to discharge planning if patient needs post-hospital services based on physician order or complex needs related to functional status, cognitive ability or social support system  Taken 3/16/2025 0700  Discharge to home or other facility with appropriate resources:   Identify barriers to discharge with patient and caregiver   Arrange for needed discharge resources and transportation as appropriate   Identify discharge learning needs (meds, wound care, etc)   Arrange for interpreters to assist at discharge as needed   Refer to discharge planning if patient needs post-hospital services based on physician order or complex needs related to functional status, cognitive ability or social support system     Problem: Pain  Goal: Verbalizes/displays adequate comfort level or baseline comfort level  Outcome: Progressing  Flowsheets (Taken 3/16/2025 0800)  Verbalizes/displays adequate comfort level or baseline comfort level:   Encourage patient to monitor pain and request assistance   Assess pain using appropriate pain scale   Administer analgesics based on type and severity of pain and evaluate response   Implement non-pharmacological measures as appropriate and evaluate response   Consider cultural and social influences on pain and pain management   Notify Licensed Independent Practitioner if interventions unsuccessful or patient reports new pain     Problem: Safety - Adult  Goal: Free from fall injury  Outcome: Progressing

## 2025-04-01 ENCOUNTER — OFFICE VISIT (OUTPATIENT)
Dept: OBGYN CLINIC | Age: 35
End: 2025-04-01
Payer: COMMERCIAL

## 2025-04-01 DIAGNOSIS — N92.6 IRREGULAR PERIODS: ICD-10-CM

## 2025-04-01 DIAGNOSIS — Z20.2 POSSIBLE EXPOSURE TO STD: ICD-10-CM

## 2025-04-01 DIAGNOSIS — Z01.419 WELL WOMAN EXAM WITH ROUTINE GYNECOLOGICAL EXAM: Primary | ICD-10-CM

## 2025-04-01 DIAGNOSIS — Z12.4 SCREENING FOR CERVICAL CANCER: ICD-10-CM

## 2025-04-01 DIAGNOSIS — Z11.51 SCREENING FOR HPV (HUMAN PAPILLOMAVIRUS): ICD-10-CM

## 2025-04-01 DIAGNOSIS — Z11.3 SCREENING FOR STD (SEXUALLY TRANSMITTED DISEASE): ICD-10-CM

## 2025-04-01 DIAGNOSIS — Z11.3 SCREENING FOR STD (SEXUALLY TRANSMITTED DISEASE): Primary | ICD-10-CM

## 2025-04-01 PROCEDURE — 99459 PELVIC EXAMINATION: CPT | Performed by: OBSTETRICS & GYNECOLOGY

## 2025-04-01 PROCEDURE — 99395 PREV VISIT EST AGE 18-39: CPT | Performed by: OBSTETRICS & GYNECOLOGY

## 2025-04-01 RX ORDER — ESCITALOPRAM OXALATE 20 MG/1
20 TABLET ORAL DAILY
Status: ON HOLD | COMMUNITY

## 2025-04-01 ASSESSMENT — PATIENT HEALTH QUESTIONNAIRE - PHQ9
8. MOVING OR SPEAKING SO SLOWLY THAT OTHER PEOPLE COULD HAVE NOTICED. OR THE OPPOSITE, BEING SO FIGETY OR RESTLESS THAT YOU HAVE BEEN MOVING AROUND A LOT MORE THAN USUAL: NOT AT ALL
5. POOR APPETITE OR OVEREATING: NOT AT ALL
6. FEELING BAD ABOUT YOURSELF - OR THAT YOU ARE A FAILURE OR HAVE LET YOURSELF OR YOUR FAMILY DOWN: NOT AT ALL
SUM OF ALL RESPONSES TO PHQ QUESTIONS 1-9: 0
SUM OF ALL RESPONSES TO PHQ QUESTIONS 1-9: 0
3. TROUBLE FALLING OR STAYING ASLEEP: NOT AT ALL
9. THOUGHTS THAT YOU WOULD BE BETTER OFF DEAD, OR OF HURTING YOURSELF: NOT AT ALL
7. TROUBLE CONCENTRATING ON THINGS, SUCH AS READING THE NEWSPAPER OR WATCHING TELEVISION: NOT AT ALL
SUM OF ALL RESPONSES TO PHQ QUESTIONS 1-9: 0
4. FEELING TIRED OR HAVING LITTLE ENERGY: NOT AT ALL
1. LITTLE INTEREST OR PLEASURE IN DOING THINGS: NOT AT ALL
10. IF YOU CHECKED OFF ANY PROBLEMS, HOW DIFFICULT HAVE THESE PROBLEMS MADE IT FOR YOU TO DO YOUR WORK, TAKE CARE OF THINGS AT HOME, OR GET ALONG WITH OTHER PEOPLE: NOT DIFFICULT AT ALL
SUM OF ALL RESPONSES TO PHQ QUESTIONS 1-9: 0
2. FEELING DOWN, DEPRESSED OR HOPELESS: NOT AT ALL

## 2025-04-02 LAB
HBV SURFACE AG SER QL: NONREACTIVE
HCV AB SER QL: NONREACTIVE
HIV 1+2 AB+HIV1 P24 AG SERPL QL IA: NONREACTIVE
HIV 1/2 RESULT COMMENT: NORMAL
PROLACTIN SERPL-MCNC: 8.2 NG/ML (ref 4.8–23.3)
T PALLIDUM AB SER QL IA: NONREACTIVE
TSH W FREE THYROID IF ABNORMAL: 4.05 UIU/ML (ref 0.27–4.2)

## 2025-04-14 ENCOUNTER — RESULTS FOLLOW-UP (OUTPATIENT)
Dept: OBGYN CLINIC | Age: 35
End: 2025-04-14

## 2025-04-14 VITALS — DIASTOLIC BLOOD PRESSURE: 60 MMHG | SYSTOLIC BLOOD PRESSURE: 100 MMHG | WEIGHT: 173 LBS | BODY MASS INDEX: 29.7 KG/M2

## 2025-04-14 NOTE — PROGRESS NOTES
Annual Exam  Established pt.     Erenvirajjose Malik   34 y.o.  1990  [id number]    Today:  25    Patient requests:   well woman annual exam.  Reports:  she has ~ 4 periods/year, last ~ 3-4 d.     Periods are:  Regular, monthly, no menorrhagia, no dysmenorrhea      BC:   none.    Past Medical History:   Diagnosis Date    Abnormal Papanicolaou smear of cervix     2016 (postpartum) ASCUS, neg HPV     Anemia     C. difficile diarrhea 2021    COVID-19 vaccine series completed 2021    Moderna    Encounter for insertion of Mirena IUD 2017    Fatty liver     2022 [facility], MRI, severe fatty liver    Gastric varices 2024, Dr. Farfan, GI, endoscopic US:  POST-OP DIAGNOSIS: Chronic pancreatitis Pancreatic cyst Gastric varices/ collaterals    GERD (gastroesophageal reflux disease)     Headache     Hepatomegaly 2025    History of alcohol dependence (HCC)     Hypertension     Neuropathy 2024    Pancreatitis     PUD (peptic ulcer disease)     Seizures (HCC)     Trichomonas vaginitis 2020    pap w/ + trich       Past Surgical History:   Procedure Laterality Date    CHOLECYSTECTOMY  2021    Laparoscopic Cholecystectomy    COLPOSCOPY  Multiple    HEMORRHOID SURGERY      2023  & 2024    LAPAROSCOPY         Family History   Problem Relation Age of Onset    Thyroid Disease Mother     Migraines Mother     Hypertension Mother     Hypertension Maternal Grandmother     Diabetes Maternal Grandmother     Lung Disease Maternal Grandmother     Hypertension Maternal Grandfather     Diabetes Maternal Aunt     Diabetes Maternal Uncle     Ovarian Cancer Neg Hx     Colon Cancer Neg Hx     Breast Cancer Neg Hx     Headache Neg Hx     Bleeding Prob Neg Hx     Rheum Arthritis Neg Hx     Alcohol Abuse Neg Hx     Prostate Cancer Neg Hx     Deep Vein Thrombosis Neg Hx     Pulmonary Embolism Neg Hx        OB History    Para Term  AB Living   4 3 2 1 1 3   SAB IAB

## 2025-04-17 LAB
C TRACH RRNA CVX QL NAA+PROBE: NEGATIVE
COLLECTION METHOD: ABNORMAL
CYTOLOGIST CVX/VAG CYTO: ABNORMAL
CYTOLOGY CVX/VAG DOC THIN PREP: ABNORMAL
DATE OF LMP: ABNORMAL
HPV APTIMA: NEGATIVE
HPV GENOTYPE REFLEX: ABNORMAL
Lab: ABNORMAL
N GONORRHOEA RRNA CVX QL NAA+PROBE: NEGATIVE
PAP SOURCE: ABNORMAL
PATH REPORT.FINAL DX SPEC: ABNORMAL
PATHOLOGIST CVX/VAG CYTO: ABNORMAL
PATHOLOGIST PROVIDED ICD: ABNORMAL
STAT OF ADQ CVX/VAG CYTO-IMP: ABNORMAL
T VAGINALIS RRNA SPEC QL NAA+PROBE: NEGATIVE

## 2025-04-19 ENCOUNTER — HOSPITAL ENCOUNTER (INPATIENT)
Age: 35
LOS: 3 days | Discharge: HOME | DRG: 897 | End: 2025-04-22
Attending: HOSPITALIST | Admitting: HOSPITALIST
Payer: COMMERCIAL

## 2025-04-19 ENCOUNTER — APPOINTMENT (OUTPATIENT)
Dept: CT IMAGING | Age: 35
DRG: 897 | End: 2025-04-19
Payer: COMMERCIAL

## 2025-04-19 ENCOUNTER — APPOINTMENT (OUTPATIENT)
Dept: GENERAL RADIOLOGY | Age: 35
DRG: 897 | End: 2025-04-19
Payer: COMMERCIAL

## 2025-04-19 DIAGNOSIS — E83.42 HYPOMAGNESEMIA: ICD-10-CM

## 2025-04-19 DIAGNOSIS — R65.21 SEPTIC SHOCK (HCC): Primary | ICD-10-CM

## 2025-04-19 DIAGNOSIS — R00.1 BRADYCARDIA: ICD-10-CM

## 2025-04-19 DIAGNOSIS — A41.9 SEPTIC SHOCK (HCC): Primary | ICD-10-CM

## 2025-04-19 DIAGNOSIS — R11.2 NAUSEA AND VOMITING, UNSPECIFIED VOMITING TYPE: ICD-10-CM

## 2025-04-19 DIAGNOSIS — R74.01 TRANSAMINITIS: ICD-10-CM

## 2025-04-19 PROBLEM — R65.10 SIRS (SYSTEMIC INFLAMMATORY RESPONSE SYNDROME) (HCC): Status: ACTIVE | Noted: 2025-04-19

## 2025-04-19 PROBLEM — F10.20 ALCOHOL DEPENDENCE (HCC): Status: ACTIVE | Noted: 2025-04-19

## 2025-04-19 PROBLEM — E87.29 HIGH ANION GAP METABOLIC ACIDOSIS: Status: ACTIVE | Noted: 2025-04-19

## 2025-04-19 PROBLEM — E87.20 LACTIC ACIDOSIS: Status: ACTIVE | Noted: 2025-04-19

## 2025-04-19 LAB
ALBUMIN SERPL-MCNC: 3.7 G/DL (ref 3.5–5)
ALBUMIN/GLOB SERPL: 0.7 (ref 1–1.9)
ALP SERPL-CCNC: 175 U/L (ref 35–104)
ALT SERPL-CCNC: 44 U/L (ref 8–45)
ANION GAP SERPL CALC-SCNC: 28 MMOL/L (ref 7–16)
APPEARANCE UR: CLEAR
AST SERPL-CCNC: 171 U/L (ref 15–37)
BACTERIA URNS QL MICRO: ABNORMAL /HPF
BASOPHILS # BLD: 0.03 K/UL (ref 0–0.2)
BASOPHILS NFR BLD: 0.2 % (ref 0–2)
BILIRUB SERPL-MCNC: 2.5 MG/DL (ref 0–1.2)
BILIRUB UR QL: NEGATIVE
BUN SERPL-MCNC: 6 MG/DL (ref 6–23)
CALCIUM SERPL-MCNC: 9 MG/DL (ref 8.8–10.2)
CHLORIDE SERPL-SCNC: 101 MMOL/L (ref 98–107)
CO2 SERPL-SCNC: 18 MMOL/L (ref 20–29)
COLOR UR: ABNORMAL
CREAT SERPL-MCNC: 0.73 MG/DL (ref 0.6–1.1)
DIFFERENTIAL METHOD BLD: ABNORMAL
EOSINOPHIL # BLD: 0 K/UL (ref 0–0.8)
EOSINOPHIL NFR BLD: 0 % (ref 0.5–7.8)
EPI CELLS #/AREA URNS HPF: ABNORMAL /HPF
ERYTHROCYTE [DISTWIDTH] IN BLOOD BY AUTOMATED COUNT: 19.7 % (ref 11.9–14.6)
GLOBULIN SER CALC-MCNC: 5.6 G/DL (ref 2.3–3.5)
GLUCOSE SERPL-MCNC: 109 MG/DL (ref 70–99)
GLUCOSE UR STRIP.AUTO-MCNC: NEGATIVE MG/DL
HCG UR QL: NEGATIVE
HCT VFR BLD AUTO: 30 % (ref 35.8–46.3)
HGB BLD-MCNC: 9.2 G/DL (ref 11.7–15.4)
HGB UR QL STRIP: NEGATIVE
IMM GRANULOCYTES # BLD AUTO: 0.11 K/UL (ref 0–0.5)
IMM GRANULOCYTES NFR BLD AUTO: 0.6 % (ref 0–5)
KETONES UR QL STRIP.AUTO: ABNORMAL MG/DL
LACTATE SERPL-SCNC: 5.6 MMOL/L (ref 0.5–2)
LACTATE SERPL-SCNC: 8.6 MMOL/L (ref 0.5–2)
LEUKOCYTE ESTERASE UR QL STRIP.AUTO: NEGATIVE
LIPASE SERPL-CCNC: 4 U/L (ref 13–60)
LYMPHOCYTES # BLD: 0.95 K/UL (ref 0.5–4.6)
LYMPHOCYTES NFR BLD: 5.2 % (ref 13–44)
MAGNESIUM SERPL-MCNC: 0.8 MG/DL (ref 1.8–2.4)
MCH RBC QN AUTO: 26.6 PG (ref 26.1–32.9)
MCHC RBC AUTO-ENTMCNC: 30.7 G/DL (ref 31.4–35)
MCV RBC AUTO: 86.7 FL (ref 82–102)
MONOCYTES # BLD: 0.85 K/UL (ref 0.1–1.3)
MONOCYTES NFR BLD: 4.6 % (ref 4–12)
NEUTS SEG # BLD: 16.35 K/UL (ref 1.7–8.2)
NEUTS SEG NFR BLD: 89.4 % (ref 43–78)
NITRITE UR QL STRIP.AUTO: NEGATIVE
NRBC # BLD: 0 K/UL (ref 0–0.2)
OTHER OBSERVATIONS: ABNORMAL
PH UR STRIP: 5.5 (ref 5–9)
PLATELET # BLD AUTO: 426 K/UL (ref 150–450)
PMV BLD AUTO: 10.4 FL (ref 9.4–12.3)
POTASSIUM SERPL-SCNC: 3.6 MMOL/L (ref 3.5–5.1)
PROCALCITONIN SERPL-MCNC: 0.14 NG/ML (ref 0–0.1)
PROT SERPL-MCNC: 9.2 G/DL (ref 6.3–8.2)
PROT UR STRIP-MCNC: ABNORMAL MG/DL
RBC # BLD AUTO: 3.46 M/UL (ref 4.05–5.2)
SODIUM SERPL-SCNC: 147 MMOL/L (ref 136–145)
SP GR UR REFRACTOMETRY: >1.035 (ref 1–1.02)
TSH, 3RD GENERATION: 0.32 UIU/ML (ref 0.27–4.2)
UROBILINOGEN UR QL STRIP.AUTO: 1 EU/DL (ref 0.2–1)
WBC # BLD AUTO: 18.3 K/UL (ref 4.3–11.1)
WBC URNS QL MICRO: ABNORMAL /HPF

## 2025-04-19 PROCEDURE — 87040 BLOOD CULTURE FOR BACTERIA: CPT

## 2025-04-19 PROCEDURE — 2500000003 HC RX 250 WO HCPCS: Performed by: PHYSICIAN ASSISTANT

## 2025-04-19 PROCEDURE — 84443 ASSAY THYROID STIM HORMONE: CPT

## 2025-04-19 PROCEDURE — 80053 COMPREHEN METABOLIC PANEL: CPT

## 2025-04-19 PROCEDURE — 83735 ASSAY OF MAGNESIUM: CPT

## 2025-04-19 PROCEDURE — 96361 HYDRATE IV INFUSION ADD-ON: CPT

## 2025-04-19 PROCEDURE — 81025 URINE PREGNANCY TEST: CPT

## 2025-04-19 PROCEDURE — 6360000004 HC RX CONTRAST MEDICATION: Performed by: PHYSICIAN ASSISTANT

## 2025-04-19 PROCEDURE — 6370000000 HC RX 637 (ALT 250 FOR IP): Performed by: HOSPITALIST

## 2025-04-19 PROCEDURE — 99285 EMERGENCY DEPT VISIT HI MDM: CPT

## 2025-04-19 PROCEDURE — 2580000003 HC RX 258: Performed by: PHYSICIAN ASSISTANT

## 2025-04-19 PROCEDURE — 93005 ELECTROCARDIOGRAM TRACING: CPT | Performed by: HOSPITALIST

## 2025-04-19 PROCEDURE — 96374 THER/PROPH/DIAG INJ IV PUSH: CPT

## 2025-04-19 PROCEDURE — 6360000002 HC RX W HCPCS: Performed by: PHYSICIAN ASSISTANT

## 2025-04-19 PROCEDURE — 83605 ASSAY OF LACTIC ACID: CPT

## 2025-04-19 PROCEDURE — 81001 URINALYSIS AUTO W/SCOPE: CPT

## 2025-04-19 PROCEDURE — 96375 TX/PRO/DX INJ NEW DRUG ADDON: CPT

## 2025-04-19 PROCEDURE — 73000 X-RAY EXAM OF COLLAR BONE: CPT

## 2025-04-19 PROCEDURE — 84145 PROCALCITONIN (PCT): CPT

## 2025-04-19 PROCEDURE — 94761 N-INVAS EAR/PLS OXIMETRY MLT: CPT

## 2025-04-19 PROCEDURE — 2500000003 HC RX 250 WO HCPCS: Performed by: HOSPITALIST

## 2025-04-19 PROCEDURE — 71260 CT THORAX DX C+: CPT

## 2025-04-19 PROCEDURE — 74177 CT ABD & PELVIS W/CONTRAST: CPT

## 2025-04-19 PROCEDURE — 85025 COMPLETE CBC W/AUTO DIFF WBC: CPT

## 2025-04-19 PROCEDURE — 83690 ASSAY OF LIPASE: CPT

## 2025-04-19 PROCEDURE — 2000000000 HC ICU R&B

## 2025-04-19 RX ORDER — PANTOPRAZOLE SODIUM 40 MG/1
40 TABLET, DELAYED RELEASE ORAL
Status: DISCONTINUED | OUTPATIENT
Start: 2025-04-20 | End: 2025-04-21

## 2025-04-19 RX ORDER — LANOLIN ALCOHOL/MO/W.PET/CERES
400 CREAM (GRAM) TOPICAL 2 TIMES DAILY
Status: DISCONTINUED | OUTPATIENT
Start: 2025-04-20 | End: 2025-04-22 | Stop reason: HOSPADM

## 2025-04-19 RX ORDER — THIAMINE HYDROCHLORIDE 100 MG/ML
100 INJECTION, SOLUTION INTRAMUSCULAR; INTRAVENOUS DAILY
Status: DISCONTINUED | OUTPATIENT
Start: 2025-04-20 | End: 2025-04-21

## 2025-04-19 RX ORDER — LEVETIRACETAM 500 MG/1
500 TABLET ORAL DAILY
Status: DISCONTINUED | OUTPATIENT
Start: 2025-04-20 | End: 2025-04-22 | Stop reason: HOSPADM

## 2025-04-19 RX ORDER — METOPROLOL TARTRATE 1 MG/ML
5 INJECTION, SOLUTION INTRAVENOUS
Status: COMPLETED | OUTPATIENT
Start: 2025-04-19 | End: 2025-04-19

## 2025-04-19 RX ORDER — SODIUM CHLORIDE, SODIUM LACTATE, POTASSIUM CHLORIDE, AND CALCIUM CHLORIDE .6; .31; .03; .02 G/100ML; G/100ML; G/100ML; G/100ML
1000 INJECTION, SOLUTION INTRAVENOUS ONCE
Status: COMPLETED | OUTPATIENT
Start: 2025-04-19 | End: 2025-04-19

## 2025-04-19 RX ORDER — LORAZEPAM 2 MG/ML
3 INJECTION INTRAMUSCULAR ONCE
Status: DISCONTINUED | OUTPATIENT
Start: 2025-04-19 | End: 2025-04-19 | Stop reason: SDUPTHER

## 2025-04-19 RX ORDER — SODIUM CHLORIDE 0.9 % (FLUSH) 0.9 %
5-40 SYRINGE (ML) INJECTION EVERY 12 HOURS SCHEDULED
Status: DISCONTINUED | OUTPATIENT
Start: 2025-04-19 | End: 2025-04-22 | Stop reason: HOSPADM

## 2025-04-19 RX ORDER — ONDANSETRON 2 MG/ML
4 INJECTION INTRAMUSCULAR; INTRAVENOUS ONCE
Status: COMPLETED | OUTPATIENT
Start: 2025-04-19 | End: 2025-04-19

## 2025-04-19 RX ORDER — SODIUM CHLORIDE 0.9 % (FLUSH) 0.9 %
5-40 SYRINGE (ML) INJECTION PRN
Status: DISCONTINUED | OUTPATIENT
Start: 2025-04-19 | End: 2025-04-22 | Stop reason: HOSPADM

## 2025-04-19 RX ORDER — POTASSIUM CHLORIDE 7.45 MG/ML
10 INJECTION INTRAVENOUS PRN
Status: DISCONTINUED | OUTPATIENT
Start: 2025-04-19 | End: 2025-04-22 | Stop reason: HOSPADM

## 2025-04-19 RX ORDER — MAGNESIUM SULFATE IN WATER 40 MG/ML
2000 INJECTION, SOLUTION INTRAVENOUS
Status: COMPLETED | OUTPATIENT
Start: 2025-04-19 | End: 2025-04-19

## 2025-04-19 RX ORDER — SODIUM CHLORIDE 9 MG/ML
INJECTION, SOLUTION INTRAVENOUS PRN
Status: DISCONTINUED | OUTPATIENT
Start: 2025-04-19 | End: 2025-04-22 | Stop reason: HOSPADM

## 2025-04-19 RX ORDER — LORAZEPAM 2 MG/ML
2 INJECTION INTRAMUSCULAR EVERY 6 HOURS PRN
Status: DISCONTINUED | OUTPATIENT
Start: 2025-04-19 | End: 2025-04-19 | Stop reason: SDUPTHER

## 2025-04-19 RX ORDER — ESCITALOPRAM OXALATE 10 MG/1
20 TABLET ORAL DAILY
Status: DISCONTINUED | OUTPATIENT
Start: 2025-04-20 | End: 2025-04-22 | Stop reason: HOSPADM

## 2025-04-19 RX ORDER — ONDANSETRON 2 MG/ML
4 INJECTION INTRAMUSCULAR; INTRAVENOUS EVERY 6 HOURS PRN
Status: DISCONTINUED | OUTPATIENT
Start: 2025-04-19 | End: 2025-04-22 | Stop reason: HOSPADM

## 2025-04-19 RX ORDER — ACETAMINOPHEN 325 MG/1
650 TABLET ORAL EVERY 6 HOURS PRN
Status: DISCONTINUED | OUTPATIENT
Start: 2025-04-19 | End: 2025-04-22 | Stop reason: HOSPADM

## 2025-04-19 RX ORDER — LORAZEPAM 1 MG/1
2 TABLET ORAL
Status: DISCONTINUED | OUTPATIENT
Start: 2025-04-19 | End: 2025-04-22 | Stop reason: HOSPADM

## 2025-04-19 RX ORDER — HYDROXYCHLOROQUINE SULFATE 200 MG/1
200 TABLET, FILM COATED ORAL DAILY
Status: DISCONTINUED | OUTPATIENT
Start: 2025-04-20 | End: 2025-04-22 | Stop reason: HOSPADM

## 2025-04-19 RX ORDER — FOLIC ACID 1 MG/1
1 TABLET ORAL DAILY
Status: DISCONTINUED | OUTPATIENT
Start: 2025-04-20 | End: 2025-04-22 | Stop reason: HOSPADM

## 2025-04-19 RX ORDER — MORPHINE SULFATE 4 MG/ML
4 INJECTION, SOLUTION INTRAMUSCULAR; INTRAVENOUS
Refills: 0 | Status: COMPLETED | OUTPATIENT
Start: 2025-04-19 | End: 2025-04-19

## 2025-04-19 RX ORDER — LORAZEPAM 1 MG/1
1 TABLET ORAL
Status: DISCONTINUED | OUTPATIENT
Start: 2025-04-19 | End: 2025-04-22 | Stop reason: HOSPADM

## 2025-04-19 RX ORDER — IOPAMIDOL 755 MG/ML
100 INJECTION, SOLUTION INTRAVASCULAR
Status: COMPLETED | OUTPATIENT
Start: 2025-04-19 | End: 2025-04-19

## 2025-04-19 RX ORDER — LORAZEPAM 1 MG/1
4 TABLET ORAL
Status: DISCONTINUED | OUTPATIENT
Start: 2025-04-19 | End: 2025-04-22 | Stop reason: HOSPADM

## 2025-04-19 RX ORDER — MAGNESIUM HYDROXIDE/ALUMINUM HYDROXICE/SIMETHICONE 120; 1200; 1200 MG/30ML; MG/30ML; MG/30ML
30 SUSPENSION ORAL EVERY 6 HOURS PRN
Status: DISCONTINUED | OUTPATIENT
Start: 2025-04-19 | End: 2025-04-22 | Stop reason: HOSPADM

## 2025-04-19 RX ORDER — ONDANSETRON 4 MG/1
4 TABLET, ORALLY DISINTEGRATING ORAL EVERY 8 HOURS PRN
Status: DISCONTINUED | OUTPATIENT
Start: 2025-04-19 | End: 2025-04-22 | Stop reason: HOSPADM

## 2025-04-19 RX ORDER — LORAZEPAM 1 MG/1
3 TABLET ORAL
Status: DISCONTINUED | OUTPATIENT
Start: 2025-04-19 | End: 2025-04-22 | Stop reason: HOSPADM

## 2025-04-19 RX ORDER — FAMOTIDINE 20 MG/1
20 TABLET, FILM COATED ORAL 2 TIMES DAILY
Status: DISCONTINUED | OUTPATIENT
Start: 2025-04-20 | End: 2025-04-22 | Stop reason: HOSPADM

## 2025-04-19 RX ORDER — MAGNESIUM SULFATE IN WATER 40 MG/ML
2000 INJECTION, SOLUTION INTRAVENOUS PRN
Status: DISCONTINUED | OUTPATIENT
Start: 2025-04-19 | End: 2025-04-22 | Stop reason: HOSPADM

## 2025-04-19 RX ORDER — 0.9 % SODIUM CHLORIDE 0.9 %
1000 INTRAVENOUS SOLUTION INTRAVENOUS ONCE
Status: COMPLETED | OUTPATIENT
Start: 2025-04-19 | End: 2025-04-19

## 2025-04-19 RX ORDER — MAGNESIUM SULFATE IN WATER 40 MG/ML
4000 INJECTION, SOLUTION INTRAVENOUS ONCE
Status: COMPLETED | OUTPATIENT
Start: 2025-04-20 | End: 2025-04-20

## 2025-04-19 RX ORDER — METOCLOPRAMIDE HYDROCHLORIDE 5 MG/ML
10 INJECTION INTRAMUSCULAR; INTRAVENOUS
Status: COMPLETED | OUTPATIENT
Start: 2025-04-19 | End: 2025-04-19

## 2025-04-19 RX ORDER — ACETAMINOPHEN 650 MG/1
650 SUPPOSITORY RECTAL EVERY 6 HOURS PRN
Status: DISCONTINUED | OUTPATIENT
Start: 2025-04-19 | End: 2025-04-22 | Stop reason: HOSPADM

## 2025-04-19 RX ORDER — SODIUM CHLORIDE 9 MG/ML
INJECTION, SOLUTION INTRAVENOUS CONTINUOUS
Status: DISCONTINUED | OUTPATIENT
Start: 2025-04-19 | End: 2025-04-19

## 2025-04-19 RX ORDER — POLYETHYLENE GLYCOL 3350 17 G/17G
17 POWDER, FOR SOLUTION ORAL DAILY PRN
Status: DISCONTINUED | OUTPATIENT
Start: 2025-04-19 | End: 2025-04-22 | Stop reason: HOSPADM

## 2025-04-19 RX ORDER — POTASSIUM CHLORIDE 1500 MG/1
40 TABLET, EXTENDED RELEASE ORAL PRN
Status: DISCONTINUED | OUTPATIENT
Start: 2025-04-19 | End: 2025-04-22 | Stop reason: HOSPADM

## 2025-04-19 RX ADMIN — MAGNESIUM SULFATE HEPTAHYDRATE 2000 MG: 40 INJECTION, SOLUTION INTRAVENOUS at 21:10

## 2025-04-19 RX ADMIN — METOCLOPRAMIDE 10 MG: 5 INJECTION, SOLUTION INTRAMUSCULAR; INTRAVENOUS at 17:27

## 2025-04-19 RX ADMIN — VANCOMYCIN HYDROCHLORIDE 2000 MG: 10 INJECTION, POWDER, LYOPHILIZED, FOR SOLUTION INTRAVENOUS at 22:30

## 2025-04-19 RX ADMIN — METOPROLOL TARTRATE 5 MG: 5 INJECTION INTRAVENOUS at 20:14

## 2025-04-19 RX ADMIN — ACETAMINOPHEN 650 MG: 325 TABLET ORAL at 23:57

## 2025-04-19 RX ADMIN — IOPAMIDOL 100 ML: 755 INJECTION, SOLUTION INTRAVENOUS at 15:17

## 2025-04-19 RX ADMIN — ONDANSETRON 4 MG: 2 INJECTION, SOLUTION INTRAMUSCULAR; INTRAVENOUS at 16:17

## 2025-04-19 RX ADMIN — MORPHINE SULFATE 4 MG: 4 INJECTION, SOLUTION INTRAMUSCULAR; INTRAVENOUS at 16:18

## 2025-04-19 RX ADMIN — LORAZEPAM 3 MG: 2 INJECTION INTRAMUSCULAR; INTRAVENOUS at 18:26

## 2025-04-19 RX ADMIN — LORAZEPAM 2 MG: 2 INJECTION INTRAMUSCULAR; INTRAVENOUS at 21:06

## 2025-04-19 RX ADMIN — SODIUM CHLORIDE, SODIUM LACTATE, POTASSIUM CHLORIDE, AND CALCIUM CHLORIDE 1000 ML: .6; .31; .03; .02 INJECTION, SOLUTION INTRAVENOUS at 16:27

## 2025-04-19 RX ADMIN — PIPERACILLIN AND TAZOBACTAM 4500 MG: 4; .5 INJECTION, POWDER, FOR SOLUTION INTRAVENOUS; PARENTERAL at 21:13

## 2025-04-19 RX ADMIN — SODIUM CHLORIDE 1000 ML: 0.9 INJECTION, SOLUTION INTRAVENOUS at 16:18

## 2025-04-19 RX ADMIN — SODIUM CHLORIDE, PRESERVATIVE FREE 10 ML: 5 INJECTION INTRAVENOUS at 23:12

## 2025-04-19 RX ADMIN — SODIUM CHLORIDE: 0.9 INJECTION, SOLUTION INTRAVENOUS at 20:14

## 2025-04-19 ASSESSMENT — LIFESTYLE VARIABLES
HOW MANY STANDARD DRINKS CONTAINING ALCOHOL DO YOU HAVE ON A TYPICAL DAY: 3 OR 4
HOW OFTEN DO YOU HAVE A DRINK CONTAINING ALCOHOL: 2-3 TIMES A WEEK

## 2025-04-19 ASSESSMENT — PAIN SCALES - GENERAL
PAINLEVEL_OUTOF10: 8
PAINLEVEL_OUTOF10: 7

## 2025-04-19 ASSESSMENT — PAIN - FUNCTIONAL ASSESSMENT: PAIN_FUNCTIONAL_ASSESSMENT: PREVENTS OR INTERFERES SOME ACTIVE ACTIVITIES AND ADLS

## 2025-04-19 ASSESSMENT — PAIN DESCRIPTION - PAIN TYPE: TYPE: ACUTE PAIN

## 2025-04-19 ASSESSMENT — PAIN DESCRIPTION - ORIENTATION
ORIENTATION: LEFT
ORIENTATION: LEFT;UPPER
ORIENTATION: LEFT
ORIENTATION: LEFT

## 2025-04-19 ASSESSMENT — PAIN DESCRIPTION - FREQUENCY: FREQUENCY: INTERMITTENT

## 2025-04-19 ASSESSMENT — PAIN DESCRIPTION - LOCATION
LOCATION: CHEST
LOCATION: NECK

## 2025-04-19 NOTE — ED TRIAGE NOTES
Pt from home via EMS for L clavicle pain as well as swelling, 8/10 pain, generalized weakness hx of pancreatitis.

## 2025-04-19 NOTE — ED NOTES
Fluids still infusing at this time. Pt moved to room to have cardiac monitor due to elevated HR and increased pain to the L side of chest radiating into L side of neck. Pt anxious and tearful prior to administration of Reglan.

## 2025-04-19 NOTE — ED PROVIDER NOTES
In the shared visit with the PA I independently saw and evaluated the patient.  Patient is tachycardic into the 140s despite getting 3 mg of Ativan and 2 L of fluid.  She has a leukocytosis of 18 with no obvious source given a normal CT of her chest abdomen and pelvis.  Urine does not show any obvious evidence for a source of infection.  She does drink fairly heavily drinking 2 or 3 mini bottles at least twice a week.  She was able to be wide-awake and alert despite the 3 mg of Ativan.  Potentially there is a component to some alcohol withdrawal here but given her leukocytosis we are also worried about sepsis so we will add lactic acid and procalcitonin.    She notes that when she was here earlier in the month she was taken off of her propranolol.      Patient's lactic acid returned unexpectedly high at 8.6 I do not have any obvious source as her urine and CT scans are negative.  We will empirically treat with Zosyn and vancomycin    Patient's procalcitonin is only 0.14.  I think it is unlikely that she has a bacterial source of her sepsis but given the severity of her lactic acid elevation we will continue with the antibiotics.    We will discuss the case with the hospitalist for admission.     Jonah Morton MD  04/19/25 0837

## 2025-04-19 NOTE — ED PROVIDER NOTES
Emergency Department Provider Note       PCP: Randall Douglas Jr., MD   Age: 34 y.o.   Sex: female     DISPOSITION Admitted 04/19/2025 09:24:58 PM                ICD-10-CM    1. Septic shock (HCC)  A41.9     R65.21       2. Nausea and vomiting, unspecified vomiting type  R11.2       3. Hypomagnesemia  E83.42           Medical Decision Making     34-year-old female presents to the ER from home via EMS for left clavicle pain and swelling, 8/10 pain.  Patient states earlier today she had a pain in her left abdomen and then the left clavicle started swelling and she had pain.  Patient denies any falls or injuries.  States she has been having nausea and vomiting since last night.  Reports generalized weakness and has a history of pancreatitis.  Patient denies any fever, upper respiratory symptoms, shortness of breath, chest pain, diarrhea or urinary symptoms.    White blood cell count is elevated at 18.3.  Patient's anion gap is elevated at 28. ALT is elevated at 171.  Alk phos is elevated at 175.  Total bili is elevated at 2.5.    Discussed results with patient.  Patient states she is still feeling nauseous.  Will order Reglan.  Fluids are running.    Nurse states patient's heart rate increased to 132.  Patient was placed in a room on the monitor.  I went and saw patient and she states she does feel a little anxious.  States she has not had a drink in several days and the last time she drank she had to buzz balls.  Patient denies drinking every day.  Explained to patient we will give her some Ativan for the anxiousness.    Discussed case with Dr. Morton, ER attending.  He recommends giving patient Ativan 2 or 3 mg as patient could be entering DTs.    1934 pt's HR is 144 bpm. Discussed with Dr. Morton and he also evaluated pt. Dr. Morton recommended ordering procalcitonin, lactate, blood cultures as well as metoprolol 5mg IV and IV Fluids at 125 mL/hr.     Lab called with critical value for Lactic 8.6 and magnesium

## 2025-04-20 LAB
ALBUMIN SERPL-MCNC: 2.7 G/DL (ref 3.5–5)
ALBUMIN/GLOB SERPL: 0.7 (ref 1–1.9)
ALP SERPL-CCNC: 139 U/L (ref 35–104)
ALT SERPL-CCNC: 40 U/L (ref 8–45)
ANION GAP SERPL CALC-SCNC: 12 MMOL/L (ref 7–16)
AST SERPL-CCNC: 161 U/L (ref 15–37)
BASOPHILS # BLD: 0.03 K/UL (ref 0–0.2)
BASOPHILS NFR BLD: 0.4 % (ref 0–2)
BILIRUB SERPL-MCNC: 2.4 MG/DL (ref 0–1.2)
BUN SERPL-MCNC: 3 MG/DL (ref 6–23)
CALCIUM SERPL-MCNC: 7.8 MG/DL (ref 8.8–10.2)
CHLORIDE SERPL-SCNC: 102 MMOL/L (ref 98–107)
CO2 SERPL-SCNC: 25 MMOL/L (ref 20–29)
CREAT SERPL-MCNC: 0.57 MG/DL (ref 0.6–1.1)
DIFFERENTIAL METHOD BLD: ABNORMAL
EKG ATRIAL RATE: 117 BPM
EKG DIAGNOSIS: NORMAL
EKG P AXIS: 60 DEGREES
EKG P-R INTERVAL: 158 MS
EKG Q-T INTERVAL: 318 MS
EKG QRS DURATION: 66 MS
EKG QTC CALCULATION (BAZETT): 443 MS
EKG R AXIS: 21 DEGREES
EKG T AXIS: 46 DEGREES
EKG VENTRICULAR RATE: 117 BPM
EOSINOPHIL # BLD: 0 K/UL (ref 0–0.8)
EOSINOPHIL NFR BLD: 0 % (ref 0.5–7.8)
ERYTHROCYTE [DISTWIDTH] IN BLOOD BY AUTOMATED COUNT: 19.7 % (ref 11.9–14.6)
GLOBULIN SER CALC-MCNC: 4.1 G/DL (ref 2.3–3.5)
GLUCOSE SERPL-MCNC: 152 MG/DL (ref 70–99)
HCT VFR BLD AUTO: 34.3 % (ref 35.8–46.3)
HGB BLD-MCNC: 10.4 G/DL (ref 11.7–15.4)
IMM GRANULOCYTES # BLD AUTO: 0.05 K/UL (ref 0–0.5)
IMM GRANULOCYTES NFR BLD AUTO: 0.6 % (ref 0–5)
LACTATE SERPL-SCNC: 2.3 MMOL/L (ref 0.5–2)
LACTATE SERPL-SCNC: 3.2 MMOL/L (ref 0.5–2)
LYMPHOCYTES # BLD: 0.77 K/UL (ref 0.5–4.6)
LYMPHOCYTES NFR BLD: 9.7 % (ref 13–44)
MAGNESIUM SERPL-MCNC: 2 MG/DL (ref 1.8–2.4)
MCH RBC QN AUTO: 26.6 PG (ref 26.1–32.9)
MCHC RBC AUTO-ENTMCNC: 30.3 G/DL (ref 31.4–35)
MCV RBC AUTO: 87.7 FL (ref 82–102)
MONOCYTES # BLD: 0.64 K/UL (ref 0.1–1.3)
MONOCYTES NFR BLD: 8.1 % (ref 4–12)
NEUTS SEG # BLD: 6.44 K/UL (ref 1.7–8.2)
NEUTS SEG NFR BLD: 81.2 % (ref 43–78)
NRBC # BLD: 0 K/UL (ref 0–0.2)
PLATELET # BLD AUTO: 186 K/UL (ref 150–450)
PMV BLD AUTO: 11.3 FL (ref 9.4–12.3)
POTASSIUM SERPL-SCNC: 4.2 MMOL/L (ref 3.5–5.1)
PROT SERPL-MCNC: 6.7 G/DL (ref 6.3–8.2)
RBC # BLD AUTO: 3.91 M/UL (ref 4.05–5.2)
SODIUM SERPL-SCNC: 139 MMOL/L (ref 136–145)
WBC # BLD AUTO: 7.9 K/UL (ref 4.3–11.1)

## 2025-04-20 PROCEDURE — 6370000000 HC RX 637 (ALT 250 FOR IP): Performed by: INTERNAL MEDICINE

## 2025-04-20 PROCEDURE — 93010 ELECTROCARDIOGRAM REPORT: CPT | Performed by: INTERNAL MEDICINE

## 2025-04-20 PROCEDURE — 2000000000 HC ICU R&B

## 2025-04-20 PROCEDURE — 36415 COLL VENOUS BLD VENIPUNCTURE: CPT

## 2025-04-20 PROCEDURE — 83605 ASSAY OF LACTIC ACID: CPT

## 2025-04-20 PROCEDURE — 83735 ASSAY OF MAGNESIUM: CPT

## 2025-04-20 PROCEDURE — 2500000003 HC RX 250 WO HCPCS: Performed by: HOSPITALIST

## 2025-04-20 PROCEDURE — 2580000003 HC RX 258: Performed by: HOSPITALIST

## 2025-04-20 PROCEDURE — 6370000000 HC RX 637 (ALT 250 FOR IP): Performed by: FAMILY MEDICINE

## 2025-04-20 PROCEDURE — 2580000003 HC RX 258: Performed by: INTERNAL MEDICINE

## 2025-04-20 PROCEDURE — 6360000002 HC RX W HCPCS: Performed by: INTERNAL MEDICINE

## 2025-04-20 PROCEDURE — 80053 COMPREHEN METABOLIC PANEL: CPT

## 2025-04-20 PROCEDURE — 85025 COMPLETE CBC W/AUTO DIFF WBC: CPT

## 2025-04-20 PROCEDURE — 6370000000 HC RX 637 (ALT 250 FOR IP): Performed by: HOSPITALIST

## 2025-04-20 PROCEDURE — 6360000002 HC RX W HCPCS: Performed by: HOSPITALIST

## 2025-04-20 RX ORDER — METOPROLOL TARTRATE 1 MG/ML
5 INJECTION, SOLUTION INTRAVENOUS ONCE
Status: COMPLETED | OUTPATIENT
Start: 2025-04-20 | End: 2025-04-20

## 2025-04-20 RX ORDER — CETIRIZINE HYDROCHLORIDE 10 MG/1
10 TABLET ORAL DAILY
Status: DISCONTINUED | OUTPATIENT
Start: 2025-04-20 | End: 2025-04-22 | Stop reason: HOSPADM

## 2025-04-20 RX ORDER — KETOROLAC TROMETHAMINE 30 MG/ML
30 INJECTION, SOLUTION INTRAMUSCULAR; INTRAVENOUS EVERY 6 HOURS PRN
Status: DISCONTINUED | OUTPATIENT
Start: 2025-04-20 | End: 2025-04-21

## 2025-04-20 RX ORDER — SODIUM CHLORIDE 9 MG/ML
INJECTION, SOLUTION INTRAVENOUS CONTINUOUS
Status: DISCONTINUED | OUTPATIENT
Start: 2025-04-20 | End: 2025-04-21

## 2025-04-20 RX ORDER — SODIUM CHLORIDE, SODIUM LACTATE, POTASSIUM CHLORIDE, CALCIUM CHLORIDE 600; 310; 30; 20 MG/100ML; MG/100ML; MG/100ML; MG/100ML
INJECTION, SOLUTION INTRAVENOUS CONTINUOUS
Status: DISCONTINUED | OUTPATIENT
Start: 2025-04-20 | End: 2025-04-20

## 2025-04-20 RX ADMIN — SODIUM CHLORIDE, PRESERVATIVE FREE 10 ML: 5 INJECTION INTRAVENOUS at 19:36

## 2025-04-20 RX ADMIN — THIAMINE HYDROCHLORIDE 100 MG: 100 INJECTION, SOLUTION INTRAMUSCULAR; INTRAVENOUS at 08:09

## 2025-04-20 RX ADMIN — SODIUM CHLORIDE, PRESERVATIVE FREE 10 ML: 5 INJECTION INTRAVENOUS at 08:10

## 2025-04-20 RX ADMIN — Medication 6 MG: at 20:53

## 2025-04-20 RX ADMIN — ONDANSETRON 4 MG: 2 INJECTION, SOLUTION INTRAMUSCULAR; INTRAVENOUS at 11:56

## 2025-04-20 RX ADMIN — Medication 400 MG: at 08:09

## 2025-04-20 RX ADMIN — MAGNESIUM SULFATE HEPTAHYDRATE 4000 MG: 40 INJECTION, SOLUTION INTRAVENOUS at 00:33

## 2025-04-20 RX ADMIN — FAMOTIDINE 20 MG: 20 TABLET, FILM COATED ORAL at 08:09

## 2025-04-20 RX ADMIN — ONDANSETRON 4 MG: 2 INJECTION, SOLUTION INTRAMUSCULAR; INTRAVENOUS at 18:35

## 2025-04-20 RX ADMIN — FAMOTIDINE 20 MG: 20 TABLET, FILM COATED ORAL at 19:35

## 2025-04-20 RX ADMIN — Medication 400 MG: at 00:56

## 2025-04-20 RX ADMIN — LEVETIRACETAM 500 MG: 500 TABLET, FILM COATED ORAL at 08:09

## 2025-04-20 RX ADMIN — METOPROLOL TARTRATE 5 MG: 5 INJECTION INTRAVENOUS at 04:33

## 2025-04-20 RX ADMIN — SODIUM BICARBONATE: 84 INJECTION, SOLUTION INTRAVENOUS at 00:33

## 2025-04-20 RX ADMIN — KETOROLAC TROMETHAMINE 30 MG: 30 INJECTION, SOLUTION INTRAMUSCULAR at 11:55

## 2025-04-20 RX ADMIN — ESCITALOPRAM OXALATE 20 MG: 10 TABLET ORAL at 08:09

## 2025-04-20 RX ADMIN — FAMOTIDINE 20 MG: 20 TABLET, FILM COATED ORAL at 00:56

## 2025-04-20 RX ADMIN — PANTOPRAZOLE SODIUM 40 MG: 40 TABLET, DELAYED RELEASE ORAL at 06:00

## 2025-04-20 RX ADMIN — HYDROXYCHLOROQUINE SULFATE 200 MG: 200 TABLET ORAL at 08:09

## 2025-04-20 RX ADMIN — KETOROLAC TROMETHAMINE 30 MG: 30 INJECTION, SOLUTION INTRAMUSCULAR at 19:34

## 2025-04-20 RX ADMIN — SODIUM CHLORIDE: 0.9 INJECTION, SOLUTION INTRAVENOUS at 20:51

## 2025-04-20 RX ADMIN — ACETAMINOPHEN 650 MG: 325 TABLET ORAL at 08:13

## 2025-04-20 RX ADMIN — PANTOPRAZOLE SODIUM 40 MG: 40 TABLET, DELAYED RELEASE ORAL at 16:25

## 2025-04-20 RX ADMIN — CETIRIZINE HYDROCHLORIDE 10 MG: 10 TABLET, FILM COATED ORAL at 16:25

## 2025-04-20 RX ADMIN — FOLIC ACID 1 MG: 1 TABLET ORAL at 08:10

## 2025-04-20 RX ADMIN — Medication 400 MG: at 19:35

## 2025-04-20 RX ADMIN — SODIUM CHLORIDE: 0.9 INJECTION, SOLUTION INTRAVENOUS at 09:38

## 2025-04-20 ASSESSMENT — PAIN - FUNCTIONAL ASSESSMENT: PAIN_FUNCTIONAL_ASSESSMENT: ACTIVITIES ARE NOT PREVENTED

## 2025-04-20 ASSESSMENT — PAIN DESCRIPTION - PAIN TYPE: TYPE: CHRONIC PAIN

## 2025-04-20 ASSESSMENT — PAIN SCALES - GENERAL
PAINLEVEL_OUTOF10: 0
PAINLEVEL_OUTOF10: 6
PAINLEVEL_OUTOF10: 4
PAINLEVEL_OUTOF10: 0
PAINLEVEL_OUTOF10: 2

## 2025-04-20 ASSESSMENT — PAIN DESCRIPTION - ORIENTATION: ORIENTATION: LEFT

## 2025-04-20 ASSESSMENT — PAIN DESCRIPTION - LOCATION
LOCATION: NECK
LOCATION: NECK

## 2025-04-20 ASSESSMENT — PAIN DESCRIPTION - DESCRIPTORS: DESCRIPTORS: DISCOMFORT

## 2025-04-20 ASSESSMENT — PAIN DESCRIPTION - ONSET: ONSET: ON-GOING

## 2025-04-20 ASSESSMENT — PAIN DESCRIPTION - FREQUENCY: FREQUENCY: INTERMITTENT

## 2025-04-20 NOTE — PLAN OF CARE
Problem: Discharge Planning  Goal: Discharge to home or other facility with appropriate resources  Outcome: Progressing  Flowsheets (Taken 4/19/2025 2300)  Discharge to home or other facility with appropriate resources:   Identify barriers to discharge with patient and caregiver   Arrange for needed discharge resources and transportation as appropriate   Identify discharge learning needs (meds, wound care, etc)     Problem: Pain  Goal: Verbalizes/displays adequate comfort level or baseline comfort level  Outcome: Progressing  Flowsheets (Taken 4/19/2025 2300)  Verbalizes/displays adequate comfort level or baseline comfort level:   Encourage patient to monitor pain and request assistance   Assess pain using appropriate pain scale   Administer analgesics based on type and severity of pain and evaluate response     Problem: Safety - Adult  Goal: Free from fall injury  Outcome: Progressing     Problem: ABCDS Injury Assessment  Goal: Absence of physical injury  Outcome: Progressing

## 2025-04-20 NOTE — ED NOTES
TRANSFER - OUT REPORT:    Verbal report given to LAWRENCE Agarwal on Roslyn Malik  being transferred to [facility] for urgent transfer       Report consisted of patient's Situation, Background, Assessment and   Recommendations(SBAR).     Information from the following report(s) Nurse Handoff Report, ED Encounter Summary, ED SBAR, MAR, and Recent Results was reviewed with the receiving nurse.    Isabella Fall Assessment:    Presents to emergency department  because of falls (Syncope, seizure, or loss of consciousness): No  Age > 70: No  Altered Mental Status, Intoxication with alcohol or substance confusion (Disorientation, impaired judgment, poor safety awaremess, or inability to follow instructions): No  Impaired Mobility: Ambulates or transfers with assistive devices or assistance; Unable to ambulate or transer.: No  Nursing Judgement: No          Lines:   Peripheral IV 04/19/25 Right Antecubital (Active)       Peripheral IV 04/19/25 Left;Proximal Forearm (Active)   Site Assessment Clean, dry & intact 04/19/25 2003   Line Status Normal saline locked 04/19/25 2003   Phlebitis Assessment No symptoms 04/19/25 2003   Infiltration Assessment 0 04/19/25 2003   Alcohol Cap Used Yes 04/19/25 2003   Dressing Status Clean, dry & intact 04/19/25 2003   Dressing Type Transparent 04/19/25 2003        Opportunity for questions and clarification was provided.      Patient transported with:  Registered Nurse

## 2025-04-20 NOTE — H&P
Hospitalist History and Physical   Admit Date:  2025  2:14 PM   Name:  Roslyn Malik   Age:  34 y.o.  Sex:  female  :  1990   MRN:  [id number]   Room:  ER02/02    Presenting Complaint: Other     Reason(s) for Admission: SIRS (systemic inflammatory response syndrome) ([facility]) [R65.10]       Assessment & Plan:     Principal Problem:    SIRS (systemic inflammatory response syndrome) ([facility]) -34-year-old woman with history of alcohol dependence, gastric varices, chronic pancreatitis, presents to the ER with neck and abdominal pain along with nausea and vomiting and has been admitted for multiple electrolyte abnormalities and unstable vital signs meeting SIRS criteria.  She has elevated anion gap metabolic acidosis likely due to elevated lactic acid (8.6), but no obvious source of infection.  Patient has been given empiric antibiotics in the ER.    Active Problems:    Elevated LFTs      Hypomagnesemia      Gastric varices      Transaminitis      Alcohol dependence      High anion gap metabolic acidosis      Lactic acidosis      Plan:   Patient will be admitted to the intensive care unit, inpatient status  SIRS-we will continue with aggressive IV fluids and electrolyte replacement.  Patient has received empiric antibiotics in the ER.  Will hold off further antibiotics for now.  Anion gap metabolic acidosis-continue with IV fluids with bicarb.  Check BMP in AM.  Lactic acidosis-aggressive IV fluids.  Will continue to trend lactic acid  Transaminitis-due to alcohol dependence  Alcohol dependence-CIWA protocol.  She will be placed on thiamine and folate.  Hypomagnesemia-will continue to replace.  Likely due to alcohol abuse.  Plan of care discussed with the patient and all questions answered.          Anticipated discharge needs:     NONE    Diet: regular  VTE ppx: SCDs due to history of history of GI bleed and gastric varices  Code status: FULL      History of Present Illness:   34-year-old female, history

## 2025-04-21 LAB
ALBUMIN SERPL-MCNC: 2.4 G/DL (ref 3.5–5)
ALBUMIN/GLOB SERPL: 0.6 (ref 1–1.9)
ALP SERPL-CCNC: 120 U/L (ref 35–104)
ALT SERPL-CCNC: 26 U/L (ref 8–45)
ANION GAP SERPL CALC-SCNC: 7 MMOL/L (ref 7–16)
AST SERPL-CCNC: 112 U/L (ref 15–37)
BASOPHILS # BLD: 0 K/UL (ref 0–0.2)
BASOPHILS NFR BLD: 0 % (ref 0–2)
BILIRUB DIRECT SERPL-MCNC: 1.1 MG/DL (ref 0–0.3)
BILIRUB SERPL-MCNC: 1.6 MG/DL (ref 0–1.2)
BUN SERPL-MCNC: 4 MG/DL (ref 6–23)
CALCIUM SERPL-MCNC: 7.3 MG/DL (ref 8.8–10.2)
CHLORIDE SERPL-SCNC: 105 MMOL/L (ref 98–107)
CO2 SERPL-SCNC: 27 MMOL/L (ref 20–29)
CREAT SERPL-MCNC: 0.63 MG/DL (ref 0.6–1.1)
DIFFERENTIAL METHOD BLD: ABNORMAL
EOSINOPHIL # BLD: 0.01 K/UL (ref 0–0.8)
EOSINOPHIL NFR BLD: 0.2 % (ref 0.5–7.8)
ERYTHROCYTE [DISTWIDTH] IN BLOOD BY AUTOMATED COUNT: 19 % (ref 11.9–14.6)
GLOBULIN SER CALC-MCNC: 3.9 G/DL (ref 2.3–3.5)
GLUCOSE SERPL-MCNC: 136 MG/DL (ref 70–99)
HCT VFR BLD AUTO: 19.7 % (ref 35.8–46.3)
HCT VFR BLD AUTO: 20.3 % (ref 35.8–46.3)
HCT VFR BLD AUTO: 24.1 % (ref 35.8–46.3)
HCT VFR BLD AUTO: 25.7 % (ref 35.8–46.3)
HGB BLD-MCNC: 6 G/DL (ref 11.7–15.4)
HGB BLD-MCNC: 6.1 G/DL (ref 11.7–15.4)
HGB BLD-MCNC: 7.3 G/DL (ref 11.7–15.4)
HGB BLD-MCNC: 7.9 G/DL (ref 11.7–15.4)
HISTORY CHECK: NORMAL
IMM GRANULOCYTES # BLD AUTO: 0.02 K/UL (ref 0–0.5)
IMM GRANULOCYTES NFR BLD AUTO: 0.4 % (ref 0–5)
LACTATE SERPL-SCNC: 0.8 MMOL/L (ref 0.5–2)
LYMPHOCYTES # BLD: 1.08 K/UL (ref 0.5–4.6)
LYMPHOCYTES NFR BLD: 20.4 % (ref 13–44)
MAGNESIUM SERPL-MCNC: 2 MG/DL (ref 1.8–2.4)
MCH RBC QN AUTO: 27.1 PG (ref 26.1–32.9)
MCHC RBC AUTO-ENTMCNC: 30.5 G/DL (ref 31.4–35)
MCV RBC AUTO: 89.1 FL (ref 82–102)
MONOCYTES # BLD: 0.57 K/UL (ref 0.1–1.3)
MONOCYTES NFR BLD: 10.8 % (ref 4–12)
NEUTS SEG # BLD: 3.62 K/UL (ref 1.7–8.2)
NEUTS SEG NFR BLD: 68.2 % (ref 43–78)
NRBC # BLD: 0 K/UL (ref 0–0.2)
PLATELET # BLD AUTO: 178 K/UL (ref 150–450)
PMV BLD AUTO: 11.3 FL (ref 9.4–12.3)
POTASSIUM SERPL-SCNC: 3.9 MMOL/L (ref 3.5–5.1)
PROT SERPL-MCNC: 6.3 G/DL (ref 6.3–8.2)
RBC # BLD AUTO: 2.21 M/UL (ref 4.05–5.2)
SODIUM SERPL-SCNC: 139 MMOL/L (ref 136–145)
WBC # BLD AUTO: 5.3 K/UL (ref 4.3–11.1)

## 2025-04-21 PROCEDURE — 86901 BLOOD TYPING SEROLOGIC RH(D): CPT

## 2025-04-21 PROCEDURE — 86923 COMPATIBILITY TEST ELECTRIC: CPT

## 2025-04-21 PROCEDURE — 80048 BASIC METABOLIC PNL TOTAL CA: CPT

## 2025-04-21 PROCEDURE — P9016 RBC LEUKOCYTES REDUCED: HCPCS

## 2025-04-21 PROCEDURE — 85014 HEMATOCRIT: CPT

## 2025-04-21 PROCEDURE — 36415 COLL VENOUS BLD VENIPUNCTURE: CPT

## 2025-04-21 PROCEDURE — 85018 HEMOGLOBIN: CPT

## 2025-04-21 PROCEDURE — 85025 COMPLETE CBC W/AUTO DIFF WBC: CPT

## 2025-04-21 PROCEDURE — 6370000000 HC RX 637 (ALT 250 FOR IP): Performed by: INTERNAL MEDICINE

## 2025-04-21 PROCEDURE — 83735 ASSAY OF MAGNESIUM: CPT

## 2025-04-21 PROCEDURE — 80076 HEPATIC FUNCTION PANEL: CPT

## 2025-04-21 PROCEDURE — 83605 ASSAY OF LACTIC ACID: CPT

## 2025-04-21 PROCEDURE — 86850 RBC ANTIBODY SCREEN: CPT

## 2025-04-21 PROCEDURE — 86900 BLOOD TYPING SEROLOGIC ABO: CPT

## 2025-04-21 PROCEDURE — 2500000003 HC RX 250 WO HCPCS: Performed by: HOSPITALIST

## 2025-04-21 PROCEDURE — 6370000000 HC RX 637 (ALT 250 FOR IP): Performed by: FAMILY MEDICINE

## 2025-04-21 PROCEDURE — 6370000000 HC RX 637 (ALT 250 FOR IP): Performed by: HOSPITALIST

## 2025-04-21 PROCEDURE — 36430 TRANSFUSION BLD/BLD COMPNT: CPT

## 2025-04-21 PROCEDURE — 1100000003 HC PRIVATE W/ TELEMETRY

## 2025-04-21 RX ORDER — SODIUM CHLORIDE 9 MG/ML
INJECTION, SOLUTION INTRAVENOUS PRN
Status: DISCONTINUED | OUTPATIENT
Start: 2025-04-21 | End: 2025-04-21

## 2025-04-21 RX ORDER — SODIUM CHLORIDE 9 MG/ML
INJECTION, SOLUTION INTRAVENOUS CONTINUOUS
Status: DISCONTINUED | OUTPATIENT
Start: 2025-04-21 | End: 2025-04-21

## 2025-04-21 RX ORDER — DIPHENHYDRAMINE HCL 25 MG
25 CAPSULE ORAL SEE ADMIN INSTRUCTIONS
Status: COMPLETED | OUTPATIENT
Start: 2025-04-21 | End: 2025-04-21

## 2025-04-21 RX ORDER — LANOLIN ALCOHOL/MO/W.PET/CERES
100 CREAM (GRAM) TOPICAL DAILY
Status: DISCONTINUED | OUTPATIENT
Start: 2025-04-21 | End: 2025-04-22 | Stop reason: HOSPADM

## 2025-04-21 RX ORDER — ACETAMINOPHEN 325 MG/1
650 TABLET ORAL SEE ADMIN INSTRUCTIONS
Status: COMPLETED | OUTPATIENT
Start: 2025-04-21 | End: 2025-04-21

## 2025-04-21 RX ADMIN — SODIUM CHLORIDE, PRESERVATIVE FREE 10 ML: 5 INJECTION INTRAVENOUS at 21:10

## 2025-04-21 RX ADMIN — LEVETIRACETAM 500 MG: 500 TABLET, FILM COATED ORAL at 08:43

## 2025-04-21 RX ADMIN — DICLOFENAC SODIUM 2 G: 10 GEL TOPICAL at 10:42

## 2025-04-21 RX ADMIN — CETIRIZINE HYDROCHLORIDE 10 MG: 10 TABLET, FILM COATED ORAL at 08:43

## 2025-04-21 RX ADMIN — DIPHENHYDRAMINE HYDROCHLORIDE 25 MG: 25 CAPSULE ORAL at 06:04

## 2025-04-21 RX ADMIN — Medication 100 MG: at 08:43

## 2025-04-21 RX ADMIN — HYDROXYCHLOROQUINE SULFATE 200 MG: 200 TABLET ORAL at 08:43

## 2025-04-21 RX ADMIN — FAMOTIDINE 20 MG: 20 TABLET, FILM COATED ORAL at 08:43

## 2025-04-21 RX ADMIN — PANTOPRAZOLE SODIUM 40 MG: 40 TABLET, DELAYED RELEASE ORAL at 05:18

## 2025-04-21 RX ADMIN — SODIUM CHLORIDE, PRESERVATIVE FREE 10 ML: 5 INJECTION INTRAVENOUS at 08:43

## 2025-04-21 RX ADMIN — ACETAMINOPHEN 650 MG: 325 TABLET, FILM COATED ORAL at 06:03

## 2025-04-21 RX ADMIN — SODIUM CHLORIDE, PRESERVATIVE FREE 10 ML: 5 INJECTION INTRAVENOUS at 21:09

## 2025-04-21 RX ADMIN — Medication 400 MG: at 08:43

## 2025-04-21 RX ADMIN — Medication 6 MG: at 21:09

## 2025-04-21 RX ADMIN — ESCITALOPRAM OXALATE 20 MG: 10 TABLET ORAL at 08:43

## 2025-04-21 RX ADMIN — DICLOFENAC SODIUM 2 G: 10 GEL TOPICAL at 21:10

## 2025-04-21 RX ADMIN — Medication 400 MG: at 21:09

## 2025-04-21 RX ADMIN — FAMOTIDINE 20 MG: 20 TABLET, FILM COATED ORAL at 21:09

## 2025-04-21 RX ADMIN — FOLIC ACID 1 MG: 1 TABLET ORAL at 08:43

## 2025-04-21 ASSESSMENT — PAIN SCALES - GENERAL
PAINLEVEL_OUTOF10: 0
PAINLEVEL_OUTOF10: 0
PAINLEVEL_OUTOF10: 5
PAINLEVEL_OUTOF10: 0
PAINLEVEL_OUTOF10: 5
PAINLEVEL_OUTOF10: 0
PAINLEVEL_OUTOF10: 0
PAINLEVEL_OUTOF10: 6

## 2025-04-21 ASSESSMENT — PAIN - FUNCTIONAL ASSESSMENT: PAIN_FUNCTIONAL_ASSESSMENT: ACTIVITIES ARE NOT PREVENTED

## 2025-04-21 ASSESSMENT — PAIN DESCRIPTION - LOCATION
LOCATION: NECK
LOCATION: NECK

## 2025-04-21 ASSESSMENT — PAIN DESCRIPTION - ORIENTATION: ORIENTATION: LEFT

## 2025-04-21 NOTE — CARE COORDINATION
DINESH TEIXEIRA completed chart review and met with pt at bedside for initial CM assessment. Pt admitted with hypomagnesemia, SIRS, septic shock, and n/v. Pt alert and oriented x 4. Demographics, insurance, and PCP discussed and verified. PCP NP Mayra Moreno, last visit 2/20/25. Pt lives with three minor children in an apartment. Pt states children to be in the care of her mother while hospitalized. Pt independent with ADLs, full-time employee, and an active  in the community PTA. No DME utilized at baseline. No history of HH/STR. Pt with local supportive family. No falls reported. Family plans to provide transportation at discharge.     SW consult placed for potential rehab. Pt on CIWA protocol. CM asked pt about current alcohol habits. Pt reports having two small drinks approximately every two weeks. Pt did not verbalize having problem with alcoholism.     Pt will require MD note for employment.     DC/POC to return home with family when medically stable.    CM team to continue to follow for any needs that may arise.     Rhianna Clark, SHERRIE, [facility]    [facility]     04/20/25 1027   Service Assessment   Patient Orientation Alert and Oriented;Person;Place;Self;Situation   Cognition Alert   History Provided By Medical Record;Patient   Primary Caregiver Self   Accompanied By/Relationship none   Support Systems Children;Parent   Patient's Healthcare Decision Maker is: Patient Declined (Legal Next of Kin Remains as Decision Maker)   PCP Verified by CM Yes  (Mayra Moreno NP)   Last Visit to PCP Within last 3 months  (2/20/25)   Prior Functional Level Independent in ADLs/IADLs   Current Functional Level Independent in ADLs/IADLs   Can patient return to prior living arrangement Yes   Ability to make needs known: Good   Family able to assist with home care needs: Yes   Would you like for me to discuss the discharge plan with any other family members/significant others, and if so, who?

## 2025-04-21 NOTE — CARE COORDINATION
Discharge planning was discussed with the Critical Care Interdisciplinary Team. The patient is a potential discharge tomorrow.

## 2025-04-21 NOTE — PROGRESS NOTES
4 Eyes Skin Assessment     NAME:  Roslyn Malik  YOB: 1990  MEDICAL RECORD NUMBER:  [id number]    The patient is being assessed for  Admission    I agree that at least one RN has performed a thorough Head to Toe Skin Assessment on the patient. ALL assessment sites listed below have been assessed.      Areas assessed by both nurses:    Head, Face, Ears, Shoulders, Back, Chest, Arms, Elbows, Hands, Sacrum. Buttock, Coccyx, Ischium, and Legs. Feet and Heels        Does the Patient have a Wound? No noted wound(s)       Stepan Prevention initiated by RN: No  Wound Care Orders initiated by RN: No    Pressure Injury (Stage 3,4, Unstageable, DTI, NWPT, and Complex wounds) if present, place Wound referral order by RN under : No    New Ostomies, if present place, Ostomy referral order under : No     Nurse 1 eSignature: Electronically signed by Stefano Casiano RN on 4/19/25 at 11:41 PM EDT    **SHARE this note so that the co-signing nurse can place an eSignature**    Nurse 2 eSignature: Electronically signed by VERONIKA SWEENEY RN on 4/19/25 at 11:53 PM EDT   
Spiritual Health History and Assessment/Progress Note  [facility]      Room # 376/01    Name: Roslyn Malik           Age: 34 y.o.    Gender: female          MRN: [id number]  Amish: [employer]orationism       Preferred Language: English      Date: 04/21/25  Visit Time: Begin Time: 0900 End Time : 0910 Complexity:        Visit Summary: Participated in IDT Rounds at bedside with staff.  Patient doing well and hopes to be discharged home tomorrow.  No needs expressed at this time.  Chaplains remain available for continued support.    Referral/Consult From: Referral/Consult From: Rounding  Encounter Overview/Reason: Encounter Overview/Reason: Initial Encounter, Interdisciplinary rounds  Encounter Code:     Crisis (if applicable):    Service Provided For: Service Provided For: Patient     Patient was available.    Frannie, Belief, Meaning:   Patient identifies as spiritual  Family/Friends No family/friends present  Rituals      Importance and Influence:  Patient has spiritual/personal beliefs that influence decisions regarding their health  Family/Friends No family/friends present    Community:  Patient Support system includes parents/friends.    Family/Friends:Supportive      Assessment and Plan of Care:   Emotions Expressed by Patient:   Assessment: Calm    Interventions by :   Intervention: Prayer (assurance of)/New Hope     Result/ Response by Patient:        Patient Plan of Care:   Plan and Referrals  Plan/Referrals: No future visits requested     Emotions Expressed by Spouse/Family/Friends:   No family/ friends present when  visited.     Interventions with Spouse/ Family/Friends include:   provided ministry of presence    Spouse/Family/Friends Plan of Care:   No spiritual needs identified for follow-up. and Spiritual care available upon referral.      Electronically signed by Chaplain Connie, on 4/21/2025 at 10:07 AM.  Spiritual Health  [facility] [place]  [phone]  
TRANSFER - IN REPORT:    Verbal report received from Flori BRAVO on Roslyn Malik  being received from [facility] ED for routine progression of patient care      Report consisted of patient's Situation, Background, Assessment and   Recommendations(SBAR).     Information from the following report(s) ED Encounter Summary, ED SBAR, MAR, and Recent Results was reviewed with the receiving nurse.    Opportunity for questions and clarification was provided.      Assessment completed upon patient's arrival to unit and care assumed.    
Meds:  Current Facility-Administered Medications   Medication Dose Route Frequency    0.9 % sodium chloride infusion   IntraVENous Continuous    LORazepam (ATIVAN) 2 mg in sodium chloride (PF) 0.9 % 10 mL injection  2 mg IntraVENous Q6H PRN    sodium chloride flush 0.9 % injection 5-40 mL  5-40 mL IntraVENous 2 times per day    sodium chloride flush 0.9 % injection 5-40 mL  5-40 mL IntraVENous PRN    0.9 % sodium chloride infusion   IntraVENous PRN    potassium chloride (KLOR-CON M) extended release tablet 40 mEq  40 mEq Oral PRN    Or    potassium bicarb-citric acid (EFFER-K) effervescent tablet 40 mEq  40 mEq Oral PRN    Or    potassium chloride 10 mEq/100 mL IVPB (Peripheral Line)  10 mEq IntraVENous PRN    magnesium sulfate 2000 mg in 50 mL IVPB premix  2,000 mg IntraVENous PRN    ondansetron (ZOFRAN-ODT) disintegrating tablet 4 mg  4 mg Oral Q8H PRN    Or    ondansetron (ZOFRAN) injection 4 mg  4 mg IntraVENous Q6H PRN    polyethylene glycol (GLYCOLAX) packet 17 g  17 g Oral Daily PRN    aluminum & magnesium hydroxide-simethicone (MAALOX PLUS) 200-200-20 MG/5ML suspension 30 mL  30 mL Oral Q6H PRN    acetaminophen (TYLENOL) tablet 650 mg  650 mg Oral Q6H PRN    Or    acetaminophen (TYLENOL) suppository 650 mg  650 mg Rectal Q6H PRN    sodium chloride flush 0.9 % injection 5-40 mL  5-40 mL IntraVENous 2 times per day    sodium chloride flush 0.9 % injection 5-40 mL  5-40 mL IntraVENous PRN    0.9 % sodium chloride infusion   IntraVENous PRN    thiamine (B-1) injection 100 mg  100 mg IntraVENous Daily    folic acid (FOLVITE) tablet 1 mg  1 mg Oral Daily    LORazepam (ATIVAN) tablet 1 mg  1 mg Oral Q1H PRN    Or    LORazepam (ATIVAN) 1 mg in sodium chloride (PF) 0.9 % 10 mL injection  1 mg IntraVENous Q1H PRN    Or    LORazepam (ATIVAN) tablet 2 mg  2 mg Oral Q1H PRN    Or    LORazepam (ATIVAN) 2 mg in sodium chloride (PF) 0.9 % 10 mL injection  2 mg IntraVENous Q1H PRN    Or    LORazepam (ATIVAN) tablet 3 mg  3 
aluminum & magnesium hydroxide-simethicone (MAALOX PLUS) 200-200-20 MG/5ML suspension 30 mL  30 mL Oral Q6H PRN    acetaminophen (TYLENOL) tablet 650 mg  650 mg Oral Q6H PRN    Or    acetaminophen (TYLENOL) suppository 650 mg  650 mg Rectal Q6H PRN    sodium chloride flush 0.9 % injection 5-40 mL  5-40 mL IntraVENous 2 times per day    sodium chloride flush 0.9 % injection 5-40 mL  5-40 mL IntraVENous PRN    0.9 % sodium chloride infusion   IntraVENous PRN    thiamine (B-1) injection 100 mg  100 mg IntraVENous Daily    folic acid (FOLVITE) tablet 1 mg  1 mg Oral Daily    LORazepam (ATIVAN) tablet 1 mg  1 mg Oral Q1H PRN    Or    LORazepam (ATIVAN) 1 mg in sodium chloride (PF) 0.9 % 10 mL injection  1 mg IntraVENous Q1H PRN    Or    LORazepam (ATIVAN) tablet 2 mg  2 mg Oral Q1H PRN    Or    LORazepam (ATIVAN) 2 mg in sodium chloride (PF) 0.9 % 10 mL injection  2 mg IntraVENous Q1H PRN    Or    LORazepam (ATIVAN) tablet 3 mg  3 mg Oral Q1H PRN    Or    LORazepam (ATIVAN) 3 mg in sodium chloride (PF) 0.9 % 10 mL injection  3 mg IntraVENous Q1H PRN    Or    LORazepam (ATIVAN) tablet 4 mg  4 mg Oral Q1H PRN    Or    LORazepam (ATIVAN) 4 mg in sodium chloride (PF) 0.9 % 10 mL injection  4 mg IntraVENous Q1H PRN    escitalopram (LEXAPRO) tablet 20 mg  20 mg Oral Daily    famotidine (PEPCID) tablet 20 mg  20 mg Oral BID    levETIRAcetam (KEPPRA) tablet 500 mg  500 mg Oral Daily    hydroxychloroquine (PLAQUENIL) tablet 200 mg  200 mg Oral Daily    pantoprazole (PROTONIX) tablet 40 mg  40 mg Oral BID AC    magnesium oxide (MAG-OX) tablet 400 mg  400 mg Oral BID       Signed:  Harley Lawson MD    Part of this note may have been written by using a voice dictation software.  The note has been proof read but may still contain some grammatical/other typographical errors.

## 2025-04-21 NOTE — CONSENT
Informed Consent for Blood Component Transfusion Note    I have discussed with the patient the rationale for blood component transfusion; its benefits in treating or preventing fatigue, organ damage, or death; and its risk which includes mild transfusion reactions, rare risk of blood borne infection, or more serious but rare reactions. I have discussed the alternatives to transfusion, including the risk and consequences of not receiving transfusion. The patient had an opportunity to ask questions and had agreed to proceed with transfusion of blood components.    Electronically signed by Cipriano Augustin MD on 4/21/25 at 4:54 AM EDT

## 2025-04-21 NOTE — CARE COORDINATION
RN CM met with the patient at the bedside and discussed discharge planning. She is a potential discharge home tomorrow. Her family will transport her home from the hospital. She stated her mother is taking care of her children. She confirmed she has a history of alcohol use and is interested in cessation resources including a referral to [facility]. The [facility] liaison was contacted on her behalf. She was provided with community resources for [facility], [facility], and [facility].

## 2025-04-21 NOTE — PLAN OF CARE
Problem: Safety - Adult  Goal: Free from fall injury  4/21/2025 1014 by Tomasa Gomez, RN  Outcome: Progressing

## 2025-04-22 VITALS
TEMPERATURE: 98.3 F | OXYGEN SATURATION: 99 % | WEIGHT: 172.1 LBS | BODY MASS INDEX: 29.38 KG/M2 | HEART RATE: 64 BPM | RESPIRATION RATE: 19 BRPM | SYSTOLIC BLOOD PRESSURE: 104 MMHG | HEIGHT: 64 IN | DIASTOLIC BLOOD PRESSURE: 79 MMHG

## 2025-04-22 LAB
ABO + RH BLD: NORMAL
BASOPHILS # BLD: 0.02 K/UL (ref 0–0.2)
BASOPHILS NFR BLD: 0.3 % (ref 0–2)
BLD PROD TYP BPU: NORMAL
BLOOD BANK BLOOD PRODUCT EXPIRATION DATE: NORMAL
BLOOD BANK DISPENSE STATUS: NORMAL
BLOOD BANK ISBT PRODUCT BLOOD TYPE: 6200
BLOOD BANK PRODUCT CODE: NORMAL
BLOOD BANK UNIT TYPE AND RH: NORMAL
BLOOD GROUP ANTIBODIES SERPL: NORMAL
BPU ID: NORMAL
CROSSMATCH RESULT: NORMAL
DIFFERENTIAL METHOD BLD: ABNORMAL
EOSINOPHIL # BLD: 0.09 K/UL (ref 0–0.8)
EOSINOPHIL NFR BLD: 1.2 % (ref 0.5–7.8)
ERYTHROCYTE [DISTWIDTH] IN BLOOD BY AUTOMATED COUNT: 18.5 % (ref 11.9–14.6)
HCT VFR BLD AUTO: 24.9 % (ref 35.8–46.3)
HCT VFR BLD AUTO: 25.4 % (ref 35.8–46.3)
HGB BLD-MCNC: 7.7 G/DL (ref 11.7–15.4)
HGB BLD-MCNC: 8 G/DL (ref 11.7–15.4)
IMM GRANULOCYTES # BLD AUTO: 0.03 K/UL (ref 0–0.5)
IMM GRANULOCYTES NFR BLD AUTO: 0.4 % (ref 0–5)
LYMPHOCYTES # BLD: 1.92 K/UL (ref 0.5–4.6)
LYMPHOCYTES NFR BLD: 26.5 % (ref 13–44)
MAGNESIUM SERPL-MCNC: 1.7 MG/DL (ref 1.8–2.4)
MCH RBC QN AUTO: 28.1 PG (ref 26.1–32.9)
MCHC RBC AUTO-ENTMCNC: 31.5 G/DL (ref 31.4–35)
MCV RBC AUTO: 89.1 FL (ref 82–102)
MONOCYTES # BLD: 0.57 K/UL (ref 0.1–1.3)
MONOCYTES NFR BLD: 7.9 % (ref 4–12)
NEUTS SEG # BLD: 4.61 K/UL (ref 1.7–8.2)
NEUTS SEG NFR BLD: 63.7 % (ref 43–78)
NRBC # BLD: 0 K/UL (ref 0–0.2)
PLATELET # BLD AUTO: 184 K/UL (ref 150–450)
PMV BLD AUTO: 11.1 FL (ref 9.4–12.3)
RBC # BLD AUTO: 2.85 M/UL (ref 4.05–5.2)
SPECIMEN EXP DATE BLD: NORMAL
UNIT DIVISION: 0
UNIT ISSUE DATE/TIME: NORMAL
WBC # BLD AUTO: 7.2 K/UL (ref 4.3–11.1)

## 2025-04-22 PROCEDURE — 85025 COMPLETE CBC W/AUTO DIFF WBC: CPT

## 2025-04-22 PROCEDURE — 83735 ASSAY OF MAGNESIUM: CPT

## 2025-04-22 PROCEDURE — 36415 COLL VENOUS BLD VENIPUNCTURE: CPT

## 2025-04-22 PROCEDURE — 6360000002 HC RX W HCPCS: Performed by: INTERNAL MEDICINE

## 2025-04-22 PROCEDURE — 6370000000 HC RX 637 (ALT 250 FOR IP): Performed by: HOSPITALIST

## 2025-04-22 PROCEDURE — 6370000000 HC RX 637 (ALT 250 FOR IP): Performed by: INTERNAL MEDICINE

## 2025-04-22 PROCEDURE — 85014 HEMATOCRIT: CPT

## 2025-04-22 PROCEDURE — 2500000003 HC RX 250 WO HCPCS: Performed by: HOSPITALIST

## 2025-04-22 PROCEDURE — 2580000003 HC RX 258: Performed by: INTERNAL MEDICINE

## 2025-04-22 PROCEDURE — 85018 HEMOGLOBIN: CPT

## 2025-04-22 RX ORDER — LANOLIN ALCOHOL/MO/W.PET/CERES
100 CREAM (GRAM) TOPICAL DAILY
Qty: 30 TABLET | Refills: 3 | Status: SHIPPED | OUTPATIENT
Start: 2025-04-23

## 2025-04-22 RX ORDER — PANTOPRAZOLE SODIUM 40 MG/1
40 TABLET, DELAYED RELEASE ORAL
Qty: 60 TABLET | Refills: 0 | Status: SHIPPED | OUTPATIENT
Start: 2025-04-22 | End: 2025-05-22

## 2025-04-22 RX ORDER — CETIRIZINE HYDROCHLORIDE 10 MG/1
10 TABLET ORAL DAILY
Qty: 7 TABLET | Refills: 0 | Status: SHIPPED | OUTPATIENT
Start: 2025-04-23 | End: 2025-04-30

## 2025-04-22 RX ADMIN — Medication 400 MG: at 08:10

## 2025-04-22 RX ADMIN — FAMOTIDINE 20 MG: 20 TABLET, FILM COATED ORAL at 08:11

## 2025-04-22 RX ADMIN — PANTOPRAZOLE SODIUM 40 MG: 40 INJECTION, POWDER, FOR SOLUTION INTRAVENOUS at 08:11

## 2025-04-22 RX ADMIN — ESCITALOPRAM OXALATE 20 MG: 10 TABLET ORAL at 08:10

## 2025-04-22 RX ADMIN — FOLIC ACID 1 MG: 1 TABLET ORAL at 08:10

## 2025-04-22 RX ADMIN — SODIUM CHLORIDE, PRESERVATIVE FREE 10 ML: 5 INJECTION INTRAVENOUS at 08:11

## 2025-04-22 RX ADMIN — DICLOFENAC SODIUM 2 G: 10 GEL TOPICAL at 08:12

## 2025-04-22 RX ADMIN — CETIRIZINE HYDROCHLORIDE 10 MG: 10 TABLET, FILM COATED ORAL at 08:11

## 2025-04-22 RX ADMIN — Medication 100 MG: at 08:09

## 2025-04-22 RX ADMIN — LEVETIRACETAM 500 MG: 500 TABLET, FILM COATED ORAL at 08:11

## 2025-04-22 RX ADMIN — HYDROXYCHLOROQUINE SULFATE 200 MG: 200 TABLET ORAL at 08:10

## 2025-04-22 RX ADMIN — SODIUM CHLORIDE, PRESERVATIVE FREE 10 ML: 5 INJECTION INTRAVENOUS at 08:12

## 2025-04-22 ASSESSMENT — PAIN SCALES - GENERAL
PAINLEVEL_OUTOF10: 0
PAINLEVEL_OUTOF10: 0

## 2025-04-22 NOTE — DISCHARGE SUMMARY
Hospitalist Discharge Summary   Admit Date:  2025  2:14 PM   DC Note date: 2025  Name:  Roslyn Malik   Age:  34 y.o.  Sex:  female  :  1990   MRN:  [id number]   Room:  [facility]  PCP:  Randall Douglas Jr., MD    Presenting Complaint: Other     Initial Admission Diagnosis: Hypomagnesemia [E83.42]  SIRS (systemic inflammatory response syndrome) (HCC) [R65.10]  Septic shock (HCC) [A41.9, R65.21]  Nausea and vomiting, unspecified vomiting type [R11.2]     Problem List for this Hospitalization (present on admission):    Principal Problem:    SIRS (systemic inflammatory response syndrome) (HCC)  Active Problems:    Elevated LFTs    Hypomagnesemia    Gastric varices    Transaminitis    Alcohol dependence    High anion gap metabolic acidosis    Lactic acidosis  Resolved Problems:    * No resolved hospital problems. *      Hospital Course:  34-year-old female history of alcohol abuse, gastric varices presented due to left clavicle pain and swelling.  Patient noted to have electrolyte abnormalities and metabolic acidosis likely related to her alcohol use.  Patient was treated with IV fluids.  Patient presented SIRS with no outward signs of infection.  CT abdomen pelvis with fatty liver, hepatomegaly, prior cholecystectomy and chronic pancreatic atrophy with prominent gastric varices.  Blood cultures nothing grown to date.  Patient also with acute blood loss anemia.  Hemoglobin  of 6.1 with repeat hemoglobin 7.3.  Baseline hemoglobin appears to be between 7 and 8.  Likely hemodilution all which was noted patient's initial WBC of 18 sodium 147.  Prior provider had spoken with GI and given no overt signs of bleeding at this time no plans for interventions.  Patient denied any other further signs of bleeding including hematochezia, melena, hematuria.  Hemoglobin stable this morning.  Patient transaminitis likely secondary to alcohol dependence.  Patient was maintained on CIWA protocol during

## 2025-04-22 NOTE — CARE COORDINATION
Pt is for discharge home today with family and no needs/supportive care orders received for MSW at this time.    Please notify CM for any needs that may arise.     Rhianna Clark, MSW, [facility]    [facility]       04/22/25 [id number]   Service Assessment   Patient's Healthcare Decision Maker is: Patient Declined (Legal Next of Kin Remains as Decision Maker)   Social/Functional History   Lives With Daughter  (3 minor children)   Type of Home Apartment   Home Layout One level   Bathroom Toilet Standard   Bathroom Equipment Commode   Bathroom Accessibility Accessible   Home Equipment None   Receives Help From Family   Prior Level of Assist for ADLs Independent   Prior Level of Assist for Homemaking Independent   Homemaking Responsibilities Yes   Ambulation Assistance Independent   Prior Level of Assist for Transfers Independent   Active  Yes   Occupation Full time employment   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 No   Mode of Transport at Discharge Self   Confirm Follow Up Transport Family   Condition of Participation: Discharge Planning   The Plan for Transition of Care is related to the following treatment goals: Anticipate patient to return home when medically stable.   The Patient and/or Patient Representative was provided with a Choice of Provider? Patient   The Patient and/Or Patient Representative agree with the Discharge Plan? Yes   Freedom of Choice list was provided with basic dialogue that supports the patient's individualized plan of care/goals, treatment preferences, and shares the quality data associated with the providers?  Yes

## 2025-04-22 NOTE — PLAN OF CARE
Problem: Discharge Planning  Goal: Discharge to home or other facility with appropriate resources  Outcome: Progressing  Flowsheets (Taken 4/21/2025 1930)  Discharge to home or other facility with appropriate resources: Identify barriers to discharge with patient and caregiver     Problem: Pain  Goal: Verbalizes/displays adequate comfort level or baseline comfort level  Outcome: Progressing  Flowsheets (Taken 4/21/2025 1930)  Verbalizes/displays adequate comfort level or baseline comfort level:   Encourage patient to monitor pain and request assistance   Assess pain using appropriate pain scale   Administer analgesics based on type and severity of pain and evaluate response     Problem: Safety - Adult  Goal: Free from fall injury  4/21/2025 2039 by Gloria Fernando, RN  Outcome: Progressing  4/21/2025 1014 by Tomasa Gomez, RN  Outcome: Progressing     Problem: ABCDS Injury Assessment  Goal: Absence of physical injury  Outcome: Progressing

## 2025-04-22 NOTE — PLAN OF CARE
Problem: Discharge Planning  Goal: Discharge to home or other facility with appropriate resources  Outcome: Adequate for Discharge  Flowsheets (Taken 4/22/2025 0715)  Discharge to home or other facility with appropriate resources:   Identify barriers to discharge with patient and caregiver   Arrange for needed discharge resources and transportation as appropriate   Identify discharge learning needs (meds, wound care, etc)     Problem: Pain  Goal: Verbalizes/displays adequate comfort level or baseline comfort level  Outcome: Adequate for Discharge     Problem: Safety - Adult  Goal: Free from fall injury  Outcome: Adequate for Discharge  Flowsheets (Taken 4/22/2025 0746)  Free From Fall Injury:   Instruct family/caregiver on patient safety   Based on caregiver fall risk screen, instruct family/caregiver to ask for assistance with transferring infant if caregiver noted to have fall risk factors     Problem: ABCDS Injury Assessment  Goal: Absence of physical injury  Outcome: Adequate for Discharge  Flowsheets (Taken 4/22/2025 0746)  Absence of Physical Injury: Implement safety measures based on patient assessment     Problem: Skin/Tissue Integrity - Adult  Goal: Skin integrity remains intact  Outcome: Adequate for Discharge  Flowsheets (Taken 4/22/2025 0715)  Skin Integrity Remains Intact:   Monitor for areas of redness and/or skin breakdown   Assess vascular access sites hourly   Every 4-6 hours minimum:  Change oxygen saturation probe site     Problem: Gastrointestinal - Adult  Goal: Minimal or absence of nausea and vomiting  Outcome: Adequate for Discharge      28-Nov-2023 21:51

## 2025-04-24 LAB
BACTERIA SPEC CULT: NORMAL
BACTERIA SPEC CULT: NORMAL
SERVICE CMNT-IMP: NORMAL
SERVICE CMNT-IMP: NORMAL

## (undated) DEVICE — LAPAROSCOPIC TROCAR SLEEVE/SINGLE USE: Brand: KII® LOW PROFILE OPTICAL ACCESS SYSTEM

## (undated) DEVICE — TUBING INSUFFLATION SMK EVAC HI FLO SET PNEUMOCLEAR

## (undated) DEVICE — CONTAINER SPEC FRMLN 120ML --

## (undated) DEVICE — SUTURE VCRL SZ 0 L27IN ABSRB UD L26MM CT-2 1/2 CIR J270H

## (undated) DEVICE — BUTTON SWITCH PENCIL BLADE ELECTRODE, HOLSTER: Brand: EDGE

## (undated) DEVICE — MASTISOL ADHESIVE LIQ 2/3ML

## (undated) DEVICE — GENERAL LAPAROSCOPY: Brand: MEDLINE INDUSTRIES, INC.

## (undated) DEVICE — BAG SPEC REM 224ML W4XL6IN DIA10MM 1 HND GYN DISP ENDOPCH

## (undated) DEVICE — REM POLYHESIVE ADULT PATIENT RETURN ELECTRODE: Brand: VALLEYLAB

## (undated) DEVICE — 2, DISPOSABLE SUCTION/IRRIGATOR WITHOUT DISPOSABLE TIP: Brand: STRYKEFLOW

## (undated) DEVICE — BLADELESS OPTICAL TROCAR WITH FIXATION CANNULA: Brand: VERSAPORT

## (undated) DEVICE — SHEARS ENDOSCP L36CM DIA5MM ULTRASONIC CRV TIP W/ ADV

## (undated) DEVICE — SUTURE VCRL SZ 4-0 L18IN ABSRB UD L19MM PS-2 3/8 CIR PRIM J496H

## (undated) DEVICE — GARMENT,MEDLINE,DVT,INT,CALF,MED, GEN2: Brand: MEDLINE

## (undated) DEVICE — STRIP,CLOSURE,WOUND,MEDI-STRIP,1/2X4: Brand: MEDLINE

## (undated) DEVICE — 2000CC GUARDIAN II: Brand: GUARDIAN

## (undated) DEVICE — DRAPE TWL SURG 16X26IN BLU ORB04] ALLCARE INC]

## (undated) DEVICE — APPLIER CLP M/L SHFT DIA5MM 15 LIG LIGAMAX 5

## (undated) DEVICE — PAD,NON-ADHERENT,3X8,STERILE,LF,1/PK: Brand: MEDLINE

## (undated) DEVICE — LOGICUT SCISSOR LENGTH 320MM: Brand: LOGI - LAPAROSCOPIC INSTRUMENT SYSTEM

## (undated) DEVICE — (D)PREP SKN CHLRAPRP APPL 26ML -- CONVERT TO ITEM 371833

## (undated) DEVICE — KIT,ANTI FOG,W/SPONGE & FLUID,SOFT PACK: Brand: MEDLINE

## (undated) DEVICE — NEEDLE HYPO 21GA L1.5IN INTRAMUSCULAR S STL LATCH BVL UP